# Patient Record
Sex: FEMALE | Race: BLACK OR AFRICAN AMERICAN | NOT HISPANIC OR LATINO | Employment: UNEMPLOYED | URBAN - METROPOLITAN AREA
[De-identification: names, ages, dates, MRNs, and addresses within clinical notes are randomized per-mention and may not be internally consistent; named-entity substitution may affect disease eponyms.]

---

## 2018-02-19 ENCOUNTER — OFFICE VISIT CONVERTED (OUTPATIENT)
Dept: CARDIOLOGY | Facility: CLINIC | Age: 58
End: 2018-02-19
Attending: INTERNAL MEDICINE

## 2018-02-19 ENCOUNTER — CONVERSION ENCOUNTER (OUTPATIENT)
Dept: CARDIOLOGY | Facility: CLINIC | Age: 58
End: 2018-02-19

## 2018-05-21 ENCOUNTER — CONVERSION ENCOUNTER (OUTPATIENT)
Dept: GENERAL RADIOLOGY | Facility: HOSPITAL | Age: 58
End: 2018-05-21

## 2019-01-17 ENCOUNTER — OFFICE VISIT CONVERTED (OUTPATIENT)
Dept: ORTHOPEDIC SURGERY | Facility: CLINIC | Age: 59
End: 2019-01-17
Attending: ORTHOPAEDIC SURGERY

## 2019-01-24 ENCOUNTER — HOSPITAL ENCOUNTER (OUTPATIENT)
Dept: GENERAL RADIOLOGY | Facility: HOSPITAL | Age: 59
Discharge: HOME | End: 2019-01-24
Attending: ORTHOPAEDIC SURGERY

## 2019-02-07 ENCOUNTER — OFFICE VISIT CONVERTED (OUTPATIENT)
Dept: ORTHOPEDIC SURGERY | Facility: CLINIC | Age: 59
End: 2019-02-07
Attending: ORTHOPAEDIC SURGERY

## 2019-02-07 ENCOUNTER — HOSPITAL ENCOUNTER (OUTPATIENT)
Dept: LAB | Facility: HOSPITAL | Age: 59
Discharge: HOME | End: 2019-02-07

## 2019-02-07 LAB
ALBUMIN SERPL-MCNC: 4 G/DL (ref 3.5–5)
ALBUMIN/GLOB SERPL: 1.3 {RATIO} (ref 1.4–2.6)
ALP SERPL-CCNC: 104 U/L (ref 53–141)
ALT SERPL-CCNC: 21 U/L (ref 10–40)
ANION GAP SERPL CALC-SCNC: 19 MMOL/L (ref 8–19)
AST SERPL-CCNC: 17 U/L (ref 15–50)
BASOPHILS # BLD AUTO: 0.02 10*3/UL (ref 0–0.2)
BASOPHILS NFR BLD AUTO: 0.46 % (ref 0–3)
BILIRUB SERPL-MCNC: 0.22 MG/DL (ref 0.2–1.3)
BUN SERPL-MCNC: 13 MG/DL (ref 5–25)
BUN/CREAT SERPL: 14 {RATIO} (ref 6–20)
CALCIUM SERPL-MCNC: 9.8 MG/DL (ref 8.7–10.4)
CHLORIDE SERPL-SCNC: 102 MMOL/L (ref 99–111)
CONV CO2: 24 MMOL/L (ref 22–32)
CONV TOTAL PROTEIN: 7 G/DL (ref 6.3–8.2)
CREAT UR-MCNC: 0.9 MG/DL (ref 0.5–0.9)
EOSINOPHIL # BLD AUTO: 0.31 10*3/UL (ref 0–0.7)
EOSINOPHIL # BLD AUTO: 5.9 % (ref 0–7)
ERYTHROCYTE [DISTWIDTH] IN BLOOD BY AUTOMATED COUNT: 12 % (ref 11.5–14.5)
EST. AVERAGE GLUCOSE BLD GHB EST-MCNC: 134 MG/DL
GFR SERPLBLD BASED ON 1.73 SQ M-ARVRAT: >60 ML/MIN/{1.73_M2}
GLOBULIN UR ELPH-MCNC: 3 G/DL (ref 2–3.5)
GLUCOSE SERPL-MCNC: 116 MG/DL (ref 65–99)
HBA1C MFR BLD: 14.1 G/DL (ref 12–16)
HBA1C MFR BLD: 6.3 % (ref 3.5–5.7)
HCT VFR BLD AUTO: 41 % (ref 37–47)
LYMPHOCYTES # BLD AUTO: 1.57 10*3/UL (ref 1–5)
MCH RBC QN AUTO: 31.7 PG (ref 27–31)
MCHC RBC AUTO-ENTMCNC: 34.4 G/DL (ref 33–37)
MCV RBC AUTO: 91.9 FL (ref 81–99)
MONOCYTES # BLD AUTO: 0.59 10*3/UL (ref 0.2–1.2)
MONOCYTES NFR BLD AUTO: 11.4 % (ref 3–10)
NEUTROPHILS # BLD AUTO: 2.69 10*3/UL (ref 2–8)
NEUTROPHILS NFR BLD AUTO: 52 % (ref 30–85)
NRBC BLD AUTO-RTO: 0 % (ref 0–0.01)
OSMOLALITY SERPL CALC.SUM OF ELEC: 291 MOSM/KG (ref 273–304)
PLATELET # BLD AUTO: 264 10*3/UL (ref 130–400)
PMV BLD AUTO: 8.1 FL (ref 7.4–10.4)
POTASSIUM SERPL-SCNC: 4.8 MMOL/L (ref 3.5–5.3)
RBC # BLD AUTO: 4.46 10*6/UL (ref 4.2–5.4)
SODIUM SERPL-SCNC: 140 MMOL/L (ref 135–147)
TSH SERPL-ACNC: 1.66 M[IU]/L (ref 0.27–4.2)
VARIANT LYMPHS NFR BLD MANUAL: 30.3 % (ref 20–45)
WBC # BLD AUTO: 5.17 10*3/UL (ref 4.8–10.8)

## 2019-02-08 LAB
ASO AB SERPL-ACNC: 50 [IU]/ML (ref 0–200)
CONV ANTI NUCLEAR ANTIBODY WITH REFLEX: NEGATIVE
CONV RHEUMATOID FACTOR IGM: <10 [IU]/ML (ref 0–14)
CRP SERPL-MCNC: 3.6 MG/L (ref 0–5)
URATE SERPL-MCNC: 5.3 MG/DL (ref 2.5–7.5)

## 2019-03-20 ENCOUNTER — OFFICE VISIT CONVERTED (OUTPATIENT)
Dept: OTOLARYNGOLOGY | Facility: CLINIC | Age: 59
End: 2019-03-20
Attending: OTOLARYNGOLOGY

## 2019-04-04 ENCOUNTER — CONVERSION ENCOUNTER (OUTPATIENT)
Dept: PULMONOLOGY | Facility: CLINIC | Age: 59
End: 2019-04-04

## 2019-09-19 ENCOUNTER — HOSPITAL ENCOUNTER (OUTPATIENT)
Dept: LAB | Facility: HOSPITAL | Age: 59
Discharge: HOME | End: 2019-09-19
Attending: PHYSICIAN ASSISTANT

## 2019-09-19 ENCOUNTER — OFFICE VISIT CONVERTED (OUTPATIENT)
Dept: PULMONOLOGY | Facility: CLINIC | Age: 59
End: 2019-09-19
Attending: PHYSICIAN ASSISTANT

## 2019-09-21 LAB — BACTERIA SPEC AEROBE CULT: NORMAL

## 2019-10-24 ENCOUNTER — HOSPITAL ENCOUNTER (OUTPATIENT)
Dept: CARDIOLOGY | Facility: HOSPITAL | Age: 59
Discharge: HOME | End: 2019-10-24
Attending: PHYSICIAN ASSISTANT

## 2019-10-28 ENCOUNTER — OFFICE VISIT CONVERTED (OUTPATIENT)
Dept: PULMONOLOGY | Facility: CLINIC | Age: 59
End: 2019-10-28
Attending: PHYSICIAN ASSISTANT

## 2019-12-04 ENCOUNTER — HOSPITAL ENCOUNTER (OUTPATIENT)
Dept: CARDIOLOGY | Facility: HOSPITAL | Age: 59
Discharge: HOME | End: 2019-12-04
Attending: PHYSICIAN ASSISTANT

## 2020-01-31 ENCOUNTER — HOSPITAL ENCOUNTER (OUTPATIENT)
Dept: GENERAL RADIOLOGY | Facility: HOSPITAL | Age: 60
Discharge: HOME | End: 2020-01-31
Attending: PHYSICIAN ASSISTANT

## 2020-02-13 ENCOUNTER — OFFICE VISIT CONVERTED (OUTPATIENT)
Dept: PULMONOLOGY | Facility: CLINIC | Age: 60
End: 2020-02-13
Attending: PHYSICIAN ASSISTANT

## 2020-03-11 ENCOUNTER — OFFICE VISIT CONVERTED (OUTPATIENT)
Dept: PULMONOLOGY | Facility: CLINIC | Age: 60
End: 2020-03-11
Attending: PHYSICIAN ASSISTANT

## 2020-03-23 ENCOUNTER — OFFICE VISIT CONVERTED (OUTPATIENT)
Dept: OTHER | Facility: HOSPITAL | Age: 60
End: 2020-03-23
Attending: PHYSICIAN ASSISTANT

## 2020-03-27 ENCOUNTER — TELEMEDICINE CONVERTED (OUTPATIENT)
Dept: OTOLARYNGOLOGY | Facility: CLINIC | Age: 60
End: 2020-03-27
Attending: OTOLARYNGOLOGY

## 2020-04-01 ENCOUNTER — OFFICE VISIT CONVERTED (OUTPATIENT)
Dept: PULMONOLOGY | Facility: CLINIC | Age: 60
End: 2020-04-01
Attending: PHYSICIAN ASSISTANT

## 2020-04-29 ENCOUNTER — TELEPHONE CONVERTED (OUTPATIENT)
Dept: OTOLARYNGOLOGY | Facility: CLINIC | Age: 60
End: 2020-04-29
Attending: OTOLARYNGOLOGY

## 2020-05-14 ENCOUNTER — OFFICE VISIT CONVERTED (OUTPATIENT)
Dept: PULMONOLOGY | Facility: CLINIC | Age: 60
End: 2020-05-14
Attending: PHYSICIAN ASSISTANT

## 2020-05-18 ENCOUNTER — CONVERSION ENCOUNTER (OUTPATIENT)
Dept: OTHER | Facility: HOSPITAL | Age: 60
End: 2020-05-18

## 2020-05-18 ENCOUNTER — TELEPHONE CONVERTED (OUTPATIENT)
Dept: OTOLARYNGOLOGY | Facility: CLINIC | Age: 60
End: 2020-05-18
Attending: OTOLARYNGOLOGY

## 2020-05-18 ENCOUNTER — HOSPITAL ENCOUNTER (OUTPATIENT)
Dept: OTHER | Facility: HOSPITAL | Age: 60
Discharge: HOME | End: 2020-05-18
Attending: OTOLARYNGOLOGY

## 2020-05-20 ENCOUNTER — TELEMEDICINE CONVERTED (OUTPATIENT)
Dept: FAMILY MEDICINE CLINIC | Facility: CLINIC | Age: 60
End: 2020-05-20
Attending: NURSE PRACTITIONER

## 2020-05-20 LAB — SARS-COV-2 RNA SPEC QL NAA+PROBE: NOT DETECTED

## 2020-05-21 ENCOUNTER — HOSPITAL ENCOUNTER (OUTPATIENT)
Dept: LAB | Facility: HOSPITAL | Age: 60
Discharge: HOME | End: 2020-05-21
Attending: NURSE PRACTITIONER

## 2020-05-21 LAB
25(OH)D3 SERPL-MCNC: 23.1 NG/ML (ref 30–100)
ALBUMIN SERPL-MCNC: 4.2 G/DL (ref 3.5–5)
ALBUMIN/GLOB SERPL: 1.4 {RATIO} (ref 1.4–2.6)
ALP SERPL-CCNC: 108 U/L (ref 53–141)
ALT SERPL-CCNC: 21 U/L (ref 10–40)
ANION GAP SERPL CALC-SCNC: 17 MMOL/L (ref 8–19)
AST SERPL-CCNC: 18 U/L (ref 15–50)
BASOPHILS # BLD AUTO: 0.03 10*3/UL (ref 0–0.2)
BASOPHILS NFR BLD AUTO: 0.5 % (ref 0–3)
BILIRUB SERPL-MCNC: 0.21 MG/DL (ref 0.2–1.3)
BUN SERPL-MCNC: 15 MG/DL (ref 5–25)
BUN/CREAT SERPL: 18 {RATIO} (ref 6–20)
CALCIUM SERPL-MCNC: 9.6 MG/DL (ref 8.7–10.4)
CHLORIDE SERPL-SCNC: 104 MMOL/L (ref 99–111)
CHOLEST SERPL-MCNC: 259 MG/DL (ref 107–200)
CHOLEST/HDLC SERPL: 4.6 {RATIO} (ref 3–6)
CONV ABS IMM GRAN: 0.02 10*3/UL (ref 0–0.2)
CONV CO2: 24 MMOL/L (ref 22–32)
CONV IMMATURE GRAN: 0.4 % (ref 0–1.8)
CONV RHEUMATOID FACTOR IGM: <10 [IU]/ML (ref 0–14)
CONV TOTAL PROTEIN: 7.3 G/DL (ref 6.3–8.2)
CREAT UR-MCNC: 0.83 MG/DL (ref 0.5–0.9)
CRP SERPL-MCNC: 3.6 MG/L (ref 0–5)
DEPRECATED RDW RBC AUTO: 45.9 FL (ref 36.4–46.3)
EOSINOPHIL # BLD AUTO: 0.22 10*3/UL (ref 0–0.7)
EOSINOPHIL # BLD AUTO: 3.9 % (ref 0–7)
ERYTHROCYTE [DISTWIDTH] IN BLOOD BY AUTOMATED COUNT: 13.1 % (ref 11.7–14.4)
ERYTHROCYTE [SEDIMENTATION RATE] IN BLOOD: 12 MM/H (ref 0–30)
EST. AVERAGE GLUCOSE BLD GHB EST-MCNC: 140 MG/DL
GFR SERPLBLD BASED ON 1.73 SQ M-ARVRAT: >60 ML/MIN/{1.73_M2}
GLOBULIN UR ELPH-MCNC: 3.1 G/DL (ref 2–3.5)
GLUCOSE SERPL-MCNC: 113 MG/DL (ref 65–99)
HBA1C MFR BLD: 6.5 % (ref 3.5–5.7)
HCT VFR BLD AUTO: 46.6 % (ref 37–47)
HDLC SERPL-MCNC: 56 MG/DL (ref 40–60)
HGB BLD-MCNC: 14.5 G/DL (ref 12–16)
LDLC SERPL CALC-MCNC: 170 MG/DL (ref 70–100)
LYMPHOCYTES # BLD AUTO: 1.95 10*3/UL (ref 1–5)
LYMPHOCYTES NFR BLD AUTO: 34.6 % (ref 20–45)
MCH RBC QN AUTO: 29.7 PG (ref 27–31)
MCHC RBC AUTO-ENTMCNC: 31.1 G/DL (ref 33–37)
MCV RBC AUTO: 95.3 FL (ref 81–99)
MONOCYTES # BLD AUTO: 0.63 10*3/UL (ref 0.2–1.2)
MONOCYTES NFR BLD AUTO: 11.2 % (ref 3–10)
NEUTROPHILS # BLD AUTO: 2.78 10*3/UL (ref 2–8)
NEUTROPHILS NFR BLD AUTO: 49.4 % (ref 30–85)
NRBC CBCN: 0 % (ref 0–0.7)
OSMOLALITY SERPL CALC.SUM OF ELEC: 294 MOSM/KG (ref 273–304)
PLATELET # BLD AUTO: 286 10*3/UL (ref 130–400)
PMV BLD AUTO: 11.2 FL (ref 9.4–12.3)
POTASSIUM SERPL-SCNC: 4.2 MMOL/L (ref 3.5–5.3)
RBC # BLD AUTO: 4.89 10*6/UL (ref 4.2–5.4)
SODIUM SERPL-SCNC: 141 MMOL/L (ref 135–147)
TRIGL SERPL-MCNC: 163 MG/DL (ref 40–150)
TSH SERPL-ACNC: 1.87 M[IU]/L (ref 0.27–4.2)
URATE SERPL-MCNC: 4.5 MG/DL (ref 2.5–7.5)
VIT B12 SERPL-MCNC: 1038 PG/ML (ref 211–911)
VLDLC SERPL-MCNC: 33 MG/DL (ref 5–37)
WBC # BLD AUTO: 5.63 10*3/UL (ref 4.8–10.8)

## 2020-05-23 LAB
CCP IGA+IGG SERPL IA-ACNC: 9 UNITS (ref 0–19)
DSDNA AB SER-ACNC: NEGATIVE [IU]/ML
ENA AB SER IA-ACNC: NEGATIVE {RATIO}

## 2020-05-29 ENCOUNTER — TELEMEDICINE CONVERTED (OUTPATIENT)
Dept: FAMILY MEDICINE CLINIC | Facility: CLINIC | Age: 60
End: 2020-05-29
Attending: NURSE PRACTITIONER

## 2020-07-31 ENCOUNTER — TELEMEDICINE CONVERTED (OUTPATIENT)
Dept: FAMILY MEDICINE CLINIC | Facility: CLINIC | Age: 60
End: 2020-07-31
Attending: NURSE PRACTITIONER

## 2020-09-04 ENCOUNTER — OFFICE VISIT CONVERTED (OUTPATIENT)
Dept: SURGERY | Facility: CLINIC | Age: 60
End: 2020-09-04
Attending: NURSE PRACTITIONER

## 2020-10-15 ENCOUNTER — OFFICE VISIT CONVERTED (OUTPATIENT)
Dept: FAMILY MEDICINE CLINIC | Facility: CLINIC | Age: 60
End: 2020-10-15
Attending: NURSE PRACTITIONER

## 2020-10-26 ENCOUNTER — TELEMEDICINE CONVERTED (OUTPATIENT)
Dept: FAMILY MEDICINE CLINIC | Facility: CLINIC | Age: 60
End: 2020-10-26
Attending: NURSE PRACTITIONER

## 2020-11-16 ENCOUNTER — TELEMEDICINE CONVERTED (OUTPATIENT)
Dept: FAMILY MEDICINE CLINIC | Facility: CLINIC | Age: 60
End: 2020-11-16
Attending: NURSE PRACTITIONER

## 2021-01-02 ENCOUNTER — HOSPITAL ENCOUNTER (OUTPATIENT)
Dept: URGENT CARE | Facility: CLINIC | Age: 61
Discharge: HOME | End: 2021-01-02
Attending: NURSE PRACTITIONER

## 2021-01-28 ENCOUNTER — HOSPITAL ENCOUNTER (OUTPATIENT)
Dept: CARDIOLOGY | Facility: HOSPITAL | Age: 61
Discharge: HOME | End: 2021-01-28
Attending: NURSE PRACTITIONER

## 2021-01-28 ENCOUNTER — TELEMEDICINE CONVERTED (OUTPATIENT)
Dept: FAMILY MEDICINE CLINIC | Facility: CLINIC | Age: 61
End: 2021-01-28
Attending: NURSE PRACTITIONER

## 2021-02-05 ENCOUNTER — HOSPITAL ENCOUNTER (OUTPATIENT)
Dept: GENERAL RADIOLOGY | Facility: HOSPITAL | Age: 61
Discharge: HOME | End: 2021-02-05
Attending: INTERNAL MEDICINE

## 2021-03-24 ENCOUNTER — HOSPITAL ENCOUNTER (OUTPATIENT)
Dept: VACCINE CLINIC | Facility: HOSPITAL | Age: 61
Discharge: HOME | End: 2021-03-24
Attending: INTERNAL MEDICINE

## 2021-04-14 ENCOUNTER — OFFICE VISIT CONVERTED (OUTPATIENT)
Dept: FAMILY MEDICINE CLINIC | Facility: CLINIC | Age: 61
End: 2021-04-14
Attending: PHYSICIAN ASSISTANT

## 2021-04-14 ENCOUNTER — CONVERSION ENCOUNTER (OUTPATIENT)
Dept: FAMILY MEDICINE CLINIC | Facility: CLINIC | Age: 61
End: 2021-04-14

## 2021-04-14 ENCOUNTER — HOSPITAL ENCOUNTER (OUTPATIENT)
Dept: LAB | Facility: HOSPITAL | Age: 61
Discharge: HOME | End: 2021-04-14
Attending: PHYSICIAN ASSISTANT

## 2021-04-14 LAB — ERYTHROCYTE [SEDIMENTATION RATE] IN BLOOD: 28 MM/H (ref 0–30)

## 2021-04-15 ENCOUNTER — HOSPITAL ENCOUNTER (OUTPATIENT)
Dept: FAMILY MEDICINE CLINIC | Facility: CLINIC | Age: 61
Discharge: HOME | End: 2021-04-15
Attending: PHYSICIAN ASSISTANT

## 2021-04-15 LAB — SARS-COV-2 RNA SPEC QL NAA+PROBE: NOT DETECTED

## 2021-04-16 LAB
ASO AB SERPL-ACNC: 57 [IU]/ML (ref 0–200)
CONV RHEUMATOID FACTOR IGM: <10 [IU]/ML (ref 0–14)
CRP SERPL-MCNC: 7.8 MG/L (ref 0–5)
DSDNA AB SER-ACNC: NEGATIVE [IU]/ML
ENA AB SER IA-ACNC: ABNORMAL {RATIO}
URATE SERPL-MCNC: 5.1 MG/DL (ref 2.5–7.5)

## 2021-04-17 LAB
CENTROMERE AB TITR SER IF: 0.5 [IU]/ML (ref 0–7)
ENA SCL70 AB SER QL IA: <0.6 [IU]/ML (ref 0–7)
JO-1 (WB)*: <0.3 [IU]/ML (ref 0–7)
RNP: 3.3 [IU]/ML (ref 0–5)
RNP: <0.3 [IU]/ML (ref 0–7)
SMI: 1.5 [IU]/ML (ref 0–7)
SSA (RO) (ENA) AB, IGG: 0.5 [IU]/ML (ref 0–7)
SSB (LA) ENA AB, IGG: <0.3 [IU]/ML (ref 0–7)

## 2021-05-05 ENCOUNTER — OFFICE VISIT CONVERTED (OUTPATIENT)
Dept: FAMILY MEDICINE CLINIC | Facility: CLINIC | Age: 61
End: 2021-05-05
Attending: NURSE PRACTITIONER

## 2021-05-05 ENCOUNTER — HOSPITAL ENCOUNTER (OUTPATIENT)
Dept: GENERAL RADIOLOGY | Facility: HOSPITAL | Age: 61
Discharge: HOME | End: 2021-05-05
Attending: NURSE PRACTITIONER

## 2021-05-10 NOTE — H&P
History and Physical      Patient Name: Autumn Muñoz   Patient ID: [id number]   Sex: Female   YOB: 1960    Primary Care Provider: Jane MORGAN   Referring Provider: Jane MORGAN    Visit Date: September 4, 2020    Provider: EDDIE Dumont   Location: [facility]   Location Address: [address] [place] [address]  [id number]   Location Phone: [phone]          Chief Complaint  · Age 50 or over  · FH of colon cancer  · Constipation  · Abdominal Pain  · Difficulty Swallowing  · Requesting EGD and Colonoscopy      History Of Present Illness  The patient is a 60 year old /Black female presenting to the Surgical Specialist office on a referral from Jane MORGAN.   Autumn Muñoz needs to have a diagnostic colonoscopy and EGD.   Patient states that they have had a colonoscopy. 10 years ago   Patient currently complains of: constipation, difficulty swallowing, abdominal pain, and narrow stools   Patient Does have family history of colon cancer. grandmother      Patient presents today on referral from Mirtha Cordero for dysphasia, constipation, abdominal pain and change in bowel habit.  Patient reports that her stools have been more narrow for the last 3 months.  Patient reports that she has had trouble swallowing for the last 6 months and feeling as food gets stuck in her chest.  Denies any rectal bleeding.  Admits to family history of colon cancer with her maternal grandmother.    Reports last colonoscopy was over 10 years ago and was normal.    Patient does report that she had some problems with anesthesia in the past after having uterine fibroids removed.  I am assuming that this was probably general anesthesia.       Past Medical History  Disease Name Date Onset Notes   Allergic rhinitis --  --    Allergic rhinitis, chronic --  --    Arthritis --  --    Asthma --  --    Chronic Obstructive Pulmonary Disease --  --    GERD --  --    Headache  --  --    High cholesterol --  --    Hyperlipemia --  --    Limb Pain --  --    Limb Swelling --  --    Myofascial muscle pain --  --    Recurrent sinus infections --  --    Jaden's edema of vocal folds --  --    Shortness Of Air --  --    Thyroid nodule --  --    Tinnitus, bilateral --  --    Tobacco abuse --  --    Voice hoarseness --  --          Past Surgical History  Procedure Name Date Notes   *Denies any surgical procedures --  --    Ceasarian section --  --    Uterine fibroid embolization --  --          Medication List  Name Date Started Instructions   aspirin 325 mg oral tablet  take 1 tablet (325 mg) by oral route once daily   azelastine 137 mcg (0.1 %) nasal aerosol,spray 03/20/2019 spray 2 sprays in each nostril by intranasal route 2 times per day for 30 days   cetirizine 10 mg oral tablet 05/29/2020 take 1 tablet (10 mg) by oral route once daily for 30 days   cyclobenzaprine 10 mg oral tablet 03/20/2019 take 1 tablet by oral route once a day (at bedtime) for 30 days   Cymbalta 30 mg oral capsule,delayed release(DR/EC) 05/29/2020 take 1 capsule (30 mg) by oral route once daily   diclofenac sodium 50 mg oral tablet,delayed release (DR/EC) 03/27/2020 take 1 tablet (50 mg) by oral route 2 times per day for 30 days   Flonase Allergy Relief 50 mcg/actuation nasal spray,suspension 05/29/2020 spray 1 spray (50 mcg) in each nostril by intranasal route once daily   Lipitor 20 mg oral tablet 05/21/2020 take 1 tablet (20 mg) by oral route once daily at bedtime   montelukast 10 mg oral tablet 05/29/2020 take 1 tablet (10 mg) by oral route once daily in the evening   Nicorette 4 mg buccal gum 05/20/2020 chew 1 piece of gum (4 mg) by oral route every 2 hours as needed and as directed         Allergy List  Allergen Name Date Reaction Notes   naproxen --  --  --    NSAIDS --  --  --        Allergies Reconciled  Family Medical History  Disease Name Relative/Age Notes   Diabetes, unspecified type  Paternal grandparent  "  Family history of colon cancer Grandmother (maternal)/30s   Grandmother (maternal)/30s   Family history of breast cancer Sister/20s   Sister/20s; Aunt; Uncle/70s   Family history of certain chronic disabling diseases; arthritis Father/   Father         Social History  Finding Status Start/Stop Quantity Notes   Alcohol Use --  --/-- --  07/31/2020 - 05/29/2020 - 05/20/2020 - rarely drinks, less than 1 drink per day   Claustophobic Unknown --/-- --  yes   Recreational Drug Use Never --/-- --  no   Single. --  --/-- --  --    Tobacco Current every day --/-- 1/2 pk/day Currently taking Chantix trying to quit   Unemployed. --  --/-- --  --          Review of Systems  · Constitutional  o Denies  o : fever, chills  · Eyes  o Denies  o : yellowish discoloration of eyes  · HENT  o Denies  o : difficulty swallowing  · Cardiovascular  o Denies  o : chest pain, chest pain on exertion  · Respiratory  o Denies  o : shortness of breath  · Gastrointestinal  o Admits  o : constipation, dysphagia, abdominal pain, narrow stools  o Denies  o : nausea, vomiting, diarrhea  · Genitourinary  o Denies  o : abnormal color of urine  · Integument  o Denies  o : rash  · Neurologic  o Denies  o : tingling or numbness  · Musculoskeletal  o Denies  o : joint pain  · Endocrine  o Denies  o : weight gain, weight loss      Vitals  Date Time BP Position Site L\R Cuff Size HR RR TEMP (F) WT  HT  BMI kg/m2 BSA m2 O2 Sat        09/04/2020 11:32 AM         212lbs 0oz 5'  2\" 38.77 2.05           Physical Examination  · Constitutional  o Appearance  o : well developed, well-nourished, patient in no apparent distress  · Head and Face  o Head  o :   § Inspection  § : atraumatic, normocephalic  o Face  o :   § Inspection  § : no facial lesions  · Eyes  o Conjunctivae  o : conjunctivae normal  o Sclerae  o : sclerae white  · Neck  o Inspection/Palpation  o : normal appearance, no masses or tenderness, trachea midline  · Respiratory  o Respiratory " Effort  o : breathing unlabored  · Skin and Subcutaneous Tissue  o General Inspection  o : no lesions present, no areas of discoloration, skin turgor normal, texture normal  · Neurologic  o Mental Status Examination  o :   § Orientation  § : grossly oriented to person, place and time  § Attention  § : attention normal, concentration abilities normal  § Fund of Knowledge  § : fund of knowledge within normal limits, patient aware of current events  o Gait and Station  o : normal gait, able to stand without difficulty  · Psychiatric  o Judgement and Insight  o : judgment and insight intact  o Mood and Affect  o : mood normal, affect appropriate              Assessment  · Difficulty in swallowing     787.20/R13.10  · Family History of Colon Cancer     V16.0/Z80.0  · Abdominal pain, lower     789.09/R10.30  · Change in bowel habit     787.99/R19.4  · Pre-op testing     V72.84/Z01.818      Plan  · Orders  o Consent for Colonoscopy with Possible Biopsy - Possible risks/complications, benefits, and alternatives to surgical or invasive procedure have been explained to patient and/or legal guardian. -Patient has been evaluated and can tolerate anesthesia and/or sedation. Risks, benefits, and alternatives to anesthesia and sedation have been explained to patient and/or legal guardian. (40027) - 789.09/R10.30, V16.0/Z80.0, 787.99/R19.4 - 11/23/2020  o Consent for Esophagogastroduodenoscopy (EGD) with dilation - Possible risks/complications, benefits, and alternatives to surgical or invasive procedure have been explained to patient and/or legal guardian. - Patient has been evaluated and can tolerate anesthesia and/or sedation. Risks, benefits, and alternatives to anesthesia and sedation have been explained to patient and/or legal guardian. (41377) - 787.20/R13.10 - 11/23/2020  o [facility] [place]id-[place] Screening (28737) - V72.84/Z01.818 - 11/18/2020   [address] @ 8:00am  · Medications  o Medications have been  Reconciled  o Transition of Care or Provider Policy  · Instructions  o Surgical Facility: [facility]  o Handouts Provided Pre-Procedure Instructions including date, time, and location of procedure.   o PLAN: Proceeed with colonoscopy. Patient understands risks/benefits and is willing to proceed.   o PLAN: Proceeed with EGD. Patient understands risks/benefits and is willing to proceed.   o ***Surgical Orders***  o RISK AND BENEFITS:  o Given these options, the patient has verbally expressed an understanding of the risks of the surgery and finds these risks acceptable. Will proceed with surgery as soon as possible.  o O.R. PREP: Per protocol   o IV: Per Anesthesia  o Please sign permit for: Colonoscopy with possible biopsies by Dr. Singh.  o Please sign permit for: Esophagogastroduodenoscopy with possible biopsies and dilation by Dr. Singh.  o The above History and Physical Examination has been completed within 30 days of admission.  o ***Patient Status***  o Outpatient  o Follow up in the in the office post procedure.  o Advised patient she would need COVID-19 testing prior to procedure. Encourage patient self isolate in between testing and procedure. Patient verbalizes understanding is willing to proceed  o Electronically Identified Patient Education Materials Provided Electronically  · Disposition  o Call or Return if symptoms worsen or persist.            Electronically Signed by: EDDIE Dumont -Author on September 4, 2020 12:41:50 PM

## 2021-05-12 NOTE — PROGRESS NOTES
Quick Note      Patient Name: Autumn Muñoz   Patient ID: [id number]   Sex: Female   YOB: 1960    Primary Care Provider: Gregory Kohler MD   Referring Provider: Gillian NEGRO    Visit Date: April 29, 2020    Provider: Mark Juares MD   Location: [facility]   Location Address: [address] [place] [address], [address]  [id number]   Location Phone: [phone]          History Of Present Illness  TELEHEALTH TELEPHONE VISIT  Chief Complaint: f/u chronic rhinosinusitis   Autumn Muñoz is a 60 year old /Black female who is presenting for evaluation via telehealth telephone visit. Verbal consent obtained before beginning visit by ARIC Baker and JAMIE Ferreira. We last spoke on 3/27/20 at which time she was taking levofloxacin for a potential sinus infection. She was placed on azithromycin on 4/13/20. She tells me that her headaches and rhinorrhea have improved. She continues to use the azelastine, Flonase, cetirizine, and montelukast. She mentions that her right knee has been stiff and sore for around 1 week. She cannot recall any inciting injury or trauma. She denies that the knee itself is swollen and has not had any swelling or pain in the corresponding foot or calf. She does not have any history of blood clots. She has been taking some ibuprofen and putting heat on the joint without much improvement. She is also been elevating it at night and trying to stay off of it. She also mentions that her tinnitus is gotten much worse and is curious about her previously scheduled audiogram. She is asked if I would be willing to refill her allergy medications.   Provider spent 18 minutes with the patient during telehealth visit.   The following staff were present during this visit: Mark Juares MD   Past Medical History/Overview of Patient Symptoms          Assessment  · Chronic rhinosinusitis     473.9/J32.9  · Tobacco abuse     305.1/Z72.0  · Allergic  rhinitis     477.9/J30.9  · Myofascial muscle pain     729.1/M79.18  · Knee pain, right     719.46/M25.561  · Tinnitus of both ears     388.30/H93.13      Plan  · Orders  o Physician Telephone Evaluation, 11-20 minutes (87446) - 473.9/J32.9, 477.9/J30.9, 729.1/M79.18, 719.46/M25.561 - 04/29/2020  o Audiometry, comprehensive, threshold evaluation and speech recognition [facility] (28212) - 388.30/H93.13 - 04/29/2020  o Tympanogram (Impedance Testing) [facility] ([id number]) - 388.30/H93.13 - 04/29/2020  · Medications  o montelukast 10 mg oral tablet   SIG: take 1 tablet (10 mg) by oral route once daily in the evening   DISP: (30) tablets with 11 refills  Prescribed on 04/29/2020     o Flonase Allergy Relief 50 mcg/actuation nasal spray,suspension   SIG: spray 1 spray (50 mcg) in each nostril by intranasal route once daily   DISP: (1) 9.9 ml aer w/adap with 11 refills  Courtesy Refilled on 04/29/2020     · Instructions  o Plan Of Care: Impressions and findings were discussed Mrs. Muñoz at great length. Fortunately, her green to white rhinorrhea and headaches have resolved after the most recent course of azithromycin. Unfortunately, now her right knee is painful and stiff but without any swelling. We discussed treating this like more of an injury with ice, heat, elevation, and NSAIDs. She will call if she is not doing any better over the next few days or if there is any swelling that develops so I may order an ultrasound to rule out DVT although we discussed this is less likely and she is quite concerned about this possibility. We will also reschedule her for an audiogram and I will allergy medications.            Electronically Signed by: Mark Juares MD -Author on April 29, 2020 09:42:58 AM

## 2021-05-13 NOTE — PROGRESS NOTES
Progress Note      Patient Name: Autumn Muñoz   Patient ID: [id number]   Sex: Female   YOB: 1960    Primary Care Provider: Jane MORGAN   Referring Provider: SUJATHA MORGAN    Visit Date: October 26, 2020    Provider: EDDIE Livingston   Location: [facility]   Location Address: [address] [place] [address], [address]  [place], [place]  [id number]   Location Phone: [phone]          Chief Complaint  · congestion  · sore throat  · dizziness  · L ear pain      History Of Present Illness  Video Conferencing Visit  Autumn Muñoz is a 60 year old /Black female who is presenting for evaluation via video conferencing via [employer]. Verbal consent obtained before beginning visit.   The following staff were present during this visit: EDDIE LIVINGSTON   Autumn Muñoz is a 60 year old /Black female who presents for evaluation and treatment of:      Patient is here today w cc of congestion, sore throat, L ear pain and dizziness. She gets chronic sinusitis. States she took a zpack that was prescribed in ER but it did not help. States it does only helped for a week then the symptoms worsened. States she wakes up w/bursting h/a from the sinus pressure. States her left ear is painful and seems to be making her throat hurt. She is doing the netti pot and applying warm compresses. She has an appt w/ENT next month. She is taking Flonase and Zyrtec. She had a neg COVID test in the ER.       Past Medical History  Disease Name Date Onset Notes   Allergic rhinitis --  --    Allergic rhinitis, chronic --  --    Arthritis --  --    Asthma --  --    Chronic Obstructive Pulmonary Disease --  --    GERD --  --    Headache --  --    High cholesterol --  --    Hyperlipemia --  --    Limb Pain --  --    Limb Swelling --  --    Myofascial muscle pain --  --    Recurrent sinus infections --  --    Jaden's edema of vocal folds --  --    Shortness Of Air --  --    Thyroid  nodule --  --    Tinnitus, bilateral --  --    Tobacco abuse --  --    Voice hoarseness --  --          Past Surgical History  Procedure Name Date Notes   *Denies any surgical procedures --  --    Ceasarian section --  --    Uterine fibroid embolization --  --          Medication List  Name Date Started Instructions   aspirin 325 mg oral tablet  take 1 tablet (325 mg) by oral route once daily   atorvastatin 20 mg oral tablet 09/18/2020 TAKE 1 TABLET BY MOUTH ONCE DAILY AT BEDTIME   azelastine 137 mcg (0.1 %) nasal aerosol,spray 03/20/2019 spray 2 sprays in each nostril by intranasal route 2 times per day for 30 days   cetirizine 10 mg oral tablet 05/29/2020 take 1 tablet (10 mg) by oral route once daily for 30 days   cyclobenzaprine 10 mg oral tablet 03/20/2019 take 1 tablet by oral route once a day (at bedtime) for 30 days   Cymbalta 30 mg oral capsule,delayed release(DR/EC) 10/21/2020 take 1 capsule (30 mg) by oral route once daily for 30 days   diclofenac sodium 50 mg oral tablet,delayed release (DR/EC) 03/27/2020 take 1 tablet (50 mg) by oral route 2 times per day for 30 days   Flonase Allergy Relief 50 mcg/actuation nasal spray,suspension 05/29/2020 spray 1 spray (50 mcg) in each nostril by intranasal route once daily   ibuprofen 200 mg oral tablet 10/15/2020 take 1 tablet (200 mg) by oral route every 6 hours as needed with food   Lipitor 20 mg oral tablet 05/21/2020 take 1 tablet (20 mg) by oral route once daily at bedtime   montelukast 10 mg oral tablet 05/29/2020 take 1 tablet (10 mg) by oral route once daily in the evening   Nicorette 4 mg buccal gum 05/20/2020 chew 1 piece of gum (4 mg) by oral route every 2 hours as needed and as directed   Vitamin D (Ergocalciferol) 02541 UNIT Oral Capsule 10/21/2020 Take 1 capsule by mouth once a week   Vitamin D2 1,250 mcg (50,000 unit) oral capsule 09/18/2020 Take 1 capsule by mouth once a week         Allergy List  Allergen Name Date Reaction Notes   naproxen --  --   --    NSAIDS --  --  --        Allergies Reconciled  Family Medical History  Disease Name Relative/Age Notes   Diabetes, unspecified type  Paternal grandparent   Family history of colon cancer Grandmother (maternal)/30s   Grandmother (maternal)/30s   Family history of breast cancer Sister/20s   Sister/20s; Aunt; Uncle/70s   Family history of certain chronic disabling diseases; arthritis Father/   Father         Social History  Finding Status Start/Stop Quantity Notes   Alcohol Use --  --/-- --  10/26/2020 - 10/15/2020 - 10/15/2020 - 07/31/2020 - 05/29/2020 - 05/20/2020 - rarely drinks, less than 1 drink per day   Claustophobic Unknown --/-- --  yes   Recreational Drug Use Never --/-- --  no   Single. --  --/-- --  --    Tobacco Current every day --/-- 1/2 pk/day Currently taking Chantix trying to quit   Unemployed. --  --/-- --  --          Immunizations  NameDate Admin Mfg Trade Name Lot Number Route Inj VIS Given VIS Publication   [id number]/01/2019 SKB Fluzone Quadrivalent  NE NE 05/20/2020    Comments:          Review of Systems  · Constitutional  o Denies  o : fever, fatigue, weight loss, weight gain  · HENT  o Admits  o : headaches, vertigo, sore throat, ear fullness  · Cardiovascular  o Denies  o : lower extremity edema, claudication, chest pressure, palpitations  · Respiratory  o Admits  o : chest congestion  o Denies  o : shortness of breath, wheezing, cough, hemoptysis, dyspnea on exertion  · Gastrointestinal  o Denies  o : nausea, vomiting, diarrhea, constipation, abdominal pain      Physical Examination  · Constitutional  o Appearance  o : alert, in no acute distress, well developed, well-nourished  · Skin and Subcutaneous Tissue  o General Inspection  o : no rashes, normal skin color, warm and dry  o Digits and Nails  o : no clubbing, cyanosis, deformities or edema present, normal appearing nails  · Neurologic  o Mental Status Examination  o : alert and oriented to time, place, and person. Gait and  Station: normal gait, able to stand without difficulty  · Psychiatric  o Judgement and Insight  o : judgment and insight intact  o Mood and Affect  o : normal mood and affect          Assessment  · Recurrent sinus infections     473.9/J32.9  · Nasal congestion     478.19/R09.81  · Sore throat     462/J02.9  · Ear pain, left     388.70/H92.02  · Dizziness     780.4/R42  · Sinusitis     473.9/J32.9      Plan  · Orders  o ACO-39: Current medications updated and reviewed (1159F, ) - - 10/26/2020  o ACO-14: Influenza immunization administered or previously received [facility] () - - 10/26/2020  · Medications  o Levaquin 750 mg oral tablet   SIG: take 1 tablet (750 mg) by oral route once daily for 7 days   DISP: (7) Tablet with 0 refills  Prescribed on 10/26/2020     o Medrol (Kenny) 4 mg oral tablets,dose pack   SIG: take by oral route as directed per package instructions   DISP: (1) Dose Pack with 0 refills  Prescribed on 10/26/2020     o Medications have been Reconciled  o Transition of Care or Provider Policy  · Instructions  o Take all medications as prescribed/directed.  o Patient was educated/instructed on their diagnosis, treatment and medications prior to discharge from the clinic today.  o Patient instructed to seek medical attention urgently for new or worsening symptoms.  o Call the office with any concerns or questions.  o Electronically Identified Patient Education Materials Provided Electronically  · Disposition  o Call or Return if symptoms worsen or persist.            Electronically Signed by: EDDIE Livingston -Author on October 30, 2020 09:06:12 PM

## 2021-05-13 NOTE — PROGRESS NOTES
Progress Note      Patient Name: Autumn Muñoz   Patient ID: [id number]   Sex: Female   YOB: 1960    Primary Care Provider: Jane MORGAN   Referring Provider: Gillian NEGRO    Visit Date: May 20, 2020    Provider: EDDIE Livingston   Location: [facility]   Location Address: [address] [place] [address], [address]  [place], [place]  [id number]   Location Phone: [phone]          Chief Complaint  · NEW PATIENT ESTABLISH CARE      History Of Present Illness  Video Conferencing Visit  Autumn Muñoz is a 60 year old /Black female who is presenting for evaluation via video conferencing via MESSENGER VIDEO CALL. Verbal consent obtained before beginning visit.   The following staff were present during this visit: Jane MORGAN   Autumn Muñoz is a 60 year old /Black female who presents for evaluation and treatment of:      Patient is here today on video call to establish care.    Says she has starting stage of COPD, athma, allergies and TMJ. She need colonoscopy post covid-19. Pap smear over 10 years ago. Needs mammogram and Dexa will schedule.    COPD-new dx found on CT scan-she was told a wk ago. She was dx by Diana Schwartz at  office. She has her on a long acting inhaler (stilato)-she has not started taking it yet. She is a smoker but trying to stop-she is chewing Nicorette gum. She was recently switched to another gum but it is not working as well. States staying at home she has increased her smoking and had gotten down to 5 cig per day-she is on less than a ppd. She started Chantix again.     Asthma-she reports her asthma is under good control. She does not have a problem unless she gets a URI.    sinus inf-states she has been on 3 AB for sinus infection but it has not resolved. She has seen  ENT but could not get in so she saw Diana Schwartz and was prescribed Augmentin. States they cause migraines and blood streaked mucus. States  today is the first time in the last few months she has not been excreting mucus. She saw  and he placed her on Levaquin-which helped the inflammation. He placed her on another round on Levaquin-which helped the mucus. She is still having the sinus pain and h/a. States she has sinus infection every other month. States this month she felt it was going into her chest so he sent her for COVID testing. States she feels the best she has felt in awhile. They discussed ordering a CT of the Sinuses but he wanted to do the COVID testing. He is to call today.    TMJ-reports she has had it for years. Right now her whole left side is tender and swollen. Reports it feels like the trigeminal is inflamed. She has been taking motrin 800mg. Admits sharp pain the jaw.    tenderness in chest, shoulder and side. States she is tender to touch. States this has been going on since the 1980's. States she has been dx with OA and RA. She is taking Tylenol arthritis. C/o knee, finger, wrist and lower back pain. She wears a back brace daily.     fatigue-reports she does not have energy to do anything.    hx hypoglycemia and impaired fasting glucose. Reports family hx of DM.    45 min spent via [facility] for appt.                   Past Medical History  Disease Name Date Onset Notes   Allergic rhinitis --  --    Headache --  --    Hyperlipemia --  --    Myofascial muscle pain --  --    Recurrent sinus infections --  --    Jaden's edema of vocal folds --  --    Thyroid nodule --  --    Tinnitus, bilateral --  --    Tobacco abuse --  --    Voice hoarseness --  --          Past Surgical History  Procedure Name Date Notes   *Denies any surgical procedures --  --          Medication List  Name Date Started Instructions   aspirin 325 mg oral tablet  take 1 tablet (325 mg) by oral route once daily   azelastine 137 mcg (0.1 %) nasal aerosol,spray 03/20/2019 spray 2 sprays in each nostril by intranasal route 2 times per day for 30 days    cetirizine 10 mg oral tablet 03/27/2020 take 1 tablet (10 mg) by oral route once daily for 30 days   Chantix 0.5 mg oral tablet  take 1 tablet by oral route BID   cyclobenzaprine 10 mg oral tablet 03/20/2019 take 1 tablet by oral route once a day (at bedtime) for 30 days   diclofenac sodium 50 mg oral tablet,delayed release (DR/EC) 03/27/2020 take 1 tablet (50 mg) by oral route 2 times per day for 30 days   famotidine 20 mg oral tablet 03/27/2020 take 1 tablet (20 mg) by oral route once daily at bedtime for 30 days   Flonase Allergy Relief 50 mcg/actuation nasal spray,suspension 04/29/2020 spray 1 spray (50 mcg) in each nostril by intranasal route once daily   Lipitor 20 mg oral tablet 05/21/2020 take 1 tablet (20 mg) by oral route once daily at bedtime   metformin 500 mg oral tablet 05/21/2020 take 1 tablet by oral route daily   montelukast 10 mg oral tablet 04/29/2020 take 1 tablet (10 mg) by oral route once daily in the evening   prednisone 20 mg oral tablet 05/21/2020 take 2 tablets (40 mg) by oral route once daily for 5 days   Vitamin D2 1,250 mcg (50,000 unit) oral capsule 05/21/2020 take 1 capsule by oral route once a week for 12 weeks         Allergy List  Allergen Name Date Reaction Notes   naproxen --  --  --    NSAIDS --  --  --          Family Medical History  Disease Name Relative/Age Notes   Diabetes, unspecified type  Paternal grandparent   Family history of certain chronic disabling diseases; arthritis Father/   Father         Social History  Finding Status Start/Stop Quantity Notes   Alcohol Use --  --/-- --  05/20/2020 - rarely drinks, less than 1 drink per day   Claustophobic Unknown --/-- --  yes   Recreational Drug Use Never --/-- --  no   Single. --  --/-- --  --    Tobacco Current every day --/-- 1/2 pk/day Currently taking Chantix trying to quit   Unemployed. --  --/-- --  --          Immunizations  NameDate Admin Mfg Trade Name Lot Number Route Inj VIS Given VIS Publication    [id number]/01/2019 [facility] Fluzone Quadrivalent  NE NE 05/20/2020    Comments:          Review of Systems  · Constitutional  o Denies  o : fatigue, night sweats  · Eyes  o Denies  o : double vision, blurred vision  · HENT  o Denies  o : vertigo, recent head injury  · Breasts  o Denies  o : abnormal changes in breast size, additional breast symptoms except as noted in the HPI  · Cardiovascular  o Denies  o : chest pain, irregular heart beats  · Respiratory  o Denies  o : shortness of breath, productive cough  · Gastrointestinal  o Denies  o : nausea, vomiting  · Genitourinary  o Denies  o : dysuria, urinary retention  · Integument  o Denies  o : hair growth change, new skin lesions  · Neurologic  o Denies  o : altered mental status, seizures  · Musculoskeletal  o Denies  o : joint swelling, limitation of motion  · Endocrine  o Denies  o : cold intolerance, heat intolerance  · Heme-Lymph  o Denies  o : petechiae, lymph node enlargement or tenderness  · Allergic-Immunologic  o Denies  o : frequent illnesses      Physical Examination  · Constitutional  o Appearance  o : alert, in no acute distress, well developed, well-nourished  · Eyes  o Vision  o : Conjuntivae: Normal, Sclerae white, Pupils: PERRL, Cornea: Clear, no lesions bilateral  · Ears, Nose, Mouth and Throat  o Ears  o : Ext. Ears: Normal shape, Non tender, EACs: Normal , TMs: Normal, Hearing: intact to conversational voice bilaterally  o Nose  o : No nasal discharge, Mucosa: normal, Septum: midline, Sinuses: Nontender  o Throat  o : Oropharynx: no inflmation or lesions, Tonsils: within normal limits  · Neck  o Inspection/Palpation  o : Supple, no masses or tenderness, no deformities, Trachea: Midline, ROM: with in normal limits, no neck stiffness, no lymphadenopathy  o Thyroid  o : no thyomegaly, no palpabale masses   · Respiratory  o Auscultation of Lungs  o : normal breath sounds throughout, no wheeze, rhonchi, or crackles  · Cardiovascular  o Heart  o :  Regular rate and rhythm, Normal S1,S2 , No cardiac murmers, No S3 or S4 gallop or rubs  · Gastrointestinal  o Abdominal Examination  o : abdomen soft, nontender, non distended, no rigidity, gaurding, rebound tenderness, no ventral hernias present  o Liver and spleen  o : no hepatomegaly present, liver nontender to palpation, spleen not palpable  · Skin and Subcutaneous Tissue  o General Inspection  o : no rashes, normal skin color, warm and dry  o Digits and Nails  o : no clubbing, cyanosis, deformities or edema present, normal appearing nails  · Neurologic  o Mental Status Examination  o : alert and oriented to time, place, and person. Gait and Station: normal gait, able to stand without difficulty  · Psychiatric  o Judgement and Insight  o : judgment and insight intact  o Mood and Affect  o : normal mood and affect          Assessment  · Allergic rhinitis due to allergen     477.9/J30.9  · Asthma     493.90/J45.909  asthma is under good control. Reports it flares w/URI.  · COPD (chronic obstructive pulmonary disease)     496/J44.9  admits she was diagnosed w/COPD 1 wk ago-states it was found on CT. She is being followed by  (pulmonary). States she was placed on stilato but has not started it yet. She is a smoker-smokes <1ppd. States she was down to 5 cigarettes per day but her last script of Nicorette gum was switched to another brand and it is not as effective. She desires to quit smoking-sent in script for Nicorette gum-increased dosage from 2mg to 4mg. Instructed to discontinue Chantix.  · Fatigue     780.79/R53.83  c/o excessive fatigue-ordered CBC, TSH, Vit B12 and Vit D level.  · Impaired fasting glucose     790.21/R73.01  hx of hypoglycemia and impaired fasting glucose. Family hx of DM. Ordered an A1C today.  · Screening for lipid disorders     V77.91/Z13.220  · Screening for colon cancer     V76.51/Z12.11  scheduled colonoscopy consult today.  · Post menopausal  syndrome     V49.81/Z78.0  scheduled dexa today.  · Visit for screening mammogram     V76.12/Z12.31  scheduled today.  · TMJ (dislocation of temporomandibular joint)     830.0/S03.00XA  chronic TMJ-reports left jaw inflamed and tender due to sinuses. Instructed to take NSAIDS.  · Screening for thyroid disorder     V77.0/Z13.29  · Routine lab draw     V72.60/Z01.89  · Joint pain     719.40/M25.50  · Joint swelling     719.00/M25.40  · Family history of diabetes mellitus     V18.0/Z83.3  · Chronic sinusitis     473.9/J32.9  c/o chronic sinusitis. She is seeing ENT and Pulmonary for the chronic sinusitis. She recently completed 2 rounds of Levaquin. Reports moderate improvement in her symptoms but continues to c/o sinus pain and h/a. ENT ordered COVID testing-negative result given to patient today. ENT discussed imaging if COVID testing negative- is following up w/her today.  · Arthritis     716.90/M19.90  reports she has been dx w/OA and RA in the past but unsure if blood work was done to confirm. Ordered a CCPab, rheumatoid factor, uric acid, IVON, crp and sed rate. She reports she is tender to touch all over-discussed possibility of fibromyalgia as well-ordered Cymbalta 30mg po qd.       Plan  · Orders  o COLONOSCOPY REFERRAL (COLON) - V76.51/Z12.11 - 05/20/2020   post covid  o DEXA Bone Density, 1 or more sites, axial skeleton [facility] (42825) - V49.81/Z78.0 - 05/20/2020  o Screening Mammography; Bilateral 3D (28713, , 73245) - V76.12/Z12.31 - 05/20/2020  o Hgb A1c [facility] (28382) - V18.0/Z83.3 - 05/20/2020  o Physical, Primary Care Panel (CBC, CMP, Lipid, TSH) [facility] (49480, 96165, 72839, 03047) - V77.91/Z13.220, V77.0/Z13.29, V72.60/Z01.89 - 05/20/2020  o Vitamin D (25-Hydroxy) Level (23469) - 780.79/R53.83 - 05/20/2020  o ACO-39: Current medications updated and reviewed () - - 05/20/2020  o ACO-14: Influenza immunization administered or previously received () - - 05/20/2020  o CCP Ab (43283) -  719.40/M25.50, 719.00/M25.40 - 05/20/2020  o Uric Acid Serum [facility] (84419) - 719.40/M25.50, 719.00/M25.40 - 05/20/2020  o Rheumatoid Factor IgM Quantitative [facility] (30773) - 719.40/M25.50, 719.00/M25.40 - 05/20/2020  o Sed rate (76317) - 719.40/M25.50, 719.00/M25.40 - 05/20/2020  o CRP (Inflammation) [facility] (74815) - 719.40/M25.50, 719.00/M25.40 - 05/20/2020  o IVON (antinuclear antibody profile) by enzyme immunoassay (95577) - 719.40/M25.50, 719.00/M25.40 - 05/20/2020  o Vitamin B12 level (33410) - 780.79/R53.83 - 05/20/2020  o CT Maxillofacial Area without IV Contrast [facility] (15134) - 473.9/J32.9 - 05/21/2020  · Medications  o Nicorette 4 mg buccal gum   SIG: chew 1 piece of gum (4 mg) by oral route every 2 hours as needed and as directed   DISP: (1) Box with 2 refills  Prescribed on 05/20/2020     o Cymbalta 30 mg oral capsule,delayed release(DR/EC)   SIG: take 1 capsule (30 mg) by oral route once daily   DISP: (30) capsules with 0 refills  Prescribed on 05/20/2020     o Medications have been Reconciled  o Transition of Care or Provider Policy  · Instructions  o Stop taking calcium supplements for at least 48 hours prior to your exam. Failure to stop supplements could alter results, and the radiologists will require you to reschedule your test.  o Take all medications as prescribed/directed.  o Patient was educated/instructed on their diagnosis, treatment and medications prior to discharge from the clinic today.  o Patient instructed to seek medical attention urgently for new or worsening symptoms.  o Call the office with any concerns or questions.  o Discussed Covid-19 precautions including, but not limited to, social distancing, avoid touching your face, and hand washing.   o 1 wk follow up  · Disposition  o Call or Return if symptoms worsen or persist.            Electronically Signed by: EDDIE Livingston -Author on May 21, 2020 03:40:04 PM

## 2021-05-13 NOTE — PROGRESS NOTES
Progress Note      Patient Name: Autumn Muñoz   Patient ID: [id number]   Sex: Female   YOB: 1960    Primary Care Provider: Jane MORGAN   Referring Provider: SUJATHA MORGAN    Visit Date: November 16, 2020    Provider: EDDIE Livingston   Location: [facility]   Location Address: [address] [place] [address], [address]  [place], [place]  [id number]   Location Phone: [phone]          Chief Complaint  · 1 month f/u      History Of Present Illness  TELEHEALTH TELEPHONE VISIT  Autumn Muñoz is a 60 year old /Black female who is presenting for evaluation via telehealth telephone visit. Verbal consent obtained before beginning visit.   Provider spent 25 minutes with patient during telehealth visit.   The following staff were present during this visit: EDDIE LIVINGSTON   Past Medical History/Overview of Patient Symptoms  Autumn Muñoz is a 60 year old /Black female who presents for evaluation and treatment of:      Patient is here today on phone call for 1 month follow up. States she is still having freq sinusitis. She took the round of Levaquin. States she did not take the steroids. She is doing netti pot but states it makes it worse-she is doing Flonase, azelastine, Zyrtec, singulair. She feels the azelastine works better but is too drying. Reports after she took the Levaquin the green drainage resolved but she is having quite a bit of clear drainage-admits facial pressur, pressure in ears, tinnitus. Admits the sinus congestion has flared her trigeminal neuralgia as well. Instructed to take the steroids that were prescribed to decrease inflammation. Switched from Zyrtec to xyzal. She has an appt w/ENT next wk.    admits she had a torn rotator cuff x 3 yrs-she feels the infection went to her shoulder-states she could not move it and it felt it went all the way down her side. States she took her AB and feels it helped the shoulder pain. States she  could feel the sinusitis and shoulder pain receding. The shoulder is tender and she cannot lay on it. Admit weakness in the left arm. She is able to lift the arm but it is painful. She is unable to reach her arm around her back. She cannot lift anything with the left-states she cannot lift her dog with her left. Ordered MRI left shoulder.     request refill of meclizine for dizziness-states she was nauseated due to the sinus pressure-admits she uses prn. refilled today       Past Medical History  Disease Name Date Onset Notes   Allergic rhinitis --  --    Allergic rhinitis, chronic --  --    Arthritis --  --    Asthma --  --    Chronic Obstructive Pulmonary Disease --  --    GERD --  --    Headache --  --    High cholesterol --  --    Hyperlipemia --  --    Limb Pain --  --    Limb Swelling --  --    Myofascial muscle pain --  --    Recurrent sinus infections --  --    Jaden's edema of vocal folds --  --    Shortness Of Air --  --    Thyroid nodule --  --    Tinnitus, bilateral --  --    Tobacco abuse --  --    Voice hoarseness --  --          Past Surgical History  Procedure Name Date Notes   *Denies any surgical procedures --  --    Ceasarian section --  --    Uterine fibroid embolization --  --          Medication List  Name Date Started Instructions   aspirin 325 mg oral tablet  take 1 tablet (325 mg) by oral route once daily   atorvastatin 20 mg oral tablet 09/18/2020 TAKE 1 TABLET BY MOUTH ONCE DAILY AT BEDTIME   azelastine 137 mcg (0.1 %) nasal aerosol,spray 03/20/2019 spray 2 sprays in each nostril by intranasal route 2 times per day for 30 days   cetirizine 10 mg oral tablet 05/29/2020 take 1 tablet (10 mg) by oral route once daily for 30 days   cyclobenzaprine 10 mg oral tablet 03/20/2019 take 1 tablet by oral route once a day (at bedtime) for 30 days   Cymbalta 30 mg oral capsule,delayed release(DR/EC) 10/21/2020 take 1 capsule (30 mg) by oral route once daily for 30 days   diclofenac sodium 50 mg oral  tablet,delayed release (DR/EC) 03/27/2020 take 1 tablet (50 mg) by oral route 2 times per day for 30 days   Flonase Allergy Relief 50 mcg/actuation nasal spray,suspension 05/29/2020 spray 1 spray (50 mcg) in each nostril by intranasal route once daily   ibuprofen 200 mg oral tablet 10/15/2020 take 1 tablet (200 mg) by oral route every 6 hours as needed with food   Lipitor 20 mg oral tablet 05/21/2020 take 1 tablet (20 mg) by oral route once daily at bedtime   montelukast 10 mg oral tablet 05/29/2020 take 1 tablet (10 mg) by oral route once daily in the evening   Nicorette 4 mg buccal gum 05/20/2020 chew 1 piece of gum (4 mg) by oral route every 2 hours as needed and as directed   Vitamin D (Ergocalciferol) 51180 UNIT Oral Capsule 10/21/2020 Take 1 capsule by mouth once a week   Vitamin D2 1,250 mcg (50,000 unit) oral capsule 09/18/2020 Take 1 capsule by mouth once a week         Allergy List  Allergen Name Date Reaction Notes   naproxen --  --  --    NSAIDS --  --  --          Family Medical History  Disease Name Relative/Age Notes   Diabetes, unspecified type  Paternal grandparent   Family history of colon cancer Grandmother (maternal)/30s   Grandmother (maternal)/30s   Family history of breast cancer Sister/20s   Sister/20s; Aunt; Uncle/70s   Family history of certain chronic disabling diseases; arthritis Father/   Father         Social History  Finding Status Start/Stop Quantity Notes   Alcohol Use --  --/-- --  10/26/2020 - 10/15/2020 - 10/15/2020 - 07/31/2020 - 05/29/2020 - 05/20/2020 - rarely drinks, less than 1 drink per day   Claustophobic Unknown --/-- --  yes   Recreational Drug Use Never --/-- --  no   Single. --  --/-- --  --    Tobacco Current every day --/-- 1/2 pk/day Currently taking Chantix trying to quit   Unemployed. --  --/-- --  --          Immunizations  NameDate Admin Mfg Trade Name Lot Number Route Inj VIS Given VIS Publication   [id number]/01/2019 [facility] Fluzone Quadrivalent  NE NE 05/20/2020     Comments:          Review of Systems  · Constitutional  o Denies  o : fever, fatigue, weight loss, weight gain  · HENT  o Admits  o : headaches, vertigo, lightheadedness, sinus pain, nasal congestion, nasal discharge, postnasal drip, hearing loss, tinnitus, ear pain, ear fullness  · Cardiovascular  o Denies  o : lower extremity edema, claudication, chest pressure, palpitations  · Respiratory  o Denies  o : shortness of breath, wheezing, cough, hemoptysis, dyspnea on exertion  · Gastrointestinal  o Denies  o : nausea, vomiting, diarrhea, constipation, abdominal pain  · Musculoskeletal  o Admits  o : muscular weakness, shoulder pain          Assessment  · Shoulder pain, left     719.41/M25.512  · Sinusitis     473.9/J32.9      Plan  · Orders  o ACO-39: Current medications updated and reviewed (1159F, ) - - 11/16/2020  o ACO-14: Influenza immunization administered or previously received [facility] (52104) - - 11/16/2020  o Physician Telephone Evaluation, 21-30 minutes (86638) - 719.41/M25.512, 473.9/J32.9 - 11/16/2020  o MRI shoulder left wo contrast (22672) - 719.41/M25.512 - 11/16/2020  · Medications  o omeprazole 40 mg oral capsule,delayed release(DR/EC)   SIG: take 1 capsule (40 mg) by oral route once daily before a meal   DISP: (90) Capsule with 0 refills  Prescribed on 11/16/2020     o meclizine 25 mg oral tablet   SIG: take 1 tablet (25 mg) by oral route once daily as needed   DISP: (30) Tablet with 0 refills  Prescribed on 11/16/2020     o Xyzal 5 mg oral tablet   SIG: take 1 tablet (5 mg) by oral route once daily in the evening for 30 days   DISP: (30) Tablet with 0 refills  Prescribed on 11/16/2020     o cetirizine 10 mg oral tablet   SIG: take 1 tablet (10 mg) by oral route once daily for 30 days   DISP: (30) tablets with 11 refills  Discontinued on 11/16/2020     o Levaquin 750 mg oral tablet   SIG: take 1 tablet (750 mg) by oral route once daily for 7 days   DISP: (7) Tablet with 0 refills  Discontinued on  11/16/2020     o Medrol (Kenny) 4 mg oral tablets,dose pack   SIG: take by oral route as directed per package instructions   DISP: (1) Dose Pack with 0 refills  Discontinued on 11/16/2020     o Vitamin D2 1,250 mcg (50,000 unit) oral capsule   SIG: Take 1 capsule by mouth once a week   DISP: (12) Capsule with -1 refills  Discontinued on 11/16/2020     o Medications have been Reconciled  o Transition of Care or Provider Policy  · Instructions  o Plan Of Care:   o Take all medications as prescribed/directed.  o Call the office with any concerns or questions.  o Electronically Identified Patient Education Materials Provided Electronically  · Disposition  o Call or Return if symptoms worsen or persist.            Electronically Signed by: EDDIE Livingston -Author on November 16, 2020 03:34:51 PM

## 2021-05-13 NOTE — PROGRESS NOTES
Progress Note      Patient Name: Autumn Muñoz   Patient ID: [id number]   Sex: Female   YOB: 1960    Primary Care Provider: Jane MORGAN   Referring Provider: Gillian NEGRO    Visit Date: May 29, 2020    Provider: EDDIE Livingston   Location: [facility]   Location Address: [address] [place] [address], [address]  [place], [place]  [id number]   Location Phone: [phone]          Chief Complaint  · 1 week f/u      History Of Present Illness  Video Conferencing Visit  Autumn Muñoz is a 60 year old /Black female who is presenting for evaluation via video conferencing via [employer]. Verbal consent obtained before beginning visit.   The following staff were present during this visit: Jane MROGAN   Autumn Muñoz is a 60 year old /Black female who presents for evaluation and treatment of:      Patient is here today on video call for 1 week follow up. She continues to c/o sinus pressure/ha-she took Benadryl which has calmed it some. States the fluid in her ears causes her hearing to come and go.    fibromyalgia-reports the Cymbalta has helped her fibromyalgia symtoms.States her torso is not inflamed or tender to touch.    hyperlipidemia-elevated trig, chol and ldl.     DM-A1C was 6.5% which is dx of DM-she wishes to hold off starting metformin and try diet, exercise and wt loss.     Sinusitis-Ct ariadna 6/8/20 at 08:30am.     arthritis-she had a bone scan in the 90's which showed arthritis-labs done last visit which showed inflammation from arthritis.    20 min spent via [email or web address] Video.       Past Medical History  Disease Name Date Onset Notes   Allergic rhinitis --  --    Headache --  --    Hyperlipemia --  --    Myofascial muscle pain --  --    Recurrent sinus infections --  --    Jaden's edema of vocal folds --  --    Thyroid nodule --  --    Tinnitus, bilateral --  --    Tobacco abuse --  --    Voice hoarseness --  --          Past Surgical  History  Procedure Name Date Notes   *Denies any surgical procedures --  --          Medication List  Name Date Started Instructions   aspirin 325 mg oral tablet  take 1 tablet (325 mg) by oral route once daily   azelastine 137 mcg (0.1 %) nasal aerosol,spray 03/20/2019 spray 2 sprays in each nostril by intranasal route 2 times per day for 30 days   cetirizine 10 mg oral tablet 05/29/2020 take 1 tablet (10 mg) by oral route once daily for 30 days   Chantix 0.5 mg oral tablet  take 1 tablet by oral route BID   cyclobenzaprine 10 mg oral tablet 03/20/2019 take 1 tablet by oral route once a day (at bedtime) for 30 days   Cymbalta 30 mg oral capsule,delayed release(DR/EC) 05/29/2020 take 1 capsule (30 mg) by oral route once daily   diclofenac sodium 50 mg oral tablet,delayed release (DR/EC) 03/27/2020 take 1 tablet (50 mg) by oral route 2 times per day for 30 days   famotidine 20 mg oral tablet 03/27/2020 take 1 tablet (20 mg) by oral route once daily at bedtime for 30 days   Flonase Allergy Relief 50 mcg/actuation nasal spray,suspension 05/29/2020 spray 1 spray (50 mcg) in each nostril by intranasal route once daily   Lipitor 20 mg oral tablet 05/21/2020 take 1 tablet (20 mg) by oral route once daily at bedtime   metformin 500 mg oral tablet 05/21/2020 take 1 tablet by oral route daily   montelukast 10 mg oral tablet 05/29/2020 take 1 tablet (10 mg) by oral route once daily in the evening   Nicorette 4 mg buccal gum 05/20/2020 chew 1 piece of gum (4 mg) by oral route every 2 hours as needed and as directed   prednisone 20 mg oral tablet 05/21/2020 take 2 tablets (40 mg) by oral route once daily for 5 days   Vitamin D2 1,250 mcg (50,000 unit) oral capsule 05/21/2020 take 1 capsule by oral route once a week for 12 weeks         Allergy List  Allergen Name Date Reaction Notes   naproxen --  --  --    NSAIDS --  --  --          Family Medical History  Disease Name Relative/Age Notes   Diabetes, unspecified type  Paternal  grandparent   Family history of certain chronic disabling diseases; arthritis Father/   Father         Social History  Finding Status Start/Stop Quantity Notes   Alcohol Use --  --/-- --  05/29/2020 - 05/20/2020 - rarely drinks, less than 1 drink per day   Claustophobic Unknown --/-- --  yes   Recreational Drug Use Never --/-- --  no   Single. --  --/-- --  --    Tobacco Current every day --/-- 1/2 pk/day Currently taking Chantix trying to quit   Unemployed. --  --/-- --  --          Immunizations  NameDate Admin Mfg Trade Name Lot Number Route Inj VIS Given VIS Publication   Nurenwhaa27/01/2019 SKB Fluzone Quadrivalent  NE NE 05/20/2020    Comments:          Review of Systems  · Constitutional  o Denies  o : fever, fatigue, weight loss, weight gain  · HENT  o Admits  o : headaches, sinus pain, nasal congestion, nasal discharge, postnasal drip  · Cardiovascular  o Denies  o : lower extremity edema, claudication, chest pressure, palpitations  · Respiratory  o Denies  o : shortness of breath, wheezing, cough, hemoptysis, dyspnea on exertion  · Gastrointestinal  o Denies  o : nausea, vomiting, diarrhea, constipation, abdominal pain  · Musculoskeletal  o Admits  o : joint swelling  o Denies  o : joint pain      Physical Examination  · Constitutional  o Appearance  o : alert, in no acute distress, well developed, well-nourished  · Skin and Subcutaneous Tissue  o General Inspection  o : no rashes, normal skin color, warm and dry  · Neurologic  o Mental Status Examination  o : alert and oriented to time, place, and person. Gait and Station: normal gait, able to stand without difficulty  · Psychiatric  o Judgement and Insight  o : judgment and insight intact  o Mood and Affect  o : normal mood and affect          Assessment  · Allergic rhinitis due to allergen     477.9/J30.9  c/o sinus pressure/ha-she took Benadryl which has calmed it some. States the fluid in her ears causes her hearing to wax and wane. She has been  taking Zyrtec and Flonase but discontinued singulair-instructed to restart singulair.  · Diabetes mellitus, type 2     250.00/E11.9  A1C was 6.5%-discussed this level indicates diabetes-she defers medication at this time and is going to try lifestyle changes first.  · Hyperlipidemia     272.4/E78.5  reviewed lipid- elevated trig, chol and ldl. She is on a statin and tolerating it well.  · Recurrent sinus infections     473.9/J32.9  she has been on multiple AB for sinusitis which have been ineffective. She has seen ENT and allergy. She is scheduled to have a CT on 6/8/20. Discussed holding off prescribing any other AB as it can cause CDiff.  · Fibromyalgia     729.1/M79.7  Cymbalta helping her fibromyalgia symptoms. States her torso is no longer inflamed or tender to touch.  · Arthritis     716.90/M19.90  arthritis-she had a bone scan in the 90's which showed arthritis-labs done last visit which showed inflammation from arthritis.      Plan  · Orders  o Hgb A1c [facility] ([id number]) - 250.00/E11.9 - 08/29/2020  o Lipid Panel [facility] ([id number]) - 272.4/E78.5 - 08/29/2020  o ACO-39: Current medications updated and reviewed () - - 05/29/2020  o ACO-14: Influenza immunization administered or previously received () - - 05/29/2020  · Medications  o Cymbalta 30 mg oral capsule,delayed release(DR/EC)   SIG: take 1 capsule (30 mg) by oral route once daily   DISP: (30) capsules with 5 refills  Adjusted on 05/29/2020     o Flonase Allergy Relief 50 mcg/actuation nasal spray,suspension   SIG: spray 1 spray (50 mcg) in each nostril by intranasal route once daily   DISP: (1) 9.9 ml aer w/adap with 11 refills  Adjusted on 05/29/2020     o cetirizine 10 mg oral tablet   SIG: take 1 tablet (10 mg) by oral route once daily for 30 days   DISP: (30) tablets with 11 refills  Refilled on 05/29/2020     o montelukast 10 mg oral tablet   SIG: take 1 tablet (10 mg) by oral route once daily in the evening   DISP: (30) tablets with 11  refills  Refilled on 05/29/2020     o Medications have been Reconciled  o Transition of Care or Provider Policy  · Instructions  o Continue blood sugar monitoring daily and record. Bring your log to office visits. Call the office for readings below 70 and above 250 or any complications.  o Daily foot care. Avoid walking barefoot. Annual Dilated Eye Exam.  o Discussed with patient blood pressure monitoring, hemoglobin A1C levels need to be below 7.0, and LDL (Lipid) goals below 70.  o Advised that cheeses and other sources of dairy fats, animal fats, fast food, and the extras (candy, pastries, pies, doughnuts and cookies) all contain LDL raising nutrients. Advised to increase fruits, vegetables, whole grains, and to monitor portion sizes.   o Take all medications as prescribed/directed.  o Patient was educated/instructed on their diagnosis, treatment and medications prior to discharge from the clinic today.  o Patient instructed to seek medical attention urgently for new or worsening symptoms.  o Call the office with any concerns or questions.  o Discussed Covid-19 precautions including, but not limited to, social distancing, avoid touching your face, and hand washing.   o 3 month follow up            Electronically Signed by: Jane Engle APRN -Author on June 4, 2020 02:51:20 PM

## 2021-05-13 NOTE — PROGRESS NOTES
"   Quick Note      Patient Name: Autumn Muñoz   Patient ID: [id number]   Sex: Female   YOB: 1960    Primary Care Provider: Gregory Kohler MD   Referring Provider: Gillian NEGRO    Visit Date: May 18, 2020    Provider: Mark Juares MD   [facility]   Location Address: [address] [place] [address], [address]  [id number]   Location Phone: [phone]          History Of Present Illness  TELEHEALTH TELEPHONE VISIT  Chief Complaint: \"I have a fever of 101.5.\"   Autumn Muñoz is a 60 year old /Black female who is presenting for evaluation via telehealth telephone visit. Verbal consent obtained before beginning visit by ARIC Baker and JAMIE Ferreira. She has previously been evaluated for potential chronic rhinosinusitis amongst other complaints. We last spoke on 4/29/2020 after she was placed on a course of azithromycin on 4/13/2020. She tells me that she has been experiencing fever to 101.5 according to her thermometer as well as headache, cough, rhinorrhea, nasal congestion. She also reports body aches. She is concerned about the possibility of having coronavirus and is quite emotional.   Provider spent 8 minutes with the patient during telehealth visit.   The following staff were present during this visit: Mark Juares MD   Past Medical History/Overview of Patient Symptoms          Assessment  · URI (upper respiratory infection)     465.9/J06.9      Plan  · Orders  o Physican Telephone evaluation, 5-10 min (71587) - 465.9/J06.9 - 05/18/2020  · Instructions  o Plan Of Care: Impressions and plans were discussed Mrs. Muñoz at great length. We discussed that this may be secondary to viral upper respiratory tract infection and she is quite concerned of the possibility of COVID-19. Given her fever, headache, cough, and body aches she will be sent to the respiratory viral clinic for testing. We discussed that in the meantime if she become short of breath " she should proceed directly to the emergency room.            Electronically Signed by: Mark Juares MD -Author on May 18, 2020 11:09:47 AM

## 2021-05-13 NOTE — PROGRESS NOTES
Progress Note      Patient Name: Autumn Muñoz   Patient ID: [id number]   Sex: Female   YOB: 1960    Primary Care Provider: Jane MORGAN   Referring Provider: SUJATHA MORGAN    Visit Date: October 15, 2020    Provider: EDDIE Livingston   Location: [facility]   Location Address: [address] [place] [address], [address]  [place], [place]  [id number]   Location Phone: [phone]          Chief Complaint  · F/U MEDICATION      History Of Present Illness  Autumn Muñoz is a 60 year old /Black female who presents for evaluation and treatment of:      Patient is here today for a follow up on meds. Would like to get Flu shot.    due for flu vaccine and will be due for pneumococcal after the 28th    she is still working on decreasing her smoking she is down to 6 cig a day.    states she has lost 6 friends due to COVID.     fibromyalgia-she has been taking Cymbalta it has been controlling it well until recently when the weather turned cold and it caused increased pain.     thyroid nodule-states she has several nodules the largest is 11.2cm. She is due to have a repeat thyroid U/S. Mom hx of Graves disease. Aunt w/goiter at age 13. Two sisters w/thyroid nodules.       Past Medical History  Disease Name Date Onset Notes   Allergic rhinitis --  --    Allergic rhinitis, chronic --  --    Arthritis --  --    Asthma --  --    Chronic Obstructive Pulmonary Disease --  --    GERD --  --    Headache --  --    High cholesterol --  --    Hyperlipemia --  --    Limb Pain --  --    Limb Swelling --  --    Myofascial muscle pain --  --    Recurrent sinus infections --  --    Jaden's edema of vocal folds --  --    Shortness Of Air --  --    Thyroid nodule --  --    Tinnitus, bilateral --  --    Tobacco abuse --  --    Voice hoarseness --  --          Past Surgical History  Procedure Name Date Notes   *Denies any surgical procedures --  --    Ceasarian section --  --    Uterine fibroid  embolization --  --          Medication List  Name Date Started Instructions   aspirin 325 mg oral tablet  take 1 tablet (325 mg) by oral route once daily   atorvastatin 20 mg oral tablet 09/18/2020 TAKE 1 TABLET BY MOUTH ONCE DAILY AT BEDTIME   azelastine 137 mcg (0.1 %) nasal aerosol,spray 03/20/2019 spray 2 sprays in each nostril by intranasal route 2 times per day for 30 days   cetirizine 10 mg oral tablet 05/29/2020 take 1 tablet (10 mg) by oral route once daily for 30 days   cyclobenzaprine 10 mg oral tablet 03/20/2019 take 1 tablet by oral route once a day (at bedtime) for 30 days   Cymbalta 30 mg oral capsule,delayed release(DR/EC) 10/15/2020 take 1-2 capsules (30-60 mg) by oral route once daily   diclofenac sodium 50 mg oral tablet,delayed release (DR/EC) 03/27/2020 take 1 tablet (50 mg) by oral route 2 times per day for 30 days   Flonase Allergy Relief 50 mcg/actuation nasal spray,suspension 05/29/2020 spray 1 spray (50 mcg) in each nostril by intranasal route once daily   Lipitor 20 mg oral tablet 05/21/2020 take 1 tablet (20 mg) by oral route once daily at bedtime   montelukast 10 mg oral tablet 05/29/2020 take 1 tablet (10 mg) by oral route once daily in the evening   Nicorette 4 mg buccal gum 05/20/2020 chew 1 piece of gum (4 mg) by oral route every 2 hours as needed and as directed   Vitamin D2 1,250 mcg (50,000 unit) oral capsule 09/18/2020 Take 1 capsule by mouth once a week         Allergy List  Allergen Name Date Reaction Notes   naproxen --  --  --    NSAIDS --  --  --          Family Medical History  Disease Name Relative/Age Notes   Diabetes, unspecified type  Paternal grandparent   Family history of colon cancer Grandmother (maternal)/30s   Grandmother (maternal)/30s   Family history of breast cancer Sister/20s   Sister/20s; Aunt; Uncle/70s   Family history of certain chronic disabling diseases; arthritis Father/   Father         Social History  Finding Status Start/Stop Quantity Notes  "  Alcohol Use --  --/-- --  10/15/2020 - 10/15/2020 - 07/31/2020 - 05/29/2020 - 05/20/2020 - rarely drinks, less than 1 drink per day   Claustophobic Unknown --/-- --  yes   Recreational Drug Use Never --/-- --  no   Single. --  --/-- --  --    Tobacco Current every day --/-- 1/2 pk/day Currently taking Chantix trying to quit   Unemployed. --  --/-- --  --          Immunizations  NameDate Admin Mfg Trade Name Lot Number Route Inj VIS Given VIS Publication   Deqhbclns27/01/2019 SKB Fluzone Quadrivalent  NE NE 05/20/2020    Comments:          Review of Systems  · Constitutional  o Denies  o : fever, fatigue, weight loss, weight gain  · Cardiovascular  o Denies  o : lower extremity edema, claudication, chest pressure, palpitations  · Respiratory  o Denies  o : shortness of breath, wheezing, cough, hemoptysis, dyspnea on exertion  · Gastrointestinal  o Denies  o : nausea, vomiting, diarrhea, constipation, abdominal pain  · Psychiatric  o Admits  o : anxiety      Vitals  Date Time BP Position Site L\R Cuff Size HR RR TEMP (F) WT  HT  BMI kg/m2 BSA m2 O2 Sat FR L/min FiO2 HC       10/15/2020 11:27 /81 Sitting    77 - R   212lbs 6oz 5'  2\" 38.84 2.05 95 %            Physical Examination  · Constitutional  o Appearance  o : alert, in no acute distress, well developed, well-nourished  · Ears, Nose, Mouth and Throat  o Ears  o : Ext. Ears: Normal shape, Non tender, EACs: Normal , TMs: Normal, Hearing: intact to conversational voice bilaterally  o Nose  o : No nasal discharge, Mucosa: normal, Septum: midline, Sinuses: Nontender  o Throat  o : Oropharynx: no inflmation or lesions, Tonsils: within normal limits  · Neck  o Inspection/Palpation  o : Supple, no masses or tenderness, no deformities, Trachea: Midline, ROM: with in normal limits, no neck stiffness, no lymphadenopathy  o Thyroid  o : no thyomegaly, no palpabale masses   · Respiratory  o Auscultation of Lungs  o : normal breath sounds throughout, no wheeze, " rhonchi, or crackles  · Cardiovascular  o Heart  o : Regular rate and rhythm, Normal S1,S2 , No cardiac murmers, No S3 or S4 gallop or rubs  · Skin and Subcutaneous Tissue  o General Inspection  o : no rashes, normal skin color, warm and dry  o Digits and Nails  o : no clubbing, cyanosis, deformities or edema present, normal appearing nails  · Neurologic  o Mental Status Examination  o : alert and oriented to time, place, and person. Gait and Station: normal gait, able to stand without difficulty  · Psychiatric  o Judgement and Insight  o : judgment and insight intact  o Mood and Affect  o : normal mood and affect          Assessment  · Diabetes mellitus, type 2     250.00/E11.9  reviewed A1C from 5/21/20 which was 6.5%. Ordered repeat today.  · Hyperlipidemia     272.4/E78.5  reviewed lipid from 5/21/20 -trig 163, chol 259, hdk\l 56, ldl 120-ordered repeat today  · Vitamin D deficiency     268.9/E55.9  last vit D level on 5/21/20 was 23.1-she is taking 50,000 units once wk-ordered repeat.  · Screening for alcoholism     V79.1/Z13.89  · Need for influenza vaccination     V04.81/Z23  due for a flu shot but would like to wait until after the 28th she will be due her second pneumonia vaccine at that time and wishes to get them together.   · Thyroid nodule     241.0/E04.1  hx of thyroid nodules-ordered thyroid U/S.   · Recurrent sinus infections     473.9/J32.9  · Smoking trying to quit     305.1/Z72.0  · Fibromyalgia     729.1/M79.7  c/o increased pain w/fibromyalgia due to cold weather-increased Cymbalta to 60mg prn-instructed to continue taking Cymbalta 30 mg unless she severe pain then increase to 60mg-discussed monitoring renal function closely on Cymbalta.      Plan  · Orders  o CMP [facility] (33621) - - 11/15/2020  o Hgb A1c [facility] (00742) - - 11/15/2020  o Lipid Panel [facility] (14667) - 272.4/E78.5 - 11/15/2020  o Vitamin D (25-Hydroxy) Level (85018) - 268.9/E55.9 - 11/15/2020  o ACO-39: Current medications updated and  reviewed (1159F, ) - - 10/15/2020  o ACO-14: Influenza immunization administered or previously received [facility] () - - 10/15/2020  o Thyroid Ultrasound. (01201) - 241.0/E04.1 - 10/15/2020  · Medications  o ibuprofen 200 mg oral tablet   SIG: take 1 tablet (200 mg) by oral route every 6 hours as needed with food   DISP: (30) Tablet with 2 refills  Prescribed on 10/15/2020     o Cymbalta 30 mg oral capsule,delayed release(DR/EC)   SIG: take 1-2 capsules (30-60 mg) by oral route once daily   DISP: (60) Capsule with 0 refills  Adjusted on 10/15/2020     o Medications have been Reconciled  o Transition of Care or Provider Policy  · Instructions  o Advised that cheeses and other sources of dairy fats, animal fats, fast food, and the extras (candy, pastries, pies, doughnuts and cookies) all contain LDL raising nutrients. Advised to increase fruits, vegetables, whole grains, and to monitor portion sizes.   o Audit-C Questionnaire completed and scanned into the EMR under the designated folder within the patient's documents.  o Take all medications as prescribed/directed.  o Patient was educated/instructed on their diagnosis, treatment and medications prior to discharge from the clinic today.  o Patient instructed to seek medical attention urgently for new or worsening symptoms.  o Call the office with any concerns or questions.  o 1 month telehealth visit to discuss increased Cymbalta.  o Electronically Identified Patient Education Materials Provided Electronically  · Disposition  o Call or Return if symptoms worsen or persist.            Electronically Signed by: EDDIE Livingston -Author on October 18, 2020 10:31:20 PM

## 2021-05-13 NOTE — PROGRESS NOTES
Progress Note      Patient Name: Autumn Muñoz   Patient ID: [id number]   Sex: Female   YOB: 1960    Primary Care Provider: Jane MORGAN   Referring Provider: Jane MORGAN    Visit Date: July 31, 2020    Provider: EDDIE Livingston   Location: [facility]   Location Address: [address] [place] [address], [address]  [place], [place]  [id number]   Location Phone: [phone]          Chief Complaint  · SINUS INFECTION      History Of Present Illness  Video Conferencing Visit  Autumn Muñoz is a 60 year old /Black female who is presenting for evaluation via video conferencing via [employer]. Verbal consent obtained before beginning visit.   The following staff were present during this visit: Jane MORGAN   Autumn Muñoz is a 60 year old /Black female who presents for evaluation and treatment of:      Patient is here today on video call w c/c of sinus infection. Reports headache, ear aches, post nasal drainage, sinus pressure, a lot of mucus production and nausea for almost a week now. No fever or chills, changes of taste or smell, cough. No V/D. C/o ear pain and sore throat. Admits the pressure in her head is causing vertigo. She was tested for COVID 1m ago which was negative. Stays she has been staying at home with [facility] dog she has not had any sick contacts. Reports she goes out once per month for groceries and wears her mask. States the only difference is she has been running her air conditioner more. Feels the secretion's are being trapped in the trachea area.  Admits she gets bronchitis often and is worried it is going to her chest. She has been doing netti pot, Zyrtec and Flonase which helps some.       Past Medical History  Disease Name Date Onset Notes   Allergic rhinitis --  --    Headache --  --    Hyperlipemia --  --    Myofascial muscle pain --  --    Recurrent sinus infections --  --    Jaden's edema of vocal folds --  --    Thyroid nodule --  --     Tinnitus, bilateral --  --    Tobacco abuse --  --    Voice hoarseness --  --          Past Surgical History  Procedure Name Date Notes   *Denies any surgical procedures --  --          Medication List  Name Date Started Instructions   aspirin 325 mg oral tablet  take 1 tablet (325 mg) by oral route once daily   azelastine 137 mcg (0.1 %) nasal aerosol,spray 03/20/2019 spray 2 sprays in each nostril by intranasal route 2 times per day for 30 days   cetirizine 10 mg oral tablet 05/29/2020 take 1 tablet (10 mg) by oral route once daily for 30 days   Chantix 0.5 mg oral tablet  take 1 tablet by oral route BID   cyclobenzaprine 10 mg oral tablet 03/20/2019 take 1 tablet by oral route once a day (at bedtime) for 30 days   Cymbalta 30 mg oral capsule,delayed release(DR/EC) 05/29/2020 take 1 capsule (30 mg) by oral route once daily   diclofenac sodium 50 mg oral tablet,delayed release (DR/EC) 03/27/2020 take 1 tablet (50 mg) by oral route 2 times per day for 30 days   Flonase Allergy Relief 50 mcg/actuation nasal spray,suspension 05/29/2020 spray 1 spray (50 mcg) in each nostril by intranasal route once daily   Lipitor 20 mg oral tablet 05/21/2020 take 1 tablet (20 mg) by oral route once daily at bedtime   montelukast 10 mg oral tablet 05/29/2020 take 1 tablet (10 mg) by oral route once daily in the evening   Nicorette 4 mg buccal gum 05/20/2020 chew 1 piece of gum (4 mg) by oral route every 2 hours as needed and as directed   Vitamin D2 1,250 mcg (50,000 unit) oral capsule 05/21/2020 take 1 capsule by oral route once a week for 12 weeks         Allergy List  Allergen Name Date Reaction Notes   naproxen --  --  --    NSAIDS --  --  --          Family Medical History  Disease Name Relative/Age Notes   Diabetes, unspecified type  Paternal grandparent   Family history of certain chronic disabling diseases; arthritis Father/   Father         Social History  Finding Status Start/Stop Quantity Notes   Alcohol Use --  --/-- --   07/31/2020 - 05/29/2020 - 05/20/2020 - rarely drinks, less than 1 drink per day   Claustophobic Unknown --/-- --  yes   Recreational Drug Use Never --/-- --  no   Single. --  --/-- --  --    Tobacco Current every day --/-- 1/2 pk/day Currently taking Chantix trying to quit   Unemployed. --  --/-- --  --          Immunizations  NameDate Admin Mfg Trade Name Lot Number Route Inj VIS Given VIS Publication   [id number]/01/2019 SKB Fluzone Quadrivalent  NE NE 05/20/2020    Comments:          Review of Systems  · Constitutional  o Denies  o : fever, fatigue, weight loss, weight gain  · HENT  o Admits  o : headaches, vertigo, sinus pain, nasal discharge, postnasal drip, ear pain  · Cardiovascular  o Denies  o : lower extremity edema, claudication, chest pressure, palpitations  · Respiratory  o Denies  o : shortness of breath, wheezing, cough, hemoptysis, dyspnea on exertion  · Gastrointestinal  o Denies  o : nausea, vomiting, diarrhea, constipation, abdominal pain      Physical Examination  · Constitutional  o Appearance  o : alert, in no acute distress, well developed, well-nourished  · Skin and Subcutaneous Tissue  o General Inspection  o : no rashes, normal skin color, warm and dry  o Digits and Nails  o : no clubbing, cyanosis, deformities or edema present, normal appearing nails  · Neurologic  o Mental Status Examination  o : alert and oriented to time, place, and person. Gait and Station: normal gait, able to stand without difficulty  · Psychiatric  o Judgement and Insight  o : judgment and insight intact  o Mood and Affect  o : normal mood and affect          Assessment  · Cough     786.2/R05  · Headache     784.0/R51  · Sinus pressure     478.19/J34.89  · Postnasal drip     784.91/R09.82  · Sinusitis     473.9/J32.9      Plan  · Orders  o ACO-39: Current medications updated and reviewed () - - 07/31/2020  o ACO-14: Influenza immunization administered or previously received () - - 07/31/2020  o Chest  X-Ray 2 views (In-Office) ([id number]) - 786.2/R05 - 07/31/2020  o [facility] NCOV2 (send-out) (74914) - 786.2/R05, 784.0/R51 - 07/31/2020  · Medications  o Levaquin 750 mg oral tablet   SIG: take 1 tablet (750 mg) by oral route once daily for 7 days   DISP: (7) tablets with 0 refills  Prescribed on 07/31/2020     o Medications have been Reconciled  o Transition of Care or Provider Policy  · Instructions  o Take all medications as prescribed/directed.  o Patient was educated/instructed on their diagnosis, treatment and medications prior to discharge from the clinic today.  o Patient instructed to seek medical attention urgently for new or worsening symptoms.  o Call the office with any concerns or questions.  o Discussed Covid-19 precautions including, but not limited to, social distancing, avoid touching your face, and hand washing.   · Disposition  o Call or Return if symptoms worsen or persist.            Electronically Signed by: EDDIE Livingston -Author on August 9, 2020 07:26:02 AM

## 2021-05-14 VITALS
WEIGHT: 212.37 LBS | SYSTOLIC BLOOD PRESSURE: 111 MMHG | HEART RATE: 77 BPM | DIASTOLIC BLOOD PRESSURE: 81 MMHG | HEIGHT: 62 IN | BODY MASS INDEX: 39.08 KG/M2 | OXYGEN SATURATION: 95 %

## 2021-05-14 VITALS
BODY MASS INDEX: 38.37 KG/M2 | OXYGEN SATURATION: 96 % | WEIGHT: 208.5 LBS | HEART RATE: 90 BPM | DIASTOLIC BLOOD PRESSURE: 86 MMHG | HEIGHT: 62 IN | SYSTOLIC BLOOD PRESSURE: 149 MMHG | TEMPERATURE: 97.9 F

## 2021-05-14 VITALS — BODY MASS INDEX: 39.01 KG/M2 | WEIGHT: 212 LBS | HEIGHT: 62 IN

## 2021-05-14 NOTE — PROGRESS NOTES
Progress Note      Patient Name: Autumn Muñoz   Patient ID: [id number]   Sex: Female   YOB: 1960    Primary Care Provider: Jane MORGAN   Referring Provider: SUJATHA MORGAN    Visit Date: April 14, 2021    Provider: MARKY Oliveira   Location: [facility]   Location Address: [address] [place] [address], [address]  [place], [place]  [id number]   Location Phone: [phone]          Chief Complaint  · ER Follow up       History Of Present Illness  Autumn Muñoz is a 61 year old /Black female who presents for evaluation and treatment of:      Patient is here for follow up after ER visit yesterday on 4/13/21. She is a patient of EDDIE Livingston. Patient states was having right side axillary pain and chest pain which is now radiating to left side today. She states she was having chest pressure and stabbing pain with SOA. She states she felt like to couldn't inhale very well.   She states she has a headache today. She states they really didn't find anything wrong. She is very concerned because she is still having issues. Sx started 5 days ago. Per her ER note which I reviewed her EKG was NSR,  CXR: no acute disease. CT chest 5mm right upper lobe nodule; ground glass opacity bilateral lower lobes, no evidence of PE, pneumonia not excluded. Troponin negative. CBC normal. CK normal. CMP non contributory. Magnesium and lipase normal. She was given a Lidocaine patch and sent home. Yesterday took Methocarbamol which helped. Patient states pain started to subside about 2 hours after being in ER.    Was seen a few mth prior in ER; records and labs reviewed. CXR 1/28/21 was negative.    Pt has fibromyalgia: Jane Engle increased Cymbalta to 60mg qd which helped for short period of time with chest pain.       Past Medical History  Disease Name Date Onset Notes   Allergic rhinitis --  --    Allergic rhinitis, chronic --  --    Arthritis --  --    Asthma --  --     Chronic Obstructive Pulmonary Disease --  --    GERD --  --    Headache --  --    High cholesterol --  --    Hyperlipemia --  --    Limb Pain --  --    Limb Swelling --  --    Myofascial muscle pain --  --    Recurrent sinus infections --  --    Jaden's edema of vocal folds --  --    Shortness Of Air --  --    Thyroid nodule --  --    Tinnitus, bilateral --  --    Tobacco abuse --  --    Voice hoarseness --  --          Past Surgical History  Procedure Name Date Notes   Ceasarian section --  --    Uterine fibroid embolization --  --          Medication List  Name Date Started Instructions   aspirin 325 mg oral tablet  take 1 tablet (325 mg) by oral route once daily   atorvastatin 20 mg oral tablet 09/18/2020 TAKE 1 TABLET BY MOUTH ONCE DAILY AT BEDTIME   azelastine 137 mcg (0.1 %) nasal aerosol,spray 03/20/2019 spray 2 sprays in each nostril by intranasal route 2 times per day for 30 days   cyclobenzaprine 10 mg oral tablet 03/20/2019 take 1 tablet by oral route once a day (at bedtime) for 30 days   Cymbalta 60 mg oral capsule,delayed release(DR/EC) 01/28/2021 take 1 capsule (60 mg) by oral route once daily for 30 days   diclofenac sodium 50 mg oral tablet,delayed release (DR/EC) 03/27/2020 take 1 tablet (50 mg) by oral route 2 times per day for 30 days   Flonase Allergy Relief 50 mcg/actuation nasal spray,suspension 05/29/2020 spray 1 spray (50 mcg) in each nostril by intranasal route once daily   ibuprofen 200 mg oral tablet 10/15/2020 take 1 tablet (200 mg) by oral route every 6 hours as needed with food   Lipitor 20 mg oral tablet 05/21/2020 take 1 tablet (20 mg) by oral route once daily at bedtime   meclizine 25 mg oral tablet 11/16/2020 take 1 tablet (25 mg) by oral route once daily as needed   montelukast 10 mg oral tablet 05/29/2020 take 1 tablet (10 mg) by oral route once daily in the evening   Nicorette 4 mg buccal gum 05/20/2020 chew 1 piece of gum (4 mg) by oral route every 2 hours as needed and as  directed   omeprazole 40 mg oral capsule,delayed release(DR/EC) 11/16/2020 take 1 capsule (40 mg) by oral route once daily before a meal   OneTouch Delica Lancets 33 gauge miscellaneous misc 01/28/2021 check blood sugar once day DX E11.9   OneTouch Verio IQ Meter miscellaneous kit 01/28/2021 check blood sugar once daily DX E11.9   OneTouch Verio test strips miscellaneous strip 01/28/2021 check blood sugar once daily DX E11.9   Tylenol Arthritis Pain 650 mg oral tablet extended release 01/28/2021 take 1 tablets (650mg) by oral route every 8 hours swallowing whole with water. Do not break, crush, dissolve and/or chew. for 30 days   Xyzal 5 mg oral tablet 11/16/2020 take 1 tablet (5 mg) by oral route once daily in the evening for 30 days         Allergy List  Allergen Name Date Reaction Notes   naproxen --  --  --    NSAIDS --  --  --        Allergies Reconciled  Family Medical History  Disease Name Relative/Age Notes   Diabetes, unspecified type  Paternal grandparent   Family history of colon cancer Grandmother (maternal)/30s   Grandmother (maternal)/30s   Family history of breast cancer Sister/20s   Sister/20s; Aunt; Uncle/70s   Family history of certain chronic disabling diseases; arthritis Father/   Father         Social History  Finding Status Start/Stop Quantity Notes   Alcohol Use --  --/-- --  01/07/2021 - 10/26/2020 - 10/15/2020 - 10/15/2020 - 07/31/2020 - 05/29/2020 - 05/20/2020 - rarely drinks, less than 1 drink per day   Claustophobic Unknown --/-- --  yes   Recreational Drug Use Never --/-- --  no   Single. --  --/-- --  --    Tobacco Current every day --/-- 1/2 pk/day Currently taking Chantix trying to quit   Unemployed. --  --/-- --  --          Immunizations  NameDate Admin Mfg Trade Name Lot Number Route Inj VIS Given VIS Publication   [id number]/01/2019 SKB Fluzone Quadrivalent  NE NE 05/20/2020    Comments:          Review of Systems  · Constitutional  o Admits  o : fatigue  o Denies  o : night  "sweats  · Eyes  o Denies  o : double vision, blurred vision  · HENT  o Denies  o : vertigo, recent head injury  · Breasts  o Denies  o : abnormal changes in breast size, additional breast symptoms except as noted in the HPI  · Cardiovascular  o Admits  o : chest pain  o Denies  o : irregular heart beats  · Respiratory  o Admits  o : shortness of breath  o Denies  o : productive cough  · Gastrointestinal  o Denies  o : nausea, vomiting  · Genitourinary  o Denies  o : dysuria, urinary retention  · Integument  o Denies  o : hair growth change, new skin lesions  · Neurologic  o Denies  o : altered mental status, seizures  · Musculoskeletal  o Admits  o : muscle pain  o Denies  o : joint swelling, limitation of motion  · Endocrine  o Denies  o : cold intolerance, heat intolerance  · Heme-Lymph  o Denies  o : petechiae, lymph node enlargement or tenderness  · Allergic-Immunologic  o Denies  o : frequent illnesses      Vitals  Date Time BP Position Site L\R Cuff Size HR RR TEMP (F) WT  HT  BMI kg/m2 BSA m2 O2 Sat FR L/min FiO2        04/14/2021 12:18 /86 Sitting    90 - R  97.9 208lbs 8oz 5'  2\" 38.13 2.03 96 %  21%          Physical Examination  · Constitutional  o Appearance  o : well developed, well-nourished, no acute distress  · Head and Face  o Head  o : normocephalic, atraumatic  · Neck  o Inspection/Palpation  o : normal appearance, no masses or tenderness, trachea midline  o Thyroid  o : gland size normal, nontender, no nodules or masses present on palpation  · Respiratory  o Respiratory Effort  o : breathing unlabored  o Inspection of Chest  o : chest rise symmetric bilaterally  o Auscultation of Lungs  o : clear to auscultation bilaterally throughout inspiration and expiration  · Cardiovascular  o Heart  o :   § Auscultation of Heart  § : regular rate and rhythm, no murmurs, gallops or rubs  o Peripheral Vascular System  o :   § Extremities  § : no edema  · Lymphatic  o Neck  o : no cervical " lymphadenopathy, no supraclavicular lymphadenopathy  · Psychiatric  o Mood and Affect  o : mood normal, affect appropriate     Bilateral axilla tender to palpation without Lymphadenopathy noted. Mid sternum tender to palpation.               Assessment  · Pneumonia     486/J18.9  · Myalgia     729.1/M79.10  · Abnormal CT scan, chest     793.2/R93.89  · Fibromyalgia     729.1/M79.7       ER records discussed with patient. Will check for COVID. Start Doxy 100mg bid x 10 days and f/u with RAMESH Engle in about 2 weeks. Will also check autoimmune labs. Patient advised to seek immediate medical attention for worsening sx.     Problems Reconciled  Plan  · Orders  o ACO-39: Current medications updated and reviewed (1159F, ) - - 04/14/2021  o [facility] NCOV2 (send-out) (03690) - 486/J18.9 - 04/14/2021  o RA Profile 1 (Uric Acid, ASO, RF IgM, IVON, CRP) [facility] (90497, 31071, 89833, 68145, 43546) - 729.1/M79.10 - 04/14/2021  o ESR (35837) - 729.1/M79.10 - 04/14/2021  · Medications  o doxycycline hyclate 100 mg oral tablet   SIG: take 1 tablet (100 mg) by oral route 2 times per day for 10 days   DISP: (20) Tablet with 0 refills  Prescribed on 04/14/2021     o Medications have been Reconciled  o Transition of Care or Provider Policy  · Instructions  o Take all medications as prescribed/directed.  o Patient was educated/instructed on their diagnosis, treatment and medications prior to discharge from the clinic today.  o Patient instructed to seek medical attention urgently for new or worsening symptoms.  o Call the office with any concerns or questions.  o Chronic conditions reviewed and taken into consideration for today's treatment plan.  o Discussed Covid-19 precautions including, but not limited to, social distancing, avoid touching your face, and hand washing.   o Electronically Identified Patient Education Materials Provided Electronically  · Disposition  o Call or Return if symptoms worsen or persist.  o Follow Up  2 weeks.            Electronically Signed by: MARKY Oliveira -Author on April 15, 2021 10:48:17 AM

## 2021-05-14 NOTE — PROGRESS NOTES
Quick Note      Patient Name: Autumn Muñoz   Patient ID: [id number]   Sex: Female   YOB: 1960    Primary Care Provider: Belinda MORGAN   Referring Provider: SUJATHA MORGAN    Visit Date: January 28, 2021    Provider: EDDIE Livingston   Location: [facility]   Location Address: [address] [place] [address], [address]  [place], [place]  [id number]   Location Phone: [phone]          History Of Present Illness  TELEHEALTH TELEPHONE VISIT  Autumn Muñoz is a 60 year old /Black female who is presenting for evaluation via telehealth telephone visit. Verbal consent obtained before beginning visit.   Provider spent 20 minutes with patient during telehealth visit.   The following staff were present during this visit: BELINDA MORGAN/ DAYTON WEBER MA   Past Medical History/Overview of Patient Symptoms     Pt states that she has been having leg pain for couple months now.    Pt states that it first started out in her right knee then to her right foot and groin area and just started to have pain in her left side of groin area. She felt like she hyperextended it in November and it has been giving her fits since then. Denies warmth or swelling in the area. Stepping up or stepping down causes pain. She has been elevating her leg at night. Denies recent travel or surgery. Admits she smokes 1/2 ppd. Admits heaviness and pain behind the knee. States she can feel a knot behind her right knee. Denies SOA. Denies hx of DVT. Ordered b/l VELE and right knee x-ray.    Pt states that she has been taking Aspirin 81mg QPM for pain.    Pt needs prescription for glucose meter, refill on IBU 200mg, Tylenol Arthritis. Script given for new meter, Refilled Tylenol Arthritis but did not refill ibuprofen since she is taking Cymbalta.    Pt would like to know if she could get increase in dose in Cymbalta d/t her fibromyalgia getting worse. Increased to 60mg qd.       Physical  Examination  · Neurologic  o Mental Status Examination  o : alert and oriented to time, place, and person. Gait and Station: normal gait, able to stand without difficulty  · Psychiatric  o Judgment and Insight  o : judgment and insight intact  o Mood and Affect  o : normal mood and affect          Assessment  · Diabetes mellitus, type 2     250.00/E11.9  · Fatigue     780.79/R53.83  · GERD (gastroesophageal reflux disease)     530.81/K21.9  · Hyperlipidemia     272.4/E78.5  · Vitamin D deficiency     268.9/E55.9  · Fibromyalgia     729.1/M79.7  · Knee injury     959.7/S89.90XA  · Smoker     305.1/F17.200  · Right leg pain     729.5/M79.604  · Left groin pain     789.04/R10.32      Plan  · Orders  o Hgb A1c [facility] (19689) - 250.00/E11.9 - 01/28/2021  o Physical, Primary Care Panel (CBC, CMP, Lipid, TSH) [facility] (98317, 61233, 07470, 39036) - 250.00/E11.9, 272.4/E78.5, 268.9/E55.9 - 01/28/2021  o Vitamin D (25-Hydroxy) Level (66100) - 268.9/E55.9 - 01/28/2021  o ACO - Pt declines to or was not able to provide an Advance Care Plan or name a Surrogate Decision Maker (1124F) - - 01/28/2021  o Physician Telephone Evaluation, 11-20 minutes (66360) - - 01/31/2021  o ACO-39: Current medications updated and reviewed (1159F, ) - - 01/28/2021  o VELE (Dop Scan) Bilateral. (19933) - 959.7/S89.90XA, 305.1/F17.200, 729.5/M79.604, 789.04/R10.32 - 01/28/2021  o Knee (Right) 3 views X-Ray [facility] ([id number]) - 959.7/S89.90XA, 729.5/M79.604 - 01/28/2021  o Vitamin B12 level (37647) - 780.79/R53.83 - 01/28/2021  · Medications  o OneTouch Verio IQ Meter miscellaneous kit   SIG: check blood sugar once daily DX E11.9   DISP: (1) Device with 0 refills  Prescribed on 01/28/2021     o OneTouch Delica Lancets 33 gauge miscellaneous misc   SIG: check blood sugar once day DX E11.9   DISP: (1) Box with 0 refills  Prescribed on 01/28/2021     o OneTouch Verio test strips miscellaneous strip   SIG: check blood sugar once daily DX E11.9    DISP: (1) Box with 0 refills  Prescribed on 01/28/2021     o Tylenol Arthritis Pain 650 mg oral tablet extended release   SIG: take 1 tablets (650mg) by oral route every 8 hours swallowing whole with water. Do not break, crush, dissolve and/or chew. for 30 days   DISP: (90) Tablet with 0 refills  Prescribed on 01/28/2021     o Cymbalta 60 mg oral capsule,delayed release(DR/EC)   SIG: take 1 capsule (60 mg) by oral route once daily for 30 days   DISP: (30) Capsule with 0 refills  Adjusted on 01/28/2021     o Medications have been Reconciled  o Transition of Care or Provider Policy  · Instructions  o Advised that cheeses and other sources of dairy fats, animal fats, fast food, and the extras (candy, pastries, pies, doughnuts and cookies) all contain LDL raising nutrients. Advised to increase fruits, vegetables, whole grains, and to monitor portion sizes.   o Plan Of Care:   o Take all medications as prescribed/directed.  o Call the office with any concerns or questions.  o Electronically Identified Patient Education Materials Provided Electronically  · Disposition  o Call or Return if symptoms worsen or persist.            Electronically Signed by: EDDIE Livingston -Author on January 31, 2021 10:04:07 PM

## 2021-05-15 VITALS
BODY MASS INDEX: 40.48 KG/M2 | HEART RATE: 99 BPM | SYSTOLIC BLOOD PRESSURE: 122 MMHG | RESPIRATION RATE: 16 BRPM | TEMPERATURE: 98 F | WEIGHT: 220 LBS | DIASTOLIC BLOOD PRESSURE: 84 MMHG | HEIGHT: 62 IN | OXYGEN SATURATION: 99 %

## 2021-05-15 VITALS — OXYGEN SATURATION: 95 % | HEART RATE: 81 BPM | TEMPERATURE: 97.9 F

## 2021-05-15 VITALS — BODY MASS INDEX: 40.48 KG/M2 | WEIGHT: 220 LBS | HEIGHT: 62 IN

## 2021-05-15 VITALS — BODY MASS INDEX: 40.3 KG/M2 | HEIGHT: 62 IN | WEIGHT: 219 LBS | OXYGEN SATURATION: 98 % | HEART RATE: 91 BPM

## 2021-05-15 VITALS — TEMPERATURE: 98.6 F

## 2021-05-16 VITALS
BODY MASS INDEX: 37.54 KG/M2 | DIASTOLIC BLOOD PRESSURE: 78 MMHG | HEIGHT: 62 IN | WEIGHT: 204 LBS | HEART RATE: 78 BPM | SYSTOLIC BLOOD PRESSURE: 112 MMHG

## 2021-05-24 ENCOUNTER — HOSPITAL ENCOUNTER (OUTPATIENT)
Dept: VACCINE CLINIC | Facility: HOSPITAL | Age: 61
Discharge: HOME | End: 2021-05-24
Attending: INTERNAL MEDICINE

## 2021-05-24 ENCOUNTER — OFFICE VISIT CONVERTED (OUTPATIENT)
Dept: OTOLARYNGOLOGY | Facility: CLINIC | Age: 61
End: 2021-05-24
Attending: OTOLARYNGOLOGY

## 2021-05-27 ENCOUNTER — TRANSCRIBE ORDERS (OUTPATIENT)
Dept: OTOLARYNGOLOGY | Facility: CLINIC | Age: 61
End: 2021-05-27

## 2021-05-27 DIAGNOSIS — R51.9 CHRONIC DAILY HEADACHE: Primary | ICD-10-CM

## 2021-05-27 DIAGNOSIS — E04.9 THYROID ENLARGEMENT: Primary | ICD-10-CM

## 2021-05-28 VITALS
OXYGEN SATURATION: 97 % | BODY MASS INDEX: 38.09 KG/M2 | DIASTOLIC BLOOD PRESSURE: 95 MMHG | TEMPERATURE: 98.5 F | OXYGEN SATURATION: 96 % | BODY MASS INDEX: 38.09 KG/M2 | HEART RATE: 90 BPM | RESPIRATION RATE: 14 BRPM | SYSTOLIC BLOOD PRESSURE: 123 MMHG | OXYGEN SATURATION: 98 % | WEIGHT: 207 LBS | DIASTOLIC BLOOD PRESSURE: 73 MMHG | RESPIRATION RATE: 18 BRPM | HEIGHT: 62 IN | DIASTOLIC BLOOD PRESSURE: 67 MMHG | DIASTOLIC BLOOD PRESSURE: 56 MMHG | WEIGHT: 207 LBS | WEIGHT: 207 LBS | BODY MASS INDEX: 38.09 KG/M2 | RESPIRATION RATE: 14 BRPM | HEART RATE: 77 BPM | HEIGHT: 62 IN | HEIGHT: 62 IN | WEIGHT: 207 LBS | HEART RATE: 97 BPM | SYSTOLIC BLOOD PRESSURE: 129 MMHG | TEMPERATURE: 98.5 F | BODY MASS INDEX: 38.09 KG/M2 | TEMPERATURE: 98.2 F | RESPIRATION RATE: 16 BRPM | HEART RATE: 62 BPM | HEIGHT: 62 IN | OXYGEN SATURATION: 96 % | TEMPERATURE: 98.2 F | SYSTOLIC BLOOD PRESSURE: 114 MMHG | SYSTOLIC BLOOD PRESSURE: 124 MMHG

## 2021-05-28 VITALS
HEART RATE: 84 BPM | DIASTOLIC BLOOD PRESSURE: 77 MMHG | RESPIRATION RATE: 14 BRPM | OXYGEN SATURATION: 98 % | HEIGHT: 62 IN | WEIGHT: 217.5 LBS | SYSTOLIC BLOOD PRESSURE: 135 MMHG | TEMPERATURE: 98.2 F | BODY MASS INDEX: 40.03 KG/M2

## 2021-05-28 NOTE — PROGRESS NOTES
Patient: MAHNAZ PEREZ     Acct: [id number]     Report: #[id number]  UNIT #: [id number]     : 1960    Encounter Date:10/28/2019  PRIMARY CARE: MD JENNIFER  ***Signed***  --------------------------------------------------------------------------------------------------------------------  Chief Complaint      Encounter Date      Oct 28, 2019            Primary Care Provider      SONIDO WHALEN            Referring Provider      SONIDO WHALEN            Patient Complaint      Patient is complaining of      Patient here today for follow up and to discuss test results, Bronchitis            VITALS      Height 62 in / 157.48 cm      Weight 207 lbs  / 93.239580 kg      BSA 1.94 m2      BMI 37.9 kg/m2      Temperature 98.2 F / 36.78 C - Oral      Pulse 62      Respirations 14      Blood Pressure 124/73 Sitting, Left Arm      Pulse Oximetry 96%, room air            HPI      The patient is a very pleasant 59 year old  female patient of     Dr. Sánchez's last seen by me for an acute visit 6 weeks ago. At that time I     treated her for an acute on chronic bronchitis exacerbation with doxycycline and    prednisone and she feels much improved. She denies increased dyspnea, coughing     or wheezing, hemoptysis, fever or chills. She still has some baseline dyspnea on    exertion with moderate exertion relieved with rest. I started her on Stiolto for    suspected chronic obstructive pulmonary disease and that is helping per her     report. She is complaining of vertigo type symptoms today, dizziness when she     turns her head to the side or leans forward. This has been ongoing for several     weeks and she not yet sought evaluation and treatment for this. She is having     some postnasal drip despite using flonase and astelin and plans to follow up     with ENT for this. She is using nicotine gum I prescribed and feels it is     helping. She reports being down to 5 cigarettes per day and is trying to  quit     all together. She had pulmonary function test and 6 minute walk test since her     last office visit.  6 minute walk test did no show any desaturations. Pulmonary     function test were not consistent with obstruction but she did have moderately     decreased diffusion capacity at 56%.             I reviewed her Review of Systems, medical, surgical and family history and agree    with those as entered.      Copies To:   Harsh Sánchez      Constitutional:  Denies: Fatigue, Fever, Weight gain, Weight loss, Chills,     Insomnia, Other      Respiratory/Breathing:  Complains of: Shortness of air, Wheezing, Cough; Denies:    Hemoptysis, Pleuritic pain, Other      Endocrine:  Denies: Polydipsia, Polyuria, Heat/cold intolerance, Abnorml     menstrual pattern, Diabetes, Other      Eyes:  Denies: Blurred vision, Vision Changes, Other      Ears, nose, mouth, throat:  Complains of: Nasal discharge, Nasal congestion;     Denies: Mouth lesions, Thrush, Throat pain, Hoarseness, Allergies/Hay Fever,     Post Nasal Drip, Headaches, Recent Head Injury, Nose Bleeding, Neck Stiffness,     Thyroid Mass, Hearing Loss, Ear Fullness, Dry Mouth, Nasal or Sinus Pain, Dry     Lips, Other      Cardiovascular:  Denies: Palpitations, Syncope, Claudication, Chest Pain, Wake     up Gasping for air, Leg Swelling, Irregular Heart Rate, Cyanosis, Dyspnea on     Exertion, Other      Gastrointestinal:  Denies: Nausea, Constipation, Diarrhea, Abdominal pain,     Vomiting, Difficulty Swallowing, Reflux/Heartburn, Dysphagia, Jaundice,     Bloating, Melena, Bloody stools, Other      Genitourinary:  Denies: Urinary frequency, Incontinence, Hematuria, Urgency,     Nocturia, Dysuria, Testicular problems, Other      Musculoskeletal:  Denies: Joint Pain, Joint Stiffness, Joint Swelling, Myalgias,    Other      Hematologic/lymphatic:  DENIES: Lymphadenopathy, Bruising, Bleeding tendencies,     Other      Neurological:  Denies:  Headache, Numbness, Weakness, Seizures, Other      Psychiatric:  Denies: Anxiety, Appropriate Effect, Depression, Other      Sleep:  No: Excessive daytime sleep, Morning Headache?, Snoring, Insomnia?, Stop    breathing at sleep?, Other      Integumentary:  Denies: Rash, Dry skin, Skin Warm to Touch, Other      Immunologic/Allergic:  Denies: Latex allergy, Seasonal allergies, Asthma,     Urticaria, Eczema, Other      Immunization status:  No: Up to date            FAMILY/SOCIAL/MEDICAL HX      Stroke - Family Hx:  Grandparent      Diabetes - Family Hx:  Father, Grandparent      Cancer/Type - Family Hx:  Sister, Aunt      Is Father Still Living?:  No      Is Mother Still Living?:  Yes      Smoking status:  Current every day smoker (0.5 PPD x30 years )       Section:  Yes      Hysterectomy:  No      Anticoagulation Therapy:  No      Antibiotic Prophylaxis:  Yes      Medical History:  Yes: Asthma, Shortness Of Breath (BRONCHITIS); No: Blood     Disease, Chemotherapy/Cancer, Hemorrhoids/Rectal Prob, Reflux Disease      Psychiatric History      none            PREVENTION      Hx Influenza Vaccination:  Yes      Date Influenza Vaccine Given:  2018      Influenza Vaccine Declined:  No      2 or More Falls Past Year?:  No      Fall Past Year with Injury?:  No      Hx Pneumococcal Vaccination:  No      Encouraged to follow-up with:  PCP regarding preventative exams.      Chart initiated by      Tomeka Engle CMA            ALLERGIES/MEDICATIONS      Medications    Last Reconciled on 10/28/19 09:59 by MARKY MENJIVAR      Tiotropium Br/Olodaterol HCl (Stiolto Respimat Inhal Spray) 4 Gm Mist.inhal      2 PUFFS INH RTQDAY, #1 INH 5 Refills         Prov: Gillian Pena PA-C         19       Ibuprofen (Ibuprofen) 800 Mg Tablet      800 MG PO Q8H PRN for MILD PAIN (1-3)/TEMP OVER 101 for 30 Days, #90 TAB 0     Refills         Prov: Gillian Pena PA-C         19       Nicotine Polacrilex  (Nicorette) 2 Mg Gum      2 MG PO Q4-6H, #180 BOX 2 Refills         Prov: Gillian Pena PA-C         9/19/19       buPROPion HCl (buPROPion HCl) 150 Mg Tab.er.12h      150 MG PO BID for 30 Days, #60 TAB.ER.12H 3 Refills         Prov: GonzaloHarsh         4/4/19       Acetaminophen Es (Tylenol Extra Strength) 500 Mg Tablet      500 MG PO Q6H PRN for PAIN OR FEVER, #100 TAB 0 Refills         Reported         4/4/19       Cyclobenzaprine Hcl (Cyclobenzaprine*) 10 Mg Tablet      10 MG PO QDAY PRN for MUSCLE SPASMS, TAB 0 Refills         Reported         4/4/19       Cetirizine Hcl (CETIRIZINE HCL) 10 Mg Tablet      10 MG PO QDAY, #30 TAB 0 Refills         Reported         4/4/19       MDI-Albuterol (Proventil HFA) 60 Puffs/Inh Puffs      2 PUFF INH Q4HPRN, #1         Prov: HILL TOLEDO         5/17/12       Ascorbic Acid (Kanika C*) 500 Mg Tablet      500 MG PO QDAY         Reported         5/17/12      Current Medications      Current Medications Reviewed 10/28/19            EXAM      GEN-patient appears stated age resting comfortable in no acute distress      Eyes-PERRL,  conjunctiva are normal in appearance extraocular muscles are     intact, no scleral icterus      Lymphatic-no swollen or enlarged cervical nodes, or axillary node, or femoral     nodes, or supraclavicular nodes      Mouth normal dentition, no erythema no ulcerations oropharynx appears normal no     exudate no evidence of postnasal drip, MP       Neck-there are no palpable supraclavicular or cervical adenopathy, thyroid is     normal in appearance no apparent nodules, there is no inspiratory or expiratory     stridor      Respiratory-Mildly decreased breath sounds throughout, no wheezes, rhonchi or     crackles, normal work of breathing noted.        Cardiovascular-the heart rate is normal and regular S1 and S2 present with no     murmur or extra heart sounds, there is no JVD or pedal edema present      GI-the abdomen is normal in appearance, bowel  sounds present and normal in all     quadrants no hepatosplenomegaly or masses felt      Extremities-no clubbing is present, pulses present in all extremities, capillary    refill time is normal      Musculoskeletal-Normal strength in upper and lower extremities, inspection shows    no evidence of muscle atrophy      Skin-skin is normal in appearance it is warm and dry, no rashes present, no     evidence of cyanosis, palpation reveals no masses      Neurological-the patient is alert and oriented to time place and person, moves     all 4 extremities, normal gait, normal affect and mood, CN2-12 intact      Psych-normal judgment and insight is good, normal mood and affect, alert and laurie    ented to person, place, and time, and date      Vtials      Vitals:             Height 62 in / 157.48 cm           Weight 207 lbs  / 93.197035 kg           BSA 1.94 m2           BMI 37.9 kg/m2           Temperature 98.2 F / 36.78 C - Oral           Pulse 62           Respirations 14           Blood Pressure 124/73 Sitting, Left Arm           Pulse Oximetry 96%, room air            REVIEW      Results Reviewed      P[facility] Results Reviewed?:  Yes Prev Lab Results, Yes Prev Radiology Results, Yes     Previous [facility] Records            Assessment      SOB (shortness of breath) - R06.02            Notes      New Medications      * Meclizine Hcl (Meclizine*) 25 MG TABLET: 25 MG PO BID PRN VERTIGO 10 Days #20      * Buck-Fluticasone (Fluticasone 50 mcg) 16 GM SPRAY.SUSP: 1 PUFFS NARE EACH QDAY       #1         Dx: SOB (shortness of breath) - R06.02      * AZELASTINE HCL (Azelastine Nasal) 137 MCG/0.137 ML SPRAY.PUMP: 1 PUFFS NARE EA      CH QDAY #1         Dx: SOB (shortness of breath) - R06.02      Discontinued Medications      * Doxycycline Hyclate (Doxycycline Hyclate*) 100 MG CAPSULE: 100 MG PO BID 7       Days #14      * predniSONE* 10 MG TABLET: 10 MG PO ASDIR #48         Instructions: 60mg x 3days, 40mg x 3days, 30mg x 3days, 20mg x  3days, 10mg x        3days      New Diagnostics      * Echo Complete, Week         Dx: SOB (shortness of breath) - R06.02      New Office Procedures      * Pneumovax-23, As Soon As Possible         Pneumococcal Vaccine Polyvalen (Pneumovax-23) 25 MCG/0.5 ML VIAL: 25        MICROGRAM INTRAMUSCULARLY Qty 25 MCG      * Flu Vacc Fluarix Quadrivalent, Routine         Flu Vacc [id number](6Mos Up)/Pf (Fluarix Quadrivalent [id number] Syringe) 60        MCG/0.5 ML SYRINGE: 60 MICROGRAM INTRAMUSCULARLY Qty 1 SYRINGE      ASSESSMENT:      1. Likely early chronic obstructive pulmonary disease.       2. Chronic bronchitis.       3. Left lower lobe pulmonary nodule stable since 2008.       4. Ongoing tobacco abuse of cigarettes with 30 pack year history.      5. Atypical chest pain longstanding and unchanged.        6. Obesity with BMI 37.9.      7. Suspected vertigo.      8. Moderately decreased diffusion capacity on pulmonary function test.             PLAN:      1. I have discussed with the patient regarding her test results as above. She     has recently had CBC done that did not show any significant anemia. I will get     an echocardiogram for further evaluation of her decreased diffusion capacity and    exertional dyspnea. This finding could also be due to her long standing and     significant smoking history.       2. The patient has had improvement on Stiolto 2 puffs once daily,  we will     continue this along with albuterol as needed.       3. She is enrolled in low dose lung cancer screening CT scan and is due for her     next CT scan of the chest in January 2020.      4. I counseled the patient again for 5 minutes on smoking cessation, offered     nicotine replacement therapy and  pharmacotherapy and she plans to continue     using nicotine gum. She also has a prescription for wellbutrin prescribed her     primary care provider and I encouraged her to start that.       5. The patient is having symptoms consistent with  vertigo so I have prescribed     Meclizine to use as needed for this. If this does not improve she is to follow     up with her primary care provider for further evaluation and treatment.       6. Continue current allergy medication with zyrtec, flonase and astelin. She     plans to follow up with ENT regarding her postnasal drip and ear pain. I am in     agreement with this.       7. We will give the patient a flu vaccine and Pneumovax today.       8.  Follow up with Dr. Sánchez in 3-4 months to discuss test results and see how    she is doing, sooner if needed.            Patient Education      Tobacco Cessation Counseling:  for 3 - 10 minutes      Patient Education Provided:  COPD, How to use an Inhaler, Smoking Cessation      Time Spent:  > 50% /Coord Care            Electronically signed by BUBBA GUSTAFSON PA-C  11/01/2019 13:00       Disclaimer: Converted document may not contain table formatting or lab diagrams. Please see [facility] for the authenticated document.

## 2021-05-28 NOTE — PROGRESS NOTES
Patient: AUTUMN PEREZ     Acct: [id number]     Report: #[id number]  UNIT #: [id number]     : 1960    Encounter Date:2020  PRIMARY CARE: NONE,MD  ***Signed***  --------------------------------------------------------------------------------------------------------------------  TELEHEALTH NOTE      History of Present Illness            Chief Complaint: F/U            Autumn Perez is presenting for evaluation via Telehealth visit. Verbal     consent obtained before beginning visit.            Provider spent (21) minutes with the patient during telehealth visit.            The following staff were present during the visit: Diana Pena PA-C, Lissa Ely MA            The patient is a 60 year old female patient of Dr. Sánchez's known to me from     several prior visits.  When I last did a TeleHealth visit with her about six     weeks ago, she was doing better from prior after being treated for sinusitis     with a course of Levaquin by her ENT provider, Dr. Juares.  She is currently     denying fever, chills, hemoptysis or purulent sputum production.  She feels her     breathing is about at her baseline and declines increased coughing.  She does     report that she is having some wheezing. Her main concerns are about going back     to work. She is apparently suppose to go back to work at [employer] early next     week and is requesting documentation stating that she is high risk so that she     can be furloughed for another month. She has not had obstruction on previous     PFTs, but has had moderately decreased diffusion capacity likely secondary to     her previous smoking. She has about a 30 pack year smoking history and continues     to smoke cigarettes. She feels that she cannot breath as well when she is     wearing a mask and will have to wear a mask at work.              I have reviewed ROS, medical, surgical and family history and agree with those     as entered in the chart.  I  personally reviewed previous lab work, imaging and     provider notes.                           Past Med History      Bronchitis      Solitary Pulmonary Nodule      Current smoker ( 1 ppd x 30 years, currently .5 ppd)      Flu-current      Pneumonia-current      Overview of Symptoms      Complaints- Pleurisy, wheezing      Denies-SOA, Cough, fever, chills, headaches.            Allergies/Medications      Allergies:        Coded Allergies:             NAPROXEN (Verified  Allergy, Intermediate, 2/13/20)      Medications    Last Reconciled on 5/14/20 09:59 by MARKY MENJIVAR      Varenicline Tartrate (Chantix) 1 Mg Tablet      1 MG PO BID for 30 Days, #60 TAB         Reported         5/14/20       Montelukast Sodium (Singulair*) 10 Mg Tablet      10 MG PO HS, #30 TAB 5 Refills         Prov: Gillian Pena PA-C         4/1/20       Tiotropium Br/Olodaterol HCl (Stiolto Respimat Inhal Spray) 4 Gm Mist.inhal      2 PUFFS INH QDAY, #1 INH 5 Refills         Prov: Gillian Pena PA-C         4/1/20       Omeprazole (Omeprazole*) 40 Mg Capsule      40 MG PO QDAY, #30 CAP 5 Refills         Prov: Gillian Pena PA-C         2/13/20       Azelastine Hcl (Azelastine Nasal) 137 Mcg/0.137 Ml Spray.pump      1 PUFFS NARE EACH QDAY, #1 BOTTLE         Prov: Gillian Pena PA-C         2/13/20       Buck-Fluticasone (Fluticasone 50 mcg) 16 Gm Spray.susp      1 PUFFS NARE EACH QDAY, #1 BOTTLE 4 Refills         Prov: Gillian Pena PA-C         2/13/20       Nicotine Polacrilex (Nicorette) 2 Mg Gum      2 MG PO Q4-6H, #180 BOX 2 Refills         Prov: Gillian Pena PA-C         2/13/20       Meclizine Hcl (Meclizine*) 25 Mg Tablet      25 MG PO BID PRN for VERTIGO for 10 Days, #20 TAB 0 Refills         Prov: Gillian Pena PA-C         10/28/19       Ibuprofen (Ibuprofen) 800 Mg Tablet      800 MG PO Q8H PRN for MILD PAIN (1-3)/TEMP OVER 101 for 30 Days, #90 TAB 0     Refills         Prov: Gillian Pena PA-C          9/19/19       buPROPion SR (buPROPion SR) 150 Mg Tab.er.12h      150 MG PO BID for 30 Days, #60 TAB.ER.12H 3 Refills         Prov: Harsh Sánchez         4/4/19       Acetaminophen Es (Tylenol Extra Strength) 500 Mg Tablet      500 MG PO Q6H PRN for PAIN OR FEVER, #100 TAB 0 Refills         Reported         4/4/19       Cyclobenzaprine Hcl (Cyclobenzaprine*) 10 Mg Tablet      10 MG PO QDAY PRN for MUSCLE SPASMS, TAB 0 Refills         Reported         4/4/19       Cetirizine Hcl (CETIRIZINE HCL) 10 Mg Tablet      10 MG PO QDAY, #30 TAB 0 Refills         Reported         4/4/19       MDI-Albuterol (Proventil HFA) 60 Puffs/Inh Puffs      2 PUFF INH Q4HPRN, #1         Prov: HILL TOLEDO         5/17/12       Ascorbic Acid (Kanika C*) 500 Mg Tablet      500 MG PO QDAY         Reported         5/17/12            Plan/Instructions      Ambulatory Assessment/Plan:        Notes      New Medications      * VARENICLINE TARTRATE (Chantix) 1 MG TABLET: 1 MG PO BID 30 Days #60         Instructions: FOR SMOKING CESSATION      * Fluticasone (Flovent HFA) 110 MCG INHALER: 2 PUFFS INH RTBID 30 Days #1      * Fluticasone Furoate (Arnuity Ellipta) 100 MCG BLST.W.DEV: 1 PUFF INH QDAY #1      Plan/Instructions            * Plan Of Care: ()            * Chronic conditions reviewed and taken into consideration for today's treatment       plan.      * Patient instructed to seek medical attention urgently for new or worsening       symptoms.      * Patient was educated/instructed on their diagnosis, treatment and medications       prior to discharge from the clinic today.            ASSESSMENT:       1. Likely early COPD without acute exacerbation.      2. Recent bronchitis.      3.  Chronic sinusitis and postnasal drip on Flonase and Astelin followed by Dr. Juares from ENT.      4. Ongoing tobacco abuse with cigarettes with 30 pack year history.      5. Moderately decreased diffusion capacity on PFTs likely secondary to heavy     smoking  history.            PLAN:      1.  At this time I have discussed with patient regarding current symptoms,     returning to work and mask wearing and have answered all of her numerous     questions today.  I will have her continue on Stiolto at two puffs once daily     and will add Flovent with two puffs twice a day to see if this helps further     with her respiratory symptoms as she is concerned that her breathing is worse     when she wears a mask.      2. I will have her continue albuterol as needed.  She is to continue omeprazole     for her gastroesophageal reflux disease.        3. I will have her continue Zyrtec, Singulair, Flonase and Astelin for seasonal     allergies and postnasal drip.        4. She will continue following with Dr. Juares from ENT for chronic sinusitis and     postnasal drip.      5. I have answered her questions today regarding COVID19. I have discussed with     patient that I am concerned that her symptoms will worsen if she does not quit     smoking. I have counseled her for five minutes today on smoking cessation. She     is already on Wellbutrin and nicotine gum.        6.  I discussed with the patient that I am concerned that lung function will     continue to decline, respiratory symptoms will worsen and she is at increased     risk of cardiovascular disease and various cancers if she continues to smoke.         7. I will have her keep her upcoming follow up appointment in six weeks with Dr. Sánchez, sooner if needed.      Codes:  Phone Eval 21-30 mi [id number]            Electronically signed by BUBBA GUSTAFSON PA-C  06/02/2020 14:30       Disclaimer: Converted document may not contain table formatting or lab diagrams. Please see [facility] for the authenticated document.

## 2021-05-28 NOTE — PROGRESS NOTES
Patient: MAHNAZ PEREZ     Acct: [id number]     Report: #[id number]  UNIT #: [id number]     : 1960    Encounter Date:2020  PRIMARY CARE: NONE,MD  ***Signed***  --------------------------------------------------------------------------------------------------------------------  Chief Complaint      Encounter Date      2020            Primary Care Provider      SONIDO WHALEN            Referring Provider      SONIDO WHALEN            Patient Complaint      Patient is complaining of      Patient is here for 4 month f/u, SOA            VITALS      Height 5 ft 2 in / 157.48 cm      Weight 207 lbs  / 93.206718 kg      BSA 1.94 m2      BMI 37.9 kg/m2      Temperature 98.5 F / 36.94 C - Oral      Pulse 97      Respirations 16      Blood Pressure 129/67 Sitting, Right Arm      Pulse Oximetry 96%, room air            HPI      The patient is a very pleasant 59 year old  female patient of     Dr. Sánchez's also known to me from a previous office visit. She is here today     for 4 months follow up and is overall doing fairly well. She currently denies     any dyspnea, cough or wheezing, hemoptysis, fever or chills. She is down to     smoking 2 cigarettes per day with the help of Nicorette gum and is trying to     quit smoking. When I saw her last she was on Stiolto but has her breathing has     improved she is no longer using that and is only using albuterol as needed.  She    typically needs albuterol once a week or less. She is complaining of some acid     reflux symptoms especially when she lies down to sleep at night. She feels     herself refluxing. She was previously on omeprazole but ran out of it and is     requesting a GI referral today. She is also requesting to be referred back to     ENT for her postnasal drip. Apparently there was an issue where she did not have    phone service and missed her appointment but would like to be seen there again.     She reports that her  postnasal drip is better when she used flonase and astelin     consistently.  She had low dose CT scan of the chest since her last office visit    that did not show any suspicious pulmonary nodules.             I reviewed the Review of Systems, medical, surgical and family history and agree    with those as entered.      Copies To:   Harsh Sánchez      Constitutional:  Denies: Fatigue, Fever, Weight gain, Weight loss, Chills,     Insomnia, Other      Respiratory/Breathing:  Complains of: Cough; Denies: Shortness of air, Wheezing,    Hemoptysis, Pleuritic pain, Other      Endocrine:  Denies: Polydipsia, Polyuria, Heat/cold intolerance, Abnorml     menstrual pattern, Diabetes, Other      Eyes:  Denies: Blurred vision, Vision Changes, Other      Ears, nose, mouth, throat:  Complains of: Throat pain, Hoarseness; Denies: Mouth    lesions, Thrush, Allergies/Hay Fever, Post Nasal Drip, Headaches, Recent Head     Injury, Nose Bleeding, Neck Stiffness, Thyroid Mass, Hearing Loss, Ear Fullness,    Dry Mouth, Nasal or Sinus Pain, Dry Lips, Nasal discharge, Nasal congestion,     Other      Cardiovascular:  Denies: Palpitations, Syncope, Claudication, Chest Pain, Wake     up Gasping for air, Leg Swelling, Irregular Heart Rate, Cyanosis, Dyspnea on     Exertion, Other      Gastrointestinal:  Denies: Nausea, Constipation, Diarrhea, Abdominal pain,     Vomiting, Difficulty Swallowing, Reflux/Heartburn, Dysphagia, Jaundice,     Bloating, Melena, Bloody stools, Other      Genitourinary:  Denies: Urinary frequency, Incontinence, Hematuria, Urgency, N    octuria, Dysuria, Testicular problems, Other      Musculoskeletal:  Denies: Joint Pain, Joint Stiffness, Joint Swelling, Myalgias,    Other      Hematologic/lymphatic:  DENIES: Lymphadenopathy, Bruising, Bleeding tendencies,     Other      Neurological:  Denies: Headache, Numbness, Weakness, Seizures, Other      Psychiatric:  Denies: Anxiety, Appropriate Effect,  Depression, Other      Sleep:  No: Excessive daytime sleep, Morning Headache?, Snoring, Insomnia?, Stop    breathing at sleep?, Other      Integumentary:  Denies: Rash, Dry skin, Skin Warm to Touch, Other      Immunologic/Allergic:  Denies: Latex allergy, Seasonal allergies, Asthma,     Urticaria, Eczema, Other      Immunization status:  No: Up to date            FAMILY/SOCIAL/MEDICAL HX      Stroke - Family Hx:  Grandparent      Diabetes - Family Hx:  Father, Grandparent      Cancer/Type - Family Hx:  Sister, Aunt      Is Father Still Living?:  No      Is Mother Still Living?:  Yes      Social History:  Tobacco Use; No Alcohol Use, No Recreational Drug use      Smoking status:  Current every day smoker (0.5 PPD x30 years )       Section:  Yes      Hysterectomy:  No      Anticoagulation Therapy:  No      Antibiotic Prophylaxis:  Yes      Medical History:  Yes: Asthma, Shortness Of Breath (BRONCHITIS); No: Blood     Disease, Chemotherapy/Cancer, Hemorrhoids/Rectal Prob, Reflux Disease, Sinus     Trouble      Psychiatric History      None            PREVENTION      Hx Influenza Vaccination:  Yes      Date Influenza Vaccine Given:  2019      Influenza Vaccine Declined:  No      2 or More Falls Past Year?:  No      Fall Past Year with Injury?:  No      Hx Pneumococcal Vaccination:  Yes      Encouraged to follow-up with:  PCP regarding preventative exams.      Chart initiated by      Lissa Ely MA            ALLERGIES/MEDICATIONS      Allergies:        Coded Allergies:             Naproxen (Verified  Allergy, Intermediate, 20)      Medications    Last Reconciled on 20 08:37 by MARKY MENJIVAR      Azelastine Hcl (Azelastine Nasal) 137 Mcg/0.137 Ml Spray.pump      1 PUFFS NARE EACH QDAY, #1 BOTTLE         Reported         10/28/19       Buck-Fluticasone (Fluticasone 50 mcg) 16 Gm Spray.susp      1 PUFFS NARE EACH QDAY, #1 BOTTLE 4 Refills         Reported         10/28/19       Meclizine Hcl  (Meclizine*) 25 Mg Tablet      25 MG PO BID PRN for VERTIGO for 10 Days, #20 TAB 0 Refills         Prov: Gillian Pena PA-C         10/28/19       Tiotropium Br/Olodaterol HCl (Stiolto Respimat Inhal Spray) 4 Gm Mist.inhal      2 PUFFS INH RTQDAY, #1 INH 5 Refills         Prov: Gillian Pena PA-C         9/19/19       Ibuprofen (Ibuprofen) 800 Mg Tablet      800 MG PO Q8H PRN for MILD PAIN (1-3)/TEMP OVER 101 for 30 Days, #90 TAB 0 Ref    ills         Prov: Gillian Pena PA-C         9/19/19       Nicotine Polacrilex (Nicorette) 2 Mg Gum      2 MG PO Q4-6H, #180 BOX 2 Refills         Prov: Gillian Pena PA-C         9/19/19       buPROPion SR (buPROPion SR) 150 Mg Tab.er.12h      150 MG PO BID for 30 Days, #60 TAB.ER.12H 3 Refills         Prov: Harsh Sánchez         4/4/19       Acetaminophen Es (Tylenol Extra Strength) 500 Mg Tablet      500 MG PO Q6H PRN for PAIN OR FEVER, #100 TAB 0 Refills         Reported         4/4/19       Cyclobenzaprine Hcl (Cyclobenzaprine*) 10 Mg Tablet      10 MG PO QDAY PRN for MUSCLE SPASMS, TAB 0 Refills         Reported         4/4/19       Cetirizine Hcl (CETIRIZINE HCL) 10 Mg Tablet      10 MG PO QDAY, #30 TAB 0 Refills         Reported         4/4/19       MDI-Albuterol (Proventil HFA) 60 Puffs/Inh Puffs      2 PUFF INH Q4HPRN, #1         Prov: HILL TOLEDO         5/17/12       Ascorbic Acid (Kanika C*) 500 Mg Tablet      500 MG PO QDAY         Reported         5/17/12      Current Medications      Current Medications Reviewed 2/13/20            EXAM      GEN-patient appears stated age resting comfortable in no acute distress      Eyes-PERRL,  conjunctiva are normal in appearance extraocular muscles are     intact, no scleral icterus      Lymphatic-no swollen or enlarged cervical nodes, or axillary node, or femoral     nodes, or supraclavicular nodes      Mouth normal dentition, no erythema no ulcerations oropharynx appears normal no     exudate no evidence of  postnasal drip, MP       Neck-there are no palpable supraclavicular or cervical adenopathy, thyroid is     normal in appearance no apparent nodules, there is no inspiratory or expiratory     stridor      Respiratory-Mildly decreased breath sounds throughout, no wheezes, rhonchi or     crackles, normal work of breathing noted.        Cardiovascular-the heart rate is normal and regular S1 and S2 present with no     murmur or extra heart sounds, there is no JVD or pedal edema present      GI-the abdomen is normal in appearance, bowel sounds present and normal in all     quadrants no hepatosplenomegaly or masses felt      Extremities-no clubbing is present, pulses present in all extremities, capillary    refill time is normal      Musculoskeletal-Normal strength in upper and lower extremities, inspection shows    no evidence of muscle atrophy      Skin-skin is normal in appearance it is warm and dry, no rashes present, no     evidence of cyanosis, palpation reveals no masses      Neurological-the patient is alert and oriented to time place and person, moves     all 4 extremities, normal gait, normal affect and mood, CN2-12 intact      Psych-normal judgment and insight is good, normal mood and affect, alert and     oriented to person, place, and time, and date      Vtials      Vitals:             Height 5 ft 2 in / 157.48 cm           Weight 207 lbs  / 93.276307 kg           BSA 1.94 m2           BMI 37.9 kg/m2           Temperature 98.5 F / 36.94 C - Oral           Pulse 97           Respirations 16           Blood Pressure 129/67 Sitting, Right Arm           Pulse Oximetry 96%, room air            REVIEW      Results Reviewed      [facility] Results Reviewed?:  Yes Prev Lab Results, Yes Prev Radiology Results, Yes     Previous [facility] Records            Assessment      Notes      New Medications      * Omeprazole (Omeprazole*) 40 MG CAPSULE: 40 MG PO QDAY #30      Renewed Medications      * Nicotine Polacrilex (Nicorette) 2  MG GUM: 2 MG PO Q4-6H #180      * Buck-Fluticasone (Fluticasone 50 mcg) 16 GM SPRAY.SUSP: 1 PUFFS NARE EACH QDAY       #1         Dx: SOB (shortness of breath) - R06.02      * AZELASTINE HCL (Azelastine Nasal) 137 MCG/0.137 ML SPRAY.PUMP: 1 PUFFS NARE       EACH QDAY #1         Dx: SOB (shortness of breath) - R06.02      Discontinued Medications      * Tiotropium Br/Olodaterol HCl (Stiolto Respimat Inhal Palm) 4 GM MIST.INHAL: 2      PUFFS INH RTQDAY #1         Dx: Bronchitis - J40      ASSESSMENT:      1. Likely early chronic obstructive pulmonary disease well controlled on PRN     albuterol.       2. Chronic bronchitis.       3. Ongoing tobacco abuse of cigarettes with 30 pack year history down to 2     cigarettes per day.       4. Postnasal drip.       5. Gastroesophageal reflux disease currently uncontrolled.       6.  Moderately decreased diffusion capacity on pulmonary function test with     normal echocardiogram likely secondary to smoking.              PLAN:      1. I have discussed with the patient that her low dose CT scan of the chest did     not show any suspicious nodules and she should continue with annual lung cancer     screening.        2.  The patient had an echocardiogram that was grossly normal and I discussed     her decreased diffusion capacity is likely due to her longstanding heavy smoking    history.       3. I counseled the patient on smoking cessation again for 5 minutes and     congratulated her on getting down to 2 cigarettes per day and encouraged her to     continue using Nicorette gum to quit.       4. She is currently well controlled with PRN albuterol, she is no longer using     Stiolto and uses albuterol only once a week.       5. She has uncontrolled gastroesophageal reflux disease so I will start her back    on omeprazole and refer her to GI at her request. She likely needs an upper     endoscopy to rule out esophagitis.       6. She is requesting to be referred back to ENT   Mor so we will place this     referral today. I instructed her to continue using flonase and astelin as     prescribed, and continue zyrtec.       7. She is up to date on flu and Pneumovax.       8. Follow up in 6-9 months, sooner if needed.            Patient Education      Tobacco Cessation Counseling:  for 3 - 10 minutes      Patient Education Provided:  COPD, How to use an Inhaler, Smoking Cessation      Time Spent:  > 50% /Coord Care            Electronically signed by BUBBA GUSTAFSON PA-C  02/24/2020 08:17       Disclaimer: Converted document may not contain table formatting or lab diagrams. Please see [employer] for the authenticated document.

## 2021-05-28 NOTE — PROGRESS NOTES
Patient: AUTUMN PEREZ     Acct: [id number]     Report: #[id number]  UNIT #: [id number]     : 1960    Encounter Date:2020  PRIMARY CARE: NONE,MD  ***Signed***  --------------------------------------------------------------------------------------------------------------------  TELEHEALTH NOTE      History of Present Illness            Chief Complaint: 3-4 week f/u            Autumn Perez is presenting for evaluation via Telehealth visit. Verbal     consent obtained before beginning visit.            Provider spent (12) minutes with the patient during telehealth visit.            The following staff were present during the visit: Elysia Russell MA, Gillian Pena PA-C            The patient is a 60 year old female known to me from previous office visits,     also a patient of Dr. Townsend. I had last seen her about three weeks ago for     an acute visit when she was having fevers, cough and dyspnea.  COVID-19 testing     was not available, but as she tested negative for flu A   symptomatology, I did recommend that she self quarantine for two weeks which she    reports compliance with. I also prescribed her Augmentin for possible     bronchitis. She felt that her symptoms did not improve quickly and she did     follow up at the respiratory clinic where she was prescribed Levaquin. She has     completed that and completed a prednisone burst that was prescribed by her ENT     provider, Dr. Juares.  She tells me she is feeling quite a bit better since her     last visit and is no longer having fever or chills. She denies hemoptysis or any    purulent sputum production. When she does cough up phlegm, it is clear. She     denies wheezing. She denies any increased dyspnea and has minimal cough now. She    is still having postnasal drip however despite taking Astelin, Flonase and     Zyrtec.              I have reviewed her ROS, medical history, medication and information as entered     by  the medical assistant.      Past Med History      Respiratory infection, copd, cough      Smoking- 30 pack a year      Flu- Current      Pneumonia- Current      Overview of Symptoms      Complains of Wheezing            Plan/Instructions      Ambulatory Assessment/Plan:        Notes      New Medications      * Tiotropium Br/Olodaterol HCl (Stiolto Respimat Inhal [place]) 4 GM MIST.INHAL: 2      PUFFS INH QDAY #1      * MONTELUKAST SODIUM (Singulair*) 10 MG TABLET: 10 MG PO HS #30      Discontinued Medications      * Amoxicillin/Clavulanic Acid 875/125 (Augmentin 875/125) 1 EACH TABLET: 875 MG       PO BID 7 Days #14      Plan/Instructions            * Plan Of Care: ()            * Chronic conditions reviewed and taken into consideration for today's treatment       plan.      * Patient instructed to seek medical attention urgently for new or worsening       symptoms.      * Patient was educated/instructed on their diagnosis, treatment and medications       prior to discharge from the clinic today.            ASSESSMENT:       1. Likely early COPD with recent acute exacerbation, clinically improving.      2.  Recent acute on chronic bronchitis, clinically improving.      3.  Ongoing postnasal drip despite Flonase and Astelin followed by ENT, Dr. Juares.      4. Cough, improving.      5. Dyspnea, resolving.      6. Recent fevers, resolved.      7. Ongoing tobacco abuse with cigarettes with 30 pack year history.      7. Moderately decreased diffusion capacity on PFTs, likely secondary to smoking.            PLAN:      1.  At this time i have discussed with patient current symptoms and plans and     recommendations going forward. As she has recently completed two courses of     antibiotics and steroids and is clinically improving I will hold off on any     additional antibiotics or steroids now. She should continue on Stiolto with two     puffs once daily and albuterol as needed.        2.  She is to continue on  omeprazole for gastroesophageal reflux disease.        3.  She should continue Flonase, Astelin and Zyrtec and I will also start her on     Singulair given her ongoing postnasal drip. She has also been instructed by Dr. Juares her ENT physician to follow up with him via phone if her postnasal drip     does not improve and she has verbalized understanding.        4.  I have encouraged her to continue social distancing, practice good hand     hygiene and have answered her numerous questions today regarding COVID-19.        5. I counseled the patient on smoking cessation for 5 minutes, offered nicotine     replacement therapy and pharmacotherapy, but she is already on Wellbutrin and     has nicotine gum which she is using. She attributes having a hard time quitting     smoking to her anxiety currently.        6. She is to call for any new or worsening symptoms, otherwise follow up in two     months with Dr. Sánchez, sooner if needed.      Codes:  Phone Eval 11-20 mi [phone]            Electronically signed by BUBBA GUSTAFSON PA-C  04/15/2020 13:41       Disclaimer: Converted document may not contain table formatting or lab diagrams. Please see [employer] for the authenticated document.

## 2021-05-28 NOTE — PROGRESS NOTES
Patient: MAHNAZ PEREZ     Acct: [id number]     Report: #[id number]  UNIT #: [id number]     : 1960    Encounter Date:2020  PRIMARY CARE: NONE,MD  ***Signed***  --------------------------------------------------------------------------------------------------------------------  Chief Complaint      Encounter Date      Mar 11, 2020            Primary Care Provider      SONIDO WHALEN            Referring Provider      SONIDO WHALEN            Patient Complaint      Patient is complaining of      respiratory infection            VITALS      Height 5 ft 2 in / 157.48 cm      Weight 207 lbs 0 oz / 93.968247 kg      BSA 1.94 m2      BMI 37.9 kg/m2      Temperature 98.5 F / 36.94 C - Oral      Pulse 90      Respirations 18      Blood Pressure 114/56 Sitting, Right Arm      Pulse Oximetry 98%, room air            HPI      The patient is a very pleasant 60 year old  female seen by me     for a previous office visit 1 month ago at which time she was doing fairly well.    She had come off her Stiolto and was doing well on PRN albuterol. She is now     here complaining of increased dyspnea, dry cough, fevers and chills with fevers     high as 101.1 at home. She took  Tylenol prior to her visit today and is     afebrile. She is a longstanding smoker with 30 pack year history, still smoking     2 cigarettes per day per her report. She was concerned that she may have the flu    because she has had multiple coworkers recently with the flu however she tested     negative for flu A and B in the office today. She has not had any travel     exposure, denies any known exposures with COVID-19 or novel coronavirus however     she has multiple sick coworkers. She is also complaining of sinus congestion and    pressure, blowing her nose frequently and postnasal drip that has been ongoing     for about 4 days.             I reviewed the Review of Systems, medical, surgical and family history and agree     with those as entered.      Copies To:   Harsh Sánchez      Constitutional:  Complains of: Fatigue; Denies: Fever, Weight gain, Weight loss,    Chills, Insomnia, Other      Respiratory/Breathing:  Complains of: Shortness of air, Wheezing, Cough; Denies:    Hemoptysis, Pleuritic pain, Other      Endocrine:  Denies: Polydipsia, Polyuria, Heat/cold intolerance, Abnorml     menstrual pattern, Diabetes, Other      Eyes:  Denies: Blurred vision, Vision Changes, Other      Ears, nose, mouth, throat:  Denies: Mouth lesions, Thrush, Throat pain,     Hoarseness, Allergies/Hay Fever, Post Nasal Drip, Headaches, Recent Head Injury,    Nose Bleeding, Neck Stiffness, Thyroid Mass, Hearing Loss, Ear Fullness, Dry     Mouth, Nasal or Sinus Pain, Dry Lips, Nasal discharge, Nasal congestion, Other      Cardiovascular:  Denies: Palpitations, Syncope, Claudication, Chest Pain, Wake     up Gasping for air, Leg Swelling, Irregular Heart Rate, Cyanosis, Dyspnea on     Exertion, Other      Gastrointestinal:  Denies: Nausea, Constipation, Diarrhea, Abdominal pain,     Vomiting, Difficulty Swallowing, Reflux/Heartburn, Dysphagia, Jaundice,     Bloating, Melena, Bloody stools, Other      Genitourinary:  Denies: Urinary frequency, Incontinence, Hematuria, Urgency,     Nocturia, Dysuria, Testicular problems, Other      Musculoskeletal:  Denies: Joint Pain, Joint Stiffness, Joint Swelling, Myalgias,    Other      Hematologic/lymphatic:  DENIES: Lymphadenopathy, Bruising, Bleeding tendencies,     Other      Neurological:  Denies: Headache, Numbness, Weakness, Seizures, Other      Psychiatric:  Denies: Anxiety, Appropriate Effect, Depression, Other      Sleep:  No: Excessive daytime sleep, Morning Headache?, Snoring, Insomnia?, Stop    breathing at sleep?, Other      Integumentary:  Denies: Rash, Dry skin, Skin Warm to Touch, Other      Immunologic/Allergic:  Denies: Latex allergy, Seasonal allergies, Asthma,     Urticaria,  Eczema, Other      Immunization status:  No: Up to date            FAMILY/SOCIAL/MEDICAL HX      Surgical History:  No: AAA Repair, Abdominal Surgery, Adenoids, Angioplasty,     Appendectomy, Back Surgery, Bladder Surgery, Bowel Surgery, Breast Surgery, CAB    G, Carotid Stenosis, Cholecystectomy, Ear Surgery, Eye Surgery, Head Surgery,     Hernia Surgery, Kidney Surgery, Nose Surgery, Oral Surgery, Orthopedic Surgery,     Prostatectomy, Rectal Surgery, Spinal Surgery, Testicular Surgery, Throat     Surgery, Tonsils, Valve Replacement, Vascular Surgery, Other Surgeries      Stroke - Family Hx:  Grandparent      Diabetes - Family Hx:  Father, Grandparent      Cancer/Type - Family Hx:  Sister, Aunt      Is Father Still Living?:  No      Is Mother Still Living?:  Yes      Social History:  Tobacco Use; No Alcohol Use, No Recreational Drug use      Smoking status:  Current every day smoker (0.5 PPD x30 years )       Section:  Yes      Hysterectomy:  No      Anticoagulation Therapy:  No      Antibiotic Prophylaxis:  Yes      Medical History:  Yes: Asthma, Shortness Of Breath (BRONCHITIS); No: Alcoholism,    Allergies, Anemia, Arthritis, Atrial Fibrillation, Blood Disease, Broken Bones,     Cataracts, Chemical Dependency, Chemotherapy/Cancer, Chronic Bronchitis/COPD,     Emphysema, Chronic Liver Disease, Colon Trouble, Colitis, Diverticulitis, Conge    stive Heart Failu, Deafness or Ringing Ears, Convulsions, Depression, Anxiety,     Bipolar Disorder, PTSD, Diabetes, Epilepsy, Seizures, Forgetfullness, Glaucoma,     Gall Stones, Gout, Head Injury, Heart Attack, Heart Murmur, GERD,     Hemorrhoids/Rectal Prob, Hepatitis, Hiatal Hernia, High Blood Pressure, High     Cholesterol, HIV (Do not ask - volu, Jaundice, Kidney or Bladder Disease, Kidney    Stones, Migrane Headaches, Mitral Valve Prolapse, Night sweats, Phlebitis,     Psychiatric Care, Reflux Disease, Rheumatic Fever, Sexually Transmitted Dis,     Sinus  Trouble, Skin Disease/Psoriais/Ecz, Stroke, Thyroid Problem, Tuberculosis     or Pos TB Te, Miscellaneous Medical/oth      Psychiatric History      none            PREVENTION      Hx Influenza Vaccination:  Yes      Date Influenza Vaccine Given:  Nov 1, 2019      Influenza Vaccine Declined:  No      2 or More Falls Past Year?:  No      Fall Past Year with Injury?:  No      Hx Pneumococcal Vaccination:  Yes      Encouraged to follow-up with:  PCP regarding preventative exams.      Chart initiated by      kasia chatterjee ma            ALLERGIES/MEDICATIONS      Allergies:        Coded Allergies:             NAPROXEN (Verified  Allergy, Intermediate, 2/13/20)      Medications    Last Reconciled on 3/11/20 16:54 by MARKY MENJIVAR      Omeprazole (Omeprazole*) 40 Mg Capsule      40 MG PO QDAY, #30 CAP 5 Refills         Prov: Gillian Pena PA-C         2/13/20       Azelastine Hcl (Azelastine Nasal) 137 Mcg/0.137 Ml Spray.pump      1 PUFFS NARE EACH QDAY, #1 BOTTLE         Prov: Gillian Pena PA-C         2/13/20       Buck-Fluticasone (Fluticasone 50 mcg) 16 Gm Spray.susp      1 PUFFS NARE EACH QDAY, #1 BOTTLE 4 Refills         Prov: Gillian Pena PA-C         2/13/20       Nicotine Polacrilex (Nicorette) 2 Mg Gum      2 MG PO Q4-6H, #180 BOX 2 Refills         Prov: Gillian Pena PA-C         2/13/20       Meclizine Hcl (Meclizine*) 25 Mg Tablet      25 MG PO BID PRN for VERTIGO for 10 Days, #20 TAB 0 Refills         Prov: Gillian Pena PA-C         10/28/19       Ibuprofen (Ibuprofen) 800 Mg Tablet      800 MG PO Q8H PRN for MILD PAIN (1-3)/TEMP OVER 101 for 30 Days, #90 TAB 0     Refills         Prov: Gillian Pena PA-C         9/19/19       buPROPion SR (buPROPion SR) 150 Mg Tab.er.12h      150 MG PO BID for 30 Days, #60 TAB.ER.12H 3 Refills         Prov: Harsh Sánchez         4/4/19       Acetaminophen Es (Tylenol Extra Strength) 500 Mg Tablet      500 MG PO Q6H PRN for PAIN OR FEVER, #100 TAB  0 Refills         Reported         4/4/19       Cyclobenzaprine Hcl (Cyclobenzaprine*) 10 Mg Tablet      10 MG PO QDAY PRN for MUSCLE SPASMS, TAB 0 Refills         Reported         4/4/19       Cetirizine Hcl (CETIRIZINE HCL) 10 Mg Tablet      10 MG PO QDAY, #30 TAB 0 Refills         Reported         4/4/19       MDI-Albuterol (Proventil HFA) 60 Puffs/Inh Puffs      2 PUFF INH Q4HPRN, #1         Prov: TRE,HILL         5/17/12       Ascorbic Acid (Kanika C*) 500 Mg Tablet      500 MG PO QDAY         Reported         5/17/12      Current Medications      Current Medications Reviewed 3/11/20            EXAM      GEN-patient appears stated age resting comfortable in no acute distress      Eyes-PERRL,  conjunctiva are normal in appearance extraocular muscles are     intact, no scleral icterus      Lymphatic-no swollen or enlarged cervical nodes, or axillary node, or femoral     nodes, or supraclavicular nodes      Mouth normal dentition, no erythema no ulcerations oropharynx appears normal no     exudate no evidence of postnasal drip, MP       Neck-there are no palpable supraclavicular or cervical adenopathy, thyroid is     normal in appearance no apparent nodules, there is no inspiratory or expiratory     stridor      Respiratory-Mildly decreased breath sounds throughout, no wheezes, rhonchi or     crackles, normal work of breathing noted.        Cardiovascular-the heart rate is normal and regular S1 and S2 present with no     murmur or extra heart sounds, there is no JVD or pedal edema present      GI-the abdomen is normal in appearance, bowel sounds present and normal in all     quadrants no hepatosplenomegaly or masses felt      Extremities-no clubbing is present, pulses present in all extremities, capillary    refill time is normal      Musculoskeletal-Normal strength in upper and lower extremities, inspection shows    no evidence of muscle atrophy      Skin-skin is normal in appearance it is warm and dry, no rashes  present, no     evidence of cyanosis, palpation reveals no masses      Neurological-the patient is alert and oriented to time place and person, moves     all 4 extremities, normal gait, normal affect and mood, CN2-12 intact      Psych-normal judgment and insight is good, normal mood and affect, alert and     oriented to person, place, and time, and date      Vtials      Vitals:             Height 5 ft 2 in / 157.48 cm           Weight 207 lbs 0 oz / 93.374622 kg           BSA 1.94 m2           BMI 37.9 kg/m2           Temperature 98.5 F / 36.94 C - Oral           Pulse 90           Respirations 18           Blood Pressure 114/56 Sitting, Right Arm           Pulse Oximetry 98%, room air            REVIEW      Results Reviewed      [facility] Results Reviewed?:  Yes Prev Lab Results, Yes Prev Radiology Results, Yes     Previous [facility] Records            Assessment      Notes      New Medications      * Amoxicillin/Clavulanic Acid 875/125 (Augmentin 875/125) 1 EACH TABLET: 875 MG       PO BID 7 Days #14      ASSESSMENT:      1. Likely early chronic obstructive pulmonary disease was well controlled on PRN    albuterol.       2. Acute on chronic bronchitis.       3. Cough.       4. Dyspnea.       5. Reported fever and chills at home currently afebrile.        6. Ongoing tobacco abuse of cigarettes with 30 pack year history down to 2     cigarettes per day.       7. Postnasal drip.       8. Gastroesophageal reflux disease currently uncontrolled.       9.  Moderately decreased diffusion capacity on pulmonary function test with no    rmal echocardiogram likely secondary to smoking.              PLAN:      1. I have discussed with the patient regarding her current symptoms and her     swabs for flu A and B were negative today however she is having symptoms     consistent with COVID-19. Given that she is afebrile in the office today I will     not be able to test her for this according to current state guidelines.       2. I will  treat the patient with Augmentin for 1 week for possible bronchitis or    upper respiratory or sinus infection. She should let us know if symptoms worsen     or do not improve.       3. I asked her to start back on Stiolto 2 puffs once daily and use albuterol as     needed.       4. Continue omeprazole.       5. Continue flonase, astelin and zyrtec as prescribed.       6. I already referred her back to GI and ENT at her last visit.       7. I discussed with the patient that I would recommend 2 week quarantine and she    verbalized understanding. I have given her a work note reflecting this.      8.  I specifically discussed with the patient that if her symptoms worsen or do     not improve she is to go to [facility] or the ER for any worsening dyspnea,     cough, wheezing or respiratory distress. The patient verbalized understanding.     If she goes to the ER she is to call there and report her symptoms first to see     if any protocols or precautions need to be followed.       9. Follow up in 3-4 weeks to reassess symptoms and call sooner if needed.            Patient Education      Time Spent:  > 50% /Coord Care            Electronically signed by BUBBA GUSTAFSON PA-C  03/12/2020 10:03       Disclaimer: Converted document may not contain table formatting or lab diagrams. Please see [employer] for the authenticated document.

## 2021-05-28 NOTE — PROGRESS NOTES
Patient: MAHNAZ PEREZ     Acct: [id number]     Report: #[id number]  UNIT #: [id number]     : 1960    Encounter Date:2019  PRIMARY CARE: NONE,MD  ***Signed***  --------------------------------------------------------------------------------------------------------------------  Chief Complaint      Encounter Date      Sep 19, 2019            Primary Care Provider      SONIDO WHALEN            Referring Provider      SONIDO WHALEN            Patient Complaint      Patient is complaining of      Patient here today for follow up, has been in ER multiple times, wants to     discuss recurrent lung infections            VITALS      Height 5 ft 2.00 in / 157.48 cm      Weight 207 lbs  / 93.727730 kg      BSA 1.94 m2      BMI 37.9 kg/m2      Temperature 98.2 F / 36.78 C - Oral      Pulse 77      Respirations 14      Blood Pressure 123/95 Sitting, Left Arm      Pulse Oximetry 97%, room air            HPI      The patient is a very pleasant 59 year old  female last seen by     Dr. Sánchez as a new patient in 2019, she has been lost to follow up since    then. At that time she was mainly complaining of chest pain.  He ordered a     connective tissue disease panel and all her blood work came back normal. He     referred her to a rheumatologist for work up but the rheumatologist did find a     diagnosis for her by the patient's report. The patient states she has had     recurrent bronchitis for several months, much worse this summer where she feels     like every 6 weeks she has increased dyspnea, coughing and wheezing. She was     seen in the ER 1 week ago and treated for an ear infection and bronchitis but is    not feeling any better. The patient states she still has increased dyspnea,     coughing and a lot of wheezing. She is still coughing up purulent yellow and     green sputum. Chest x-ray done a week ago did not show any acute infiltrate. She    is not on any maintenance  inhalers and has not been diagnosed with asthma or     chronic obstructive pulmonary disease before. She has a 30 pack year smoking     history and continues to smoke but only smokes less than half a pack per day     currently as she is sick.             I reviewed her Review of Systems, medical, surgical and family history and agree    with those as entered.      Copies To:   Harsh Sánchez      Constitutional:  Denies: Fatigue, Fever, Weight gain, Weight loss, Chills,     Insomnia, Other      Respiratory/Breathing:  Complains of: Shortness of air, Wheezing, Cough; Denies:    Hemoptysis, Pleuritic pain, Other      Endocrine:  Denies: Polydipsia, Polyuria, Heat/cold intolerance, Abnorml     menstrual pattern, Diabetes, Other      Eyes:  Denies: Blurred vision, Vision Changes, Other      Ears, nose, mouth, throat:  Complains of: Congestion; Denies: Dysphagia, Hearing    Changes, Nose Bleeding, Nasal Discharge, Throat pain, Tinnitus, Other      Cardiovascular:  Denies: Chest Pain, Exertional dyspnea, Peripheral Edema,     Palpitations, Syncope, Wake up Gasping for air, Orthopnea, Tachycardia, Other      Gastrointestinal:  Denies: Abdominal pain/cramping, Bloody stools, Constipation,    Diarrhea, Melena, Nausea, Vomiting, Other      Genitourinary:  Denies: Dysuria, Urinary frequency, Incontinence, Hematuria,     Urgency, Other      Musculoskeletal:  Denies: Joint Pain, Joint Stiffness, Joint Swelling, Myalgias,    Other      Hematologic/lymphatic:  DENIES: Lymphadenopathy, Bruising, Bleeding tendencies,     Other      Neurologic:  Denies: Headache, Numbness, Weakness, Seizures, Other      Psychiatric:  Denies: Anxiety, Appropriate Effect, Depression, Other      Sleep:  No: Excessive daytime sleep, Morning Headache?, Snoring, Insomnia?, Stop    breathing at sleep?, Other      Integumentary:  Denies: Rash, Dry skin, Skin Warm to Touch, Other            FAMILY/SOCIAL/MEDICAL HX      Stroke - Family Hx:   Grandparent      Diabetes - Family Hx:  Father, Grandparent      Cancer/Type - Family Hx:  Sister, Aunt      Is Father Still Living?:  No      Is Mother Still Living?:  Yes      Smoking status:  Current every day smoker (0.5 PPD x30 years )       Section:  Yes      Hysterectomy:  No      Anticoagulation Therapy:  No      Antibiotic Prophylaxis:  Yes      Medical History:  Yes: Asthma, Shortness Of Breath (BRONCHITIS); No: Blood     Disease, Chemotherapy/Cancer, Hemorrhoids/Rectal Prob, Reflux Disease      Psychiatric History      none            PREVENTION      Hx Influenza Vaccination:  Yes      Date Influenza Vaccine Given:  2018      Influenza Vaccine Declined:  No      2 or More Falls Past Year?:  No      Fall Past Year with Injury?:  No      Hx Pneumococcal Vaccination:  No      Encouraged to follow-up with:  PCP regarding preventative exams.      Chart initiated by      Tomeka Engle CMA            ALLERGIES/MEDICATIONS      Allergies:        Coded Allergies:             NAPROXEN (Verified  Allergy, Mild, 19)      Medications    Last Reconciled on 19 09:05 by MARKY MENJIVAR      buPROPion HCl (Zyban) 150 Mg Tab.er.12h      150 MG PO BID for 30 Days, #60 TAB.ER.12H 3 Refills         Prov: Harsh Sánchez         19       Acetaminophen Es (Tylenol Extra Strength) 500 Mg Tablet      500 MG PO Q6H PRN for PAIN OR FEVER, #100 TAB 0 Refills         Reported         19       Cyclobenzaprine Hcl (Cyclobenzaprine*) 10 Mg Tablet      10 MG PO QDAY PRN for MUSCLE SPASMS, TAB 0 Refills         Reported         19       Cetirizine Hcl (CETIRIZINE HCL) 10 Mg Tablet      10 MG PO QDAY, #30 TAB 0 Refills         Reported         19       MDI-Albuterol (Proventil HFA) 60 Puffs/Inh Puffs      2 PUFF INH Q4HPRN, #1         Prov: HILL TOLEDO         12       Ascorbic Acid (Kanika C*) 500 Mg Tablet      500 MG PO QDAY         Reported         12      Current Medications       Current Medications Reviewed 9/19/19            EXAM      GEN-patient appears stated age resting comfortable in no acute distress      Eyes-PERRL,  conjunctiva are normal in appearance extraocular muscles are intact    , no scleral icterus      Lymphatic-no swollen or enlarged cervical nodes, or axillary node, or femoral     nodes, or supraclavicular nodes      Mouth normal dentition, no erythema no ulcerations oropharynx appears normal no     exudate no evidence of postnasal drip, MP       Neck-there are no palpable supraclavicular or cervical adenopathy, thyroid is     normal in appearance no apparent nodules, there is no inspiratory or expiratory     stridor      Respiratory-Scattered expiratory wheezing, no rhonchi or crackles appreciated,     normal work of breathing noted.        Cardiovascular-the heart rate is normal and regular S1 and S2 present with no     murmur or extra heart sounds, there is no JVD or pedal edema present      GI-the abdomen is normal in appearance, bowel sounds present and normal in all     quadrants no hepatosplenomegaly or masses felt      Extremities-no clubbing is present, pulses present in all extremities, capillary    refill time is normal      Musculoskeletal-Normal strength in upper and lower extremities, inspection shows    no evidence of muscle atrophy      Skin-skin is normal in appearance it is warm and dry, no rashes present, no     evidence of cyanosis, palpation reveals no masses      Neurological-the patient is alert and oriented to time place and person, moves     all 4 extremities, normal gait, normal affect and mood, CN2-12 intact      Psych-normal judgment and insight is good, normal mood and affect, alert and     oriented to person, place, and time, and date      Vitals      Vitals:             Height 5 ft 2.00 in / 157.48 cm           Weight 207 lbs  / 93.490041 kg           BSA 1.94 m2           BMI 37.9 kg/m2           Temperature 98.2 F / 36.78 C - Oral            Pulse 77           Respirations 14           Blood Pressure 123/95 Sitting, Left Arm           Pulse Oximetry 97%, room air            REVIEW      Results Reviewed      PCCS Results Reviewed?:  Yes Prev Lab Results, Yes Prev Radiology Results, Yes     Previous Mecial Records            Assessment      Bronchitis - J40            Tobacco dependence due to cigarettes - F17.210            Notes      New Medications      * Nicotine Polacrilex (Nicorette) 2 MG GUM: 2 MG PO Q4-6H #180      * Ibuprofen 800 MG TABLET: 800 MG PO Q8H PRN MILD PAIN (1-3)/TEMP OVER 101 30       Days #90      * Tiotropium Br/Olodaterol HCl (Stiolto Respimat Inhal [place]) 4 GM MIST.INHAL: 2      PUFFS INH RTQDAY #1         Dx: Bronchitis - J40      * Doxycycline Hyclate (Doxycycline Hyclate*) 100 MG CAPSULE: 100 MG PO BID 7       Days #14      * predniSONE* 10 MG TABLET: 10 MG PO ASDIR #48         Instructions: 60mg x 3days, 40mg x 3days, 30mg x 3days, 20mg x 3days, 10mg x        3days      New Diagnostics      * Sputum Culture W/Gram Stain, As Soon As Possible         Dx: Bronchitis - J40      * Low Dose Chest CT, SCHEDULED PROCEDURE         Dx: Bronchitis - J40      * PFT-Comp, PrePost,DLCO,BodyBox, 6 WEEKS         Dx: Bronchitis - J40      * 6 Min Walk With Pulse Ox, 6 WEEKS         Dx: Bronchitis - J40      New Office Procedures      * Solu-Medrol 125 MG, As Soon As Possible         METHYLPRED SOD SUCC (Solu-Medrol) 125 MG VIAL: 125 MILLIGRAM INTRAMUSC Qty 1        VIAL         Dx: Bronchitis - J40      ASSESSMENT:      1. Suspected chronic obstructive pulmonary disease.       2. Acute on chronic bronchitis.       3. Cough, wheezing and dyspnea.       4. Left lower lobe pulmonary nodule stable since 2008.       5. Ongoing tobacco abuse of cigarettes with 30 pack year history.      6. Atypical chest pain longstanding for many years.       7. Medical noncompliance.       8. Obesity with BMI 37.9.            PLAN:      1. I have discussed with  the patient that she appears to be having acute     bronchitis and I will treat her with a course of doxycycline and a tapered     course of prednisone. We will give her an intramuscular shot of Solu Medrol 125     mg in the office today.       2. I will check a sputum culture so antibiotic therapy can be tailored as     appropriate. She seems to have failed treatment with Levaquin recently.       3. I told the patient that I am suspicious that she has chronic obstructive     pulmonary disease given her symptoms and longstanding smoking history. I will     obtain baseline pulmonary function test and 6 minute walk test.       4. She will qualify for low dose lung cancer screening in January 2020 making     this 1 year since her last CT scan of the chest. The lung nodule she had has     been stable for many years. She wishes to proceed with annual low dose lung     cancer screening. Shared decision making was used today.       5. The patient has an albuterol rescue inhaler to use as needed. I will start     her on a daily maintenance inhaler with Stiolto 2 puffs once daily.       6. I counseled the patient on smoking cessation for 4 minutes today. I offered     nicotine replacement therapy and  pharmacotherapy and she is willing to try     nicotine gum. I stressed with her that I am concerned that she will continue to     have worsening dyspnea and recurrent lung infections if she continues to smoke.     The patient verbalized understanding.       7. I have prescribed ibuprofen to use as needed for her atypical musculoskeletal    chest pain.        8. Vaccination status will be addressed at her next visit when she is not sick.           9. The patient already has a follow up appointment in 4-6 weeks with Dr. Sánchez    to go over her test results and see how she is doing. She is to call sooner if     needed.            Patient Education      Tobacco Cessation Counseling:  for 3 - 10 minutes      Patient Education  Provided:  Acute Bronchitis, How to use an Inhaler, Smoking     Cessation      Time Spent:  > 50% /Coord Care            Patient Education:        Bronchitis (Adult)                 Disclaimer: Converted document may not contain table formatting or lab diagrams. Please see [facility] for the authenticated document.

## 2021-06-03 ENCOUNTER — TELEMEDICINE CONVERTED (OUTPATIENT)
Dept: FAMILY MEDICINE CLINIC | Facility: CLINIC | Age: 61
End: 2021-06-03
Attending: NURSE PRACTITIONER

## 2021-06-05 NOTE — PROGRESS NOTES
Progress Note      Patient Name: Autumn Muñoz   Patient ID: [id number]   Sex: Female   YOB: 1960    Primary Care Provider: Jane MORGAN   Referring Provider: SUJATHA MORGAN    Visit Date: May 5, 2021    Provider: EDDIE Livingston   [place] [facility] [place]   Location Address: [address] [place] [address], [address]  [place], [place]  [id number]   Location Phone: [phone]          Chief Complaint  · follow up ER pneumonia       History Of Present Illness  Autumn Muñoz is a 61 year old /Black female who presents for evaluation and treatment of:      Patient is here today for a follow up from pneumonia. Pt states she has had a sinus infection for 4 days now. She complains of headaches and trouble sleeping. Pt states she is taking aspirin 6x a day for headaches. She took doxy for pneumonia-states she finished her round of doxy. She was going to have her COVID vaccine today but cancelled due to being sick today. She has a hx of chronic sinusitis. Admits she is having a lot of sinus drainage and terrible sinus headaches. She has an appt w/ENT on 5/22/21. She has taken Levaquin in the past for her sinusitis and reports it is the only thing that seems to clear it up. Prescribed Levaquin. Discussed side effects of the medication.    she has been to the ER multiple times over the last yr for chest pain symptoms but cardiac etiology has always been ruled out-she was dx w/fibromyalgia-states since starting Cymbalta her symptoms have greatly improved. She went to the ER last month w/complaints of SOA and muscle spasms-she thought she had pleurisy-states she was dx w/pneumonia. She felt she had pleurisy as well-states  methocarbamol helped with the muscle spasms. Admits she still has pain below her ribs-feels she still has pleurisy.  Admits she did have SOB with the episodes-states it felt like a bad case of pleurisy-she was not given an antibiotic-she did make an appt after  her ER visit and was seen in our office by Lyly Shook-she prescribed doxy. She ordered a COVID screen which was negative. Ordered repeat chest x-ray today.       Past Medical History  Disease Name Date Onset Notes   Allergic rhinitis --  --    Allergic rhinitis, chronic --  --    Arthritis --  --    Asthma --  --    Chronic Obstructive Pulmonary Disease --  --    GERD --  --    Headache --  --    High cholesterol --  --    Hyperlipemia --  --    Limb Pain --  --    Limb Swelling --  --    Myofascial muscle pain --  --    Recurrent sinus infections --  --    Jaden's edema of vocal folds --  --    Shortness Of Air --  --    Thyroid nodule --  --    Tinnitus, bilateral --  --    Tobacco abuse --  --    Voice hoarseness --  --          Past Surgical History  Procedure Name Date Notes   Ceasarian section --  --    Uterine fibroid embolization --  --          Medication List  Name Date Started Instructions   aspirin 325 mg oral tablet  take 1 tablet (325 mg) by oral route once daily   atorvastatin 20 mg oral tablet 09/18/2020 TAKE 1 TABLET BY MOUTH ONCE DAILY AT BEDTIME   azelastine 137 mcg (0.1 %) nasal aerosol,spray 05/05/2021 apply 2 sprays by nasal route 2 times a day for 90 days   Cymbalta 60 mg oral capsule,delayed release(DR/EC) 05/05/2021 take 1 capsule (60 mg) by oral route once daily for 90 days   diclofenac sodium 50 mg oral tablet,delayed release (DR/EC) 03/27/2020 take 1 tablet (50 mg) by oral route 2 times per day for 30 days   Flonase Allergy Relief 50 mcg/actuation nasal spray,suspension 05/05/2021 spray 1 spray (50 mcg) in each nostril by intranasal route once daily for 90 days   ibuprofen 200 mg oral tablet 04/17/2021 take 1 tablet (200 mg) by oral route every 6 hours as needed with food   Lipitor 20 mg oral tablet 05/05/2021 take 1 tablet (20 mg) by oral route once daily at bedtime for 90 days   meclizine 25 mg oral tablet 11/16/2020 take 1 tablet (25 mg) by oral route once daily as needed    Medrol (Kenny) 4 mg oral tablets,dose pack 04/16/2021 take by oral route as directed per package instructions   methocarbamol 500 mg oral tablet 04/17/2021 take 1 tablets (500 mg) by oral route 4 times per day prn   montelukast 10 mg oral tablet 05/29/2020 take 1 tablet (10 mg) by oral route once daily in the evening   Nicorette 4 mg buccal gum 05/20/2020 chew 1 piece of gum (4 mg) by oral route every 2 hours as needed and as directed   omeprazole 40 mg oral capsule,delayed release(DR/EC) 11/16/2020 take 1 capsule (40 mg) by oral route once daily before a meal   OneTouch Delica Lancets 33 gauge miscellaneous misc 01/28/2021 check blood sugar once day DX E11.9   OneTouch Verio IQ Meter miscellaneous kit 01/28/2021 check blood sugar once daily DX E11.9   OneTouch Verio test strips miscellaneous strip 01/28/2021 check blood sugar once daily DX E11.9   Tylenol Arthritis Pain 650 mg oral tablet extended release 05/05/2021 take 1 tablets (650mg) by oral route every 8 hours swallowing whole with water. Do not break, crush, dissolve and/or chew. for 30 days   Xyzal 5 mg oral tablet 05/05/2021 take 1 tablet (5 mg) by oral route once daily in the evening for 30 days         Allergy List  Allergen Name Date Reaction Notes   naproxen --  --  --    NSAIDS --  --  --        Allergies Reconciled  Family Medical History  Disease Name Relative/Age Notes   Diabetes, unspecified type  Paternal grandparent   Family history of colon cancer Grandmother (maternal)/30s   Grandmother (maternal)/30s   Family history of breast cancer Sister/20s   Sister/20s; Aunt; Uncle/70s   Family history of certain chronic disabling diseases; arthritis Father/   Father         Social History  Finding Status Start/Stop Quantity Notes   Alcohol Use --  --/-- --  01/07/2021 - 10/26/2020 - 10/15/2020 - 10/15/2020 - 07/31/2020 - 05/29/2020 - 05/20/2020 - rarely drinks, less than 1 drink per day   Claustophobic Unknown --/-- --  yes   Recreational Drug Use Never  "--/-- --  no   Single. --  --/-- --  --    Tobacco Current every day --/-- 1/2 pk/day Currently taking Chantix trying to quit   Unemployed. --  --/-- --  --          Immunizations  NameDate Admin Mfg Trade Name Lot Number Route Inj VIS Given VIS Publication   [id number]/01/2019 SKB Fluzone Quadrivalent  NE NE 05/20/2020    Comments:          Review of Systems  · Constitutional  o Denies  o : fever, fatigue, weight loss, weight gain  · HENT  o Admits  o : headaches, sinus pain, nasal discharge, postnasal drip  · Cardiovascular  o Denies  o : lower extremity edema, claudication, chest pressure, palpitations  · Respiratory  o Denies  o : shortness of breath, wheezing, cough, hemoptysis, dyspnea on exertion  · Gastrointestinal  o Denies  o : nausea, vomiting, diarrhea, constipation, abdominal pain      Vitals  Date Time BP Position Site L\R Cuff Size HR RR TEMP (F) WT  HT  BMI kg/m2 BSA m2 O2 Sat FR L/min FiO2        05/05/2021 08:53 /69 Sitting    86 - R   216lbs 0oz 5'  2\" 39.51 2.07 98 %  21%          Physical Examination  · Constitutional  o Appearance  o : alert, in no acute distress, well developed, well-nourished  · Ears, Nose, Mouth and Throat  o Ears  o : Ext. Ears: Normal shape, Non tender, EACs: Normal , TMs: Normal, Hearing: intact to conversational voice bilaterally  o Nose  o : No nasal discharge, Mucosa: normal, Septum: midline, Sinuses:tender  o Throat  o : Oropharynx: no inflmation or lesions, Tonsils: within normal limits  · Neck  o Inspection/Palpation  o : Supple, no masses or tenderness, no deformities, Trachea: Midline, ROM: with in normal limits, no neck stiffness, no lymphadenopathy  o Thyroid  o : no thyomegaly, no palpabale masses   · Respiratory  o Auscultation of Lungs  o : diminishedl breath sounds throughout, no wheeze, rhonchi, or crackles  · Cardiovascular  o Heart  o : Regular rate and rhythm, Normal S1,S2 , No cardiac murmers, No S3 or S4 gallop or rubs  · Skin and Subcutaneous " Tissue  o General Inspection  o : no rashes, normal skin color, warm and dry  o Digits and Nails  o : no clubbing, cyanosis, deformities or edema present, normal appearing nails  · Neurologic  o Mental Status Examination  o : alert and oriented to time, place, and person. Gait and Station: normal gait, able to stand without difficulty  · Psychiatric  o Judgement and Insight  o : judgment and insight intact  o Mood and Affect  o : normal mood and affect          Assessment  · Pneumonia     486/J18.9  · Sinusitis     473.9/J32.9      Plan  · Orders  o ACO-39: Current medications updated and reviewed (, 1159F) - - 05/05/2021  o Chest xray 2 views [facility] Preferred View (87358) - 486/J18.9 - 05/05/2021  · Medications  o levofloxacin 750 mg oral tablet   SIG: take 1 tablet (750 mg) by oral route once daily for 5 days   DISP: (5) Tablet with 0 refills  Prescribed on 05/05/2021     o azelastine 137 mcg (0.1 %) nasal aerosol,spray   SIG: apply 2 sprays by nasal route 2 times a day for 90 days   DISP: (3) Spray with 1 refills  Adjusted on 05/05/2021     o Cymbalta 60 mg oral capsule,delayed release(DR/EC)   SIG: take 1 capsule (60 mg) by oral route once daily for 90 days   DISP: (90) Capsule with 1 refills  Adjusted on 05/05/2021     o Flonase Allergy Relief 50 mcg/actuation nasal spray,suspension   SIG: spray 1 spray (50 mcg) in each nostril by intranasal route once daily for 90 days   DISP: (3) Spray with 1 refills  Adjusted on 05/05/2021     o Lipitor 20 mg oral tablet   SIG: take 1 tablet (20 mg) by oral route once daily at bedtime for 90 days   DISP: (90) Tablet with 1 refills  Adjusted on 05/05/2021     o Tylenol Arthritis Pain 650 mg oral tablet extended release   SIG: take 1 tablets (650mg) by oral route every 8 hours swallowing whole with water. Do not break, crush, dissolve and/or chew. for 30 days   DISP: (90) Tablet with 0 refills  Refilled on 05/05/2021     o Xyzal 5 mg oral tablet   SIG: take 1 tablet (5 mg)  by oral route once daily in the evening for 30 days   DISP: (30) Tablet with 0 refills  Refilled on 05/05/2021     o nitroglycerin 0.4 mg sublingual tablet, sublingual   SIG: place 1 tablet (0.4 mg) by buccal route at the first sign of an attack; no more than 3 tabs are recommended within a 15 minute period.   DISP: (1) Tablet with 0 refills  Discontinued on 05/05/2021     o cyclobenzaprine 10 mg oral tablet   SIG: take 1 tablet by oral route once a day (at bedtime) for 30 days   DISP: (30) tablets with 1 refills  Discontinued on 05/05/2021     o doxycycline hyclate 100 mg oral tablet   SIG: take 1 tablet (100 mg) by oral route 2 times per day for 10 days   DISP: (20) Tablet with 0 refills  Discontinued on 05/05/2021     · Instructions  o Patient was educated/instructed on their diagnosis, treatment and medications prior to discharge from the clinic today.  o Patient instructed to seek medical attention urgently for new or worsening symptoms.  o Call the office with any concerns or questions.  · Disposition  o Call or Return if symptoms worsen or persist.            Electronically Signed by: EDDIE Livingston -Author on May 5, 2021 01:13:31 PM

## 2021-06-05 NOTE — PROGRESS NOTES
Progress Note      Patient Name: Autumn Muñoz   Patient ID: [id number]   Sex: Female   YOB: 1960    Primary Care Provider: Jane MORGAN   Referring Provider: SUJATHA MORGAN    Visit Date: Alida 3, 2021    Provider: EDDIE Livingston   Location: [facility]   Location Address: [address] [place] [address], [address]  [place], [place]  [id number]   Location Phone: [phone]          Chief Complaint  · CONGESTION  · COUGH      History Of Present Illness  TELEHEALTH TELEPHONE VISIT  Autumn Muñoz is a 61 year old /Black female who is presenting for evaluation via telehealth telephone visit. Verbal consent obtained before beginning visit.   Provider spent 25 minutes with patient during telehealth visit.   The following staff were present during this visit: EDDIE LIVINGSTON   Past Medical History/Overview of Patient Symptoms  Autumn Muñoz is a 61 year old /Black female who presents for evaluation and treatment of:      Patient is here today w cc of congestion and cough.     Pt. says she finished her Levaquin 5/10/21 and started a Medrol samantha at home that she started and she still not better. States she has another day left of the prednisone. She has been seeing  (ENT) for the chronic sinusitis. States she is scheduled for MRI Brain next wk for sinuses and U/S thyroid to recheck the thyroid nodules. She will then have a follow up w/ on 6/26/21. he has been taking Nasocort, Zyrtec, xyzal, Benadryl but no relief. States the Flonase is no longer effective so she switched to Nasocort. States Azestalin dries her out to much. States the steroid is decreasing the inflammation. She feels the drainage is going into her chest. States she has mold in her apartment so she is wondering if her symptoms are induced by the mold. Prescribed Mucinex and encouraged to drink plenty of fluids. Instructed on use of netti pot. Discussed Allergy Consult  -scheduled today.       Past Medical History  Disease Name Date Onset Notes   Allergic rhinitis, chronic --  --    Arthritis --  --    Asthma --  --    Chronic Obstructive Pulmonary Disease --  --    GERD --  --    Headache --  --    High cholesterol --  --    Hyperlipemia --  --    Myofascial muscle pain --  --    Recurrent sinus infections --  --    Jaden's edema of vocal folds --  --    Shortness Of Air --  --    Thyroid nodule --  --    Tinnitus, bilateral --  --    Tobacco abuse --  --    Voice hoarseness --  --          Past Surgical History  Procedure Name Date Notes   Ceasarian section --  --    Uterine fibroid embolization --  --          Medication List  Name Date Started Instructions   aspirin 325 mg oral tablet  take 1 tablet (325 mg) by oral route once daily   atorvastatin 20 mg oral tablet 09/18/2020 TAKE 1 TABLET BY MOUTH ONCE DAILY AT BEDTIME   azelastine 137 mcg (0.1 %) nasal aerosol,spray 05/05/2021 apply 2 sprays by nasal route 2 times a day for 90 days   Cymbalta 60 mg oral capsule,delayed release(DR/EC) 05/05/2021 take 1 capsule (60 mg) by oral route once daily for 90 days   diclofenac sodium 50 mg oral tablet,delayed release (DR/EC) 05/24/2021 take 1 tablet (50 mg) by oral route 2 times per day for 14 days   Flonase Allergy Relief 50 mcg/actuation nasal spray,suspension 05/05/2021 spray 1 spray (50 mcg) in each nostril by intranasal route once daily for 90 days   ibuprofen 200 mg oral tablet 04/17/2021 take 1 tablet (200 mg) by oral route every 6 hours as needed with food   Lipitor 20 mg oral tablet 05/05/2021 take 1 tablet (20 mg) by oral route once daily at bedtime for 90 days   meclizine 25 mg oral tablet 11/16/2020 take 1 tablet (25 mg) by oral route once daily as needed   methocarbamol 500 mg oral tablet 04/17/2021 take 1 tablets (500 mg) by oral route 4 times per day prn   montelukast 10 mg oral tablet 05/29/2020 take 1 tablet (10 mg) by oral route once daily in the evening   Nicorette 4  mg buccal gum 05/20/2020 chew 1 piece of gum (4 mg) by oral route every 2 hours as needed and as directed   nicotine (polacrilex) 4 mg buccal gum 05/24/2021 chew 1 piece of gum (4 mg) by oral route every 1-2 hours as needed and as directed for 30 days   omeprazole 40 mg oral capsule,delayed release(DR/EC) 11/16/2020 take 1 capsule (40 mg) by oral route once daily before a meal   OneTouch Delica Lancets 33 gauge miscellaneous misc 01/28/2021 check blood sugar once day DX E11.9   OneTouch Verio IQ Meter miscellaneous kit 01/28/2021 check blood sugar once daily DX E11.9   OneTouch Verio test strips miscellaneous strip 01/28/2021 check blood sugar once daily DX E11.9         Allergy List  Allergen Name Date Reaction Notes   naproxen --  --  --    NSAIDS --  --  --          Family Medical History  Disease Name Relative/Age Notes   Diabetes, unspecified type  Paternal grandparent   Family history of colon cancer Grandmother (maternal)/30s   Grandmother (maternal)/30s   Family history of breast cancer Sister/20s   Sister/20s; Aunt; Uncle/70s   Family history of certain chronic disabling diseases; arthritis Father/   Father         Social History  Finding Status Start/Stop Quantity Notes   Alcohol Use --  --/-- --  06/03/2021 - 01/07/2021 - 10/26/2020 - 10/15/2020 - 10/15/2020 - 07/31/2020 - 05/29/2020 - 05/20/2020 - rarely drinks, less than 1 drink per day   Claustophobic Unknown --/-- --  yes   Recreational Drug Use Never --/-- --  no   Single. --  --/-- --  --    Tobacco Current every day --/-- 1/2 pk/day Currently taking Chantix trying to quit   Unemployed. --  --/-- --  --          Immunizations  NameDate Admin Mfg Trade Name Lot Number Route Inj VIS Given VIS Publication   [id number]/01/2019 SKB Fluzone Quadrivalent  NE NE 05/20/2020    Comments:          Review of Systems  · Constitutional  o Denies  o : fever, fatigue, weight loss, weight gain  · HENT  o Admits  o : headaches, sinus pain, nasal congestion, nasal  discharge, postnasal drip  · Cardiovascular  o Denies  o : lower extremity edema, claudication, chest pressure, palpitations  · Respiratory  o Denies  o : shortness of breath, wheezing, cough, hemoptysis, dyspnea on exertion  · Gastrointestinal  o Denies  o : nausea, vomiting, diarrhea, constipation, abdominal pain          Assessment  · Allergic rhinitis due to allergen     477.9/J30.9  · Cough     786.2/R05  · Chest congestion     786.9/R09.89  · Seasonal allergies     477.9/J30.2  · Chronic sinusitis     473.9/J32.9      Plan  · Orders  o Physician Telephone Evaluation, 21-30 minutes (40232) - - 06/03/2021  o ACO-39: Current medications updated and reviewed (1159F, ) - - 06/03/2021  o ALLERGY CONSULTATION (ALLEG) - 477.9/J30.9, 477.9/J30.2 - 06/03/2021  · Medications  o Mucinex 600 mg oral tablet extended release 12hr   SIG: take 1 tablet (600 mg) by oral route every 12 hours as needed for 30 days   DISP: (60) Tablet with 5 refills  Prescribed on 06/03/2021     o atorvastatin 20 mg oral tablet   SIG: TAKE 1 TABLET BY MOUTH ONCE DAILY AT BEDTIME   DISP: (90) Tablet with 1 refills  Adjusted on 06/03/2021     o omeprazole 40 mg oral capsule,delayed release(DR/EC)   SIG: take 1 capsule (40 mg) by oral route once daily before a meal   DISP: (90) Capsule with 1 refills  Adjusted on 06/03/2021     o Medications have been Reconciled  o Transition of Care or Provider Policy  · Instructions  o Plan Of Care:   o Take all medications as prescribed/directed.  o Call the office with any concerns or questions.  o 3 month follow up  o Electronically Identified Patient Education Materials Provided Electronically  · Disposition  o Call or Return if symptoms worsen or persist.            Electronically Signed by: EDDIE Livingston -Author on Alida 3, 2021 04:01:22 PM

## 2021-06-05 NOTE — PROGRESS NOTES
"   Progress Note      Patient Name: Autumn Muñoz   Patient ID: [id number]   Sex: Female   YOB: 1960    Primary Care Provider: Jane MORGAN   Referring Provider: SUJATHA MORGAN    Visit Date: May 24, 2021    Provider: Mark Juares MD   Location: [facility]   Location Address: [address] [place] [address], [address] [place] [address]  [id number]   Location Phone: [phone]          Chief Complaint     \"I am still having trouble with my sinuses.\"       History Of Present Illness     Autumn Muñoz is a 61 year old /Black female with past medical history significant for tobacco abuse, obesity, and TMJ who returns today for follow-up of headache secondary to myofascial pain/TMJ, hoarseness secondary to Jaden's edema, and tinnitus.  She was originally seen on 3/20/2019 please see that note for further details.  At that time, she reported frequent facial pressure and headache but denied any rhinorrhea or nasal congestion during those episodes.  Examination that day revealed significant  muscle tenderness to palpation she was placed on cyclobenzaprine.  We last spoke on the phone on 5/18/2020 at which time she was referred for Covid testing.      She returns today for follow-up.  She tells me that she has been experiencing daily headaches since her last visit.  They tend to affect her bilateral forehead and will radiate to her occiput.  The only way she obtains relief is by using warm compresses especially before bed.  She has been on multiple rounds of antibiotics without improvement.  She denies any photophobia, phonophobia, nausea, or vomiting during the headaches.  She continues to smoke 1/2 pack/day and remains hoarse.  She again has not undergone any imaging of her sinuses or brain.         Past Medical History  Allergic rhinitis, chronic; Arthritis; Asthma; Chronic Obstructive Pulmonary Disease; GERD; Headache; High cholesterol; Hyperlipemia; Myofascial " muscle pain; Recurrent sinus infections; Jaden's edema of vocal folds; Shortness Of Air; Thyroid nodule; Tinnitus, bilateral; Tobacco abuse; Voice hoarseness         Past Surgical History  Ceasarian section; Uterine fibroid embolization         Medication List  aspirin 325 mg oral tablet; atorvastatin 20 mg oral tablet; azelastine 137 mcg (0.1 %) nasal aerosol,spray; Cymbalta 60 mg oral capsule,delayed release(DR/EC); diclofenac sodium 50 mg oral tablet,delayed release (DR/EC); Flonase Allergy Relief 50 mcg/actuation nasal spray,suspension; ibuprofen 200 mg oral tablet; Lipitor 20 mg oral tablet; meclizine 25 mg oral tablet; methocarbamol 500 mg oral tablet; montelukast 10 mg oral tablet; Nicorette 4 mg buccal gum; omeprazole 40 mg oral capsule,delayed release(DR/EC); OneTouch Delica Lancets 33 gauge miscellaneous misc; OneTouch Verio IQ Meter miscellaneous kit; OneTouch Verio test strips miscellaneous strip         Allergy List  naproxen; NSAIDS         Family Medical History  Diabetes, unspecified type; Family history of colon cancer; Family history of breast cancer; Family history of certain chronic disabling diseases; arthritis         Social History  Alcohol Use; Claustophobic (Unknown); Recreational Drug Use (Never); Single.; Tobacco (Current every day); Unemployed.         Immunizations  Name Date Admin   Influenza 08/01/2019         Review of Systems  · Constitutional  o Admits  o : night sweats  o Denies  o : fever, weight loss  · Eyes  o Denies  o : discharge from eye, impaired vision  · HENT  o Admits  o : *See HPI  · Cardiovascular  o Admits  o : chest pain  o Denies  o : irregular heart beats  · Respiratory  o Denies  o : shortness of breath, wheezing, coughing up blood  · Gastrointestinal  o Denies  o : heartburn, reflux, vomiting blood  · Genitourinary  o Denies  o : frequency  · Integument  o Denies  o : rash, skin dryness  · Neurologic  o Admits  o : dizziness  o Denies  o : seizures, loss of  "balance, loss of consciousness  · Endocrine  o Denies  o : cold intolerance, heat intolerance  · Heme-Lymph  o Denies  o : easy bleeding, anemia      Vitals  Date Time BP Position Site L\R Cuff Size HR RR TEMP (F) WT  HT  BMI kg/m2 BSA m2 O2 Sat FR L/min FiO2 HC       05/24/2021 08:38 AM        96.9 215lbs 4oz 5'  2\" 39.37 2.07             Physical Examination  · Constitutional  o Appearance  o : well developed, well-nourished, alert and in no acute distress, voice clear and strong  · Head and Face  o Head  o :   § Inspection  § : no deformities or lesions  o Face  o :   § Inspection  § : No facial lesions; House-Brackmann I/VI bilaterally  § Palpation  § : TMJ crepitus with  muscle tenderness bilaterally  · Eyes  o Vision  o :   § Visual Fields  § : Extraocular movements are intact. No spontaneous or gaze-induced nystagmus.  o Conjunctivae  o : clear  o Sclerae  o : clear  o Pupils and Irises  o : pupils equal, round, and reactive to light.   · Ears, Nose, Mouth and Throat  o Ears  o :   § External Ears  § : appearance within normal limits, no lesions present  § Otoscopic Examination  § : tympanic membrane appearance within normal limits bilaterally without perforations, well-aerated middle ears  § Hearing  § : intact to conversational voice both ears  o Nose  o :   § External Nose  § : appearance normal  § Intranasal Exam  § : mucosa within normal limits, vestibules normal, no intranasal lesions present, septum midline, sinuses non tender to percussion  o Oral Cavity  o :   § Oral Mucosa  § : oral mucosa normal without pallor or cyanosis  § Lips  § : lip appearance normal  § Teeth  § : Partial, worn dentition for age  § Gums  § : gums pink, non-swollen, no bleeding present  § Tongue  § : tongue appearance normal; normal mobility  § Palate  § : hard palate normal, soft palate appearance normal with symmetric mobility  o Throat  o :   § Oropharynx  § : no inflammation or lesions present, tonsils within " normal limits  § Hypopharynx  § : Deferred secondary to gag reflex  § Larynx  § : Deferred secondary to gag reflex  · Neck  o Inspection/Palpation  o : normal appearance, no masses or tenderness, trachea midline; thyroid size normal, nontender, no nodules or masses present on palpation  · Respiratory  o Respiratory Effort  o : breathing unlabored  o Inspection of Chest  o : normal appearance, no retractions  · Cardiovascular  o Heart  o : regular rate and rhythm  · Lymphatic  o Neck  o : no lymphadenopathy present  o Supraclavicular Nodes  o : no lymphadenopathy present  o Preauricular Nodes  o : no lymphadenopathy present  · Skin and Subcutaneous Tissue  o General Inspection  o : Regarding face and neck - there are no rashes present, no lesions present, and no areas of discoloration  · Neurologic  o Cranial Nerves  o : cranial nerves II-XII are grossly intact bilaterally  o Gait and Station  o : normal gait, able to stand without diffculty  · Psychiatric  o Judgement and Insight  o : judgment and insight intact  o Mood and Affect  o : mood normal, affect appropriate          Assessment  · Tobacco abuse     305.1/Z72.0  · Chronic headache       Headache, unspecified     784.0/R51.9  Other chronic pain     784.0/G89.29  · Myofascial muscle pain     729.1/M79.18  · Jaden's edema of vocal folds     478.4/J38.1  · Thyroid enlargement     240.9/E04.9      Plan  · Orders  o Smoking cessation counseling, 3-10 minutes [facility] (31128) - 305.1/Z72.0 - 05/24/2021  o Ultrasound Thyroid. (88845) - 240.9/E04.9 - 05/24/2021  o MRI brain w/w/o contrast (88390) - 784.0/R51.9, 784.0/G89.29 - 05/24/2021  · Medications  o nicotine (polacrilex) 4 mg buccal gum   SIG: chew 1 piece of gum (4 mg) by oral route every 1-2 hours as needed and as directed for 30 days   DISP: (60) Gum with 3 refills  Prescribed on 05/24/2021     o diclofenac sodium 50 mg oral tablet,delayed release (DR/EC)   SIG: take 1 tablet (50 mg) by oral route 2 times per day  for 14 days   DISP: (28) Tablet with 3 refills  Refilled on 05/24/2021     o Medications have been Reconciled  o Transition of Care or Provider Policy  · Instructions  o Tobacco and smoking cessation counseling for more than 3 minutes was completed.  o Impressions and findings were discussed Ms. Muñoz at great length. Currently, she is seen for evaluation of chronic headache which she reports has been present most days since her last appointment in March 2019. This has not improved with treatment for sinus infections we discussed that given her findings today I am doubtful this is sinus related. She has significant  muscle tenderness and TMJ crepitus concerning for temporomandibular joint disorder. However, given the chronicity of her headache she would feel more comfortable undergoing MRI which is reasonable enough. She will be started on diclofenac and we discussed smoking cessation given her history of Jaden's edema. She declined follow-up laryngoscopic examination today. She will follow-up after her imaging or sooner if needed.            Electronically Signed by: Mark Juares MD -Author on May 24, 2021 11:11:42 AM

## 2021-06-09 ENCOUNTER — HOSPITAL ENCOUNTER (OUTPATIENT)
Dept: MRI IMAGING | Facility: HOSPITAL | Age: 61
Discharge: HOME | End: 2021-06-09

## 2021-06-09 ENCOUNTER — HOSPITAL ENCOUNTER (OUTPATIENT)
Dept: ULTRASOUND IMAGING | Facility: HOSPITAL | Age: 61
Discharge: HOME | End: 2021-06-09

## 2021-06-09 DIAGNOSIS — R51.9 CHRONIC DAILY HEADACHE: ICD-10-CM

## 2021-06-09 DIAGNOSIS — E04.9 THYROID ENLARGEMENT: ICD-10-CM

## 2021-06-09 PROCEDURE — 70551 MRI BRAIN STEM W/O DYE: CPT

## 2021-06-09 PROCEDURE — 76536 US EXAM OF HEAD AND NECK: CPT

## 2021-06-18 RX ORDER — OMEPRAZOLE 40 MG/1
CAPSULE, DELAYED RELEASE ORAL
COMMUNITY
Start: 2021-06-04 | End: 2021-11-10 | Stop reason: SDUPTHER

## 2021-06-18 RX ORDER — METHOCARBAMOL 500 MG/1
TABLET, FILM COATED ORAL
COMMUNITY
Start: 2021-04-18 | End: 2021-12-23 | Stop reason: SDUPTHER

## 2021-06-18 RX ORDER — IBUPROFEN 200 MG
TABLET ORAL
COMMUNITY
Start: 2021-04-17 | End: 2021-12-23

## 2021-06-18 RX ORDER — FLUTICASONE PROPIONATE 50 MCG
1 SPRAY, SUSPENSION (ML) NASAL DAILY
COMMUNITY
Start: 2021-05-05 | End: 2021-12-23

## 2021-06-18 RX ORDER — VARENICLINE TARTRATE 0.5 MG/1
TABLET, FILM COATED ORAL
COMMUNITY
End: 2021-12-23

## 2021-06-18 RX ORDER — LEVOCETIRIZINE DIHYDROCHLORIDE 5 MG/1
5 TABLET, FILM COATED ORAL EVERY EVENING
COMMUNITY
Start: 2021-05-05 | End: 2021-12-29

## 2021-06-18 RX ORDER — AZELASTINE HYDROCHLORIDE 137 UG/1
SPRAY, METERED NASAL
COMMUNITY
Start: 2021-05-05 | End: 2022-09-27

## 2021-06-18 RX ORDER — ASPIRIN 325 MG
TABLET ORAL
COMMUNITY

## 2021-06-18 RX ORDER — CETIRIZINE HYDROCHLORIDE 10 MG/1
10 TABLET ORAL DAILY
COMMUNITY
Start: 2021-03-24 | End: 2022-02-01 | Stop reason: SDUPTHER

## 2021-06-18 RX ORDER — DOXYCYCLINE HYCLATE 100 MG
100 TABLET ORAL 2 TIMES DAILY
COMMUNITY
Start: 2021-04-14 | End: 2021-06-21

## 2021-06-18 RX ORDER — DULOXETIN HYDROCHLORIDE 60 MG/1
60 CAPSULE, DELAYED RELEASE ORAL DAILY
COMMUNITY
Start: 2021-05-05 | End: 2021-11-10 | Stop reason: SDUPTHER

## 2021-06-18 RX ORDER — MONTELUKAST SODIUM 10 MG/1
10 TABLET ORAL EVERY EVENING
COMMUNITY
Start: 2021-04-14 | End: 2022-09-27 | Stop reason: SDUPTHER

## 2021-06-18 RX ORDER — ATORVASTATIN CALCIUM 20 MG/1
20 TABLET, FILM COATED ORAL
COMMUNITY
Start: 2021-05-05 | End: 2022-09-27

## 2021-06-21 ENCOUNTER — OFFICE VISIT (OUTPATIENT)
Dept: OTOLARYNGOLOGY | Facility: CLINIC | Age: 61
End: 2021-06-21

## 2021-06-21 VITALS — HEIGHT: 62 IN | BODY MASS INDEX: 38.83 KG/M2 | TEMPERATURE: 97.1 F | WEIGHT: 211 LBS

## 2021-06-21 DIAGNOSIS — E04.1 THYROID NODULE: ICD-10-CM

## 2021-06-21 DIAGNOSIS — J31.0 CHRONIC RHINITIS: ICD-10-CM

## 2021-06-21 DIAGNOSIS — M79.18 MYOFASCIAL MUSCLE PAIN: Primary | ICD-10-CM

## 2021-06-21 PROCEDURE — 99213 OFFICE O/P EST LOW 20 MIN: CPT | Performed by: OTOLARYNGOLOGY

## 2021-06-21 NOTE — PROGRESS NOTES
Patient Name: Autumn Muñoz   Visit Date: 06/21/2021   Patient ID: [id number]  Provider: Mark Juares MD    Sex: female  Location: [facility]   YOB: 1960  Location Address: [address] [place] [address], [address],?[id number]    Primary Care Provider Jane Engle, EDDIE  Location Phone: [phone]    Referring Provider: No ref. provider found        Chief Complaint  No chief complaint on file.    History of Present Illness  Autumn Muñoz is a 61 y.o. female who presents to [facility] today for follow-up of headaches secondary to temporomandibular joint disorder/myofascial pain and hoarseness secondary to Jaden's edema.  She was originally seen on 3/20/2019.  Please see that note for further details.  She was last seen on 5/24/2021 which time she reported daily headaches affecting her bilateral forehead and radiating to the occiput.  She is a 1/2 pack/day smoker and was prescribed nicotine gum.  She declined any follow-up laryngoscopic examination.  For evaluation of her chronic headache and MRI of the brain with and without contrast was ordered.    She returns today for follow-up.  MRI of the brain without contrast on 6/9/2021 was unremarkable.  The sinuses, middle ears, and mastoids were clear. Thyroid ultrasound on 6/9/2021 revealed a slightly enlarged thyroid with a right upper 0.6 cm hypoechoic nodule, right lower 0.4 cm calcified nodule, and a left upper 1.2 cm hypoechoic nodule.  All of these were graded TI-RADS 4.  She tells me that her headaches are doing much better she took a course of prednisone.  She did not take the diclofenac as she is experienced issues with NSAIDs in the past.  She does feel as though mold is a trigger for her headaches's her symptoms also improved after bleaching her house extensively.    Past Medical History:   Diagnosis Date   • Arthritis    • Asthma    • Chronic allergic rhinitis     • COPD (chronic obstructive pulmonary disease) (CMS/HCC)    • GERD (gastroesophageal reflux disease)    • Headache    • High cholesterol    • Hyperlipemia    • Limb pain    • Limb swelling    • Myofascial muscle pain    • Recurrent sinus infections    • Jaden's edema of vocal folds    • SOB (shortness of breath)    • Thyroid nodule    • Tinnitus, bilateral    • Tobacco abuse    • Voice hoarseness        Past Surgical History:   Procedure Laterality Date   •  SECTION     • UTERINE FIBROID EMBOLIZATION           Current Outpatient Medications:   •  aspirin 325 MG tablet, aspirin 325 mg oral tablet take 1 tablet (325 mg) by oral route once daily   Active, Disp: , Rfl:   •  Azelastine HCl 137 MCG/SPRAY solution, USE 2 SPRAY(S) IN EACH NOSTRIL TWICE DAILY, Disp: , Rfl:   •  diclofenac (VOLTAREN) 50 MG EC tablet, TAKE 1 TABLET BY MOUTH TWICE DAILY FOR 14 DAYS, Disp: , Rfl:   •  DULoxetine (CYMBALTA) 60 MG capsule, Take 60 mg by mouth Daily., Disp: , Rfl:   •  fluticasone (FLONASE) 50 MCG/ACT nasal spray, 1 spray by Each Nare route Daily., Disp: , Rfl:   •  ibuprofen (ADVIL,MOTRIN) 200 MG tablet, ibuprofen 200 mg oral tablet take 1 tablet (200 mg) by oral route every 6 hours as needed with food 2021  Active, Disp: , Rfl:   •  levocetirizine (XYZAL) 5 MG tablet, Take 5 mg by mouth Every Evening., Disp: , Rfl:   •  methocarbamol (ROBAXIN) 500 MG tablet, TAKE 1 TABLET BY MOUTH 4 TIMES DAILY AS NEEDED, Disp: , Rfl:   •  montelukast (SINGULAIR) 10 MG tablet, Take 10 mg by mouth Every Evening., Disp: , Rfl:   •  omeprazole (priLOSEC) 40 MG capsule, , Disp: , Rfl:   •  varenicline (Chantix) 0.5 MG tablet, Chantix 0.5 mg oral tablet take 1 tablet by oral route  BID   Suspended, Disp: , Rfl:   •  atorvastatin (LIPITOR) 20 MG tablet, Take 20 mg by mouth every night at bedtime., Disp: , Rfl:   •  cetirizine (zyrTEC) 10 MG tablet, Take 10 mg by mouth Daily. FOR 30 DAYS, Disp: , Rfl:      Allergies   Allergen Reactions  "  • Naproxen Palpitations and Unknown - Low Severity   • Nsaids Palpitations       Social History     Tobacco Use   • Smoking status: Current Every Day Smoker     Packs/day: 0.50   • Tobacco comment: currently taking chantix   Substance Use Topics   • Alcohol use: Defer     Comment: rarely    • Drug use: Defer        Objective     Vital Signs:   Temp 97.1 °F (36.2 °C) (Temporal)   Ht 157.5 cm (62\")   Wt 95.7 kg (211 lb)   BMI 38.59 kg/m²       Physical Exam    General: Well developed, well nourished patient of stated age in no acute distress. Voice is strong and clear.   Head: Normocephalic and atraumatic.   Face: No lesions. House-Brackmann I/VI bilaterally.  TMJ crepitus with  muscle tenderness to palpation.  Eyes: PERRLA, sclerae anicteric, no conjunctival injection. Extra ocular movements are intact and full. No nystagmus.   Ears: Auricles are normal in appearance. Bilateral external auditory canals are unremarkable. Bilateral tympanic membranes are clear and without effusion. Hearing normal to conversational voice.   Nose: External nose is normal in appearance. Bilateral nares are patent with normal appearing mucosa. Septum midline. Turbinates are unremarkable. No lesions.   Oral Cavity: Lips are normal in appearance. Oral mucosa is unremarkable. Gingiva is unremarkable.  Partial, worn dentition for age. Tongue is unremarkable with good movement. Hard palate is unremarkable.   Oropharynx: Soft palate is unremarkable with full movement. Uvula is unremarkable. Bilateral tonsils are unremarkable. Posterior oropharynx is unremarkable.    Larynx and hypopharynx: Deferred secondary to gag reflex.  Neck: Supple.  No mass.  Nontender to palpation.  Trachea midline. Thyroid normal size and without nodules to palpation.   Lymphatic: No lymphadenopathy upon palpation.  Respiratory: Clear to auscultation bilaterally, nonlabored respirations    Cardiovascular: RRR, no murmurs, rubs, or gallops,   Psychiatric: " Appropriate affect, cooperative   Neurologic: Oriented x 3, strength symmetric in all extremities, Cranial Nerves II-XII are grossly intact to confrontation   Skin: Warm and dry. No rashes.    Procedures     Result Review :               Assessment and Plan    Diagnoses and all orders for this visit:    1. Myofascial muscle pain (Primary)    2. Thyroid nodule  -     US Thyroid; Future    3. Chronic rhinitis    Impressions and findings were discussed with Mrs. Muñoz at great length.  We reviewed the results of her MRI and thyroid ultrasound and she was reassured there is no evidence of sinus disease or any concern for thyroid cancer.  I have recommended a repeat thyroid ultrasound in 1 year to follow-up her nodules.  She is scheduled to see an allergist to undergo testing and potential immunotherapy and she was encouraged to keep this appointment.  She was given ample time to ask questions, all of which were answered the best my ability.      Follow Up   Return in about 1 year (around 6/21/2022).  Patient was given instructions and counseling regarding her condition or for health maintenance advice. Please see specific information pulled into the AVS if appropriate.

## 2021-07-15 VITALS
HEART RATE: 86 BPM | OXYGEN SATURATION: 98 % | DIASTOLIC BLOOD PRESSURE: 69 MMHG | WEIGHT: 216 LBS | BODY MASS INDEX: 39.75 KG/M2 | HEIGHT: 62 IN | SYSTOLIC BLOOD PRESSURE: 143 MMHG

## 2021-07-15 VITALS — HEIGHT: 62 IN | WEIGHT: 215.25 LBS | TEMPERATURE: 96.9 F | BODY MASS INDEX: 39.61 KG/M2

## 2021-08-16 ENCOUNTER — OFFICE VISIT (OUTPATIENT)
Dept: OTOLARYNGOLOGY | Facility: CLINIC | Age: 61
End: 2021-08-16

## 2021-08-16 VITALS — HEIGHT: 62 IN | BODY MASS INDEX: 37.8 KG/M2 | TEMPERATURE: 97.5 F | WEIGHT: 205.4 LBS

## 2021-08-16 DIAGNOSIS — J01.41 ACUTE RECURRENT PANSINUSITIS: Primary | ICD-10-CM

## 2021-08-16 DIAGNOSIS — J31.0 CHRONIC RHINITIS: ICD-10-CM

## 2021-08-16 DIAGNOSIS — M79.18 MYOFASCIAL MUSCLE PAIN: ICD-10-CM

## 2021-08-16 PROCEDURE — 99214 OFFICE O/P EST MOD 30 MIN: CPT | Performed by: OTOLARYNGOLOGY

## 2021-08-16 RX ORDER — CIPROFLOXACIN 500 MG/1
500 TABLET, FILM COATED ORAL EVERY 12 HOURS SCHEDULED
Qty: 20 TABLET | Refills: 0 | Status: SHIPPED | OUTPATIENT
Start: 2021-08-16 | End: 2021-08-26

## 2021-08-16 RX ORDER — PREDNISONE 20 MG/1
40 TABLET ORAL DAILY
Qty: 10 TABLET | Refills: 0 | Status: SHIPPED | OUTPATIENT
Start: 2021-08-16 | End: 2021-08-21

## 2021-08-16 NOTE — PROGRESS NOTES
Patient Name: Autumn Muñoz   Visit Date: 08/16/2021   Patient ID: [id number]  Provider: Mark Juares MD    Sex: female  [place] [facility]   YOB: 1960  Location Address: [address] [place] [address], [address],?[place]?[id number]    Primary Care Provider Jnae Engle, EDDIE  Location Phone: [phone]    Referring Provider: No ref. provider found        Chief Complaint  Earache    History of Present Illness  Autumn Muñoz is a 61 y.o. 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  She was originally seen on 3/20/2019.  Please see that note for further details.  She was seen on 5/24/2021 which time she reported daily headaches affecting her bilateral forehead and radiating to the occiput.  She is a 1/2 pack/day smoker and was prescribed nicotine gum.  She declined any follow-up laryngoscopic examination.  For evaluation of her chronic headache and MRI of the brain with and without contrast was ordered. MRI of the brain without contrast on 6/9/2021 was unremarkable.  The sinuses, middle ears, and mastoids were clear. Thyroid ultrasound on 6/9/2021 revealed a slightly enlarged thyroid with a right upper 0.6 cm hypoechoic nodule, right lower 0.4 cm calcified nodule, and a left upper 1.2 cm hypoechoic nodule.  All of these were graded TI-RADS 4.      She returns today for follow-up having last been seen on 6/21/2021.  She tells me that over the past few weeks she has been experiencing left-sided ear, face, neck, and throat pain.  This pain is often sharp and will radiate through the vertex of her skull.  It has been associated with blood-tinged rhinorrhea on the left.  She also feels that her hoarseness is worsened.  She is working on quitting smoking and currently using Nicorette.  She did take a 10-day  course of cefdinir that had been previously prescribed to her son in 2019.  She denies any significant nasal congestion, cough, or fevers.    Scope: po     Past Medical History:   Diagnosis Date   • Arthritis    • Asthma    • Chronic allergic rhinitis    • COPD (chronic obstructive pulmonary disease) (CMS/HCC)    • GERD (gastroesophageal reflux disease)    • Headache    • High cholesterol    • Hyperlipemia    • Limb pain    • Limb swelling    • Myofascial muscle pain    • Recurrent sinus infections    • Jaden's edema of vocal folds    • SOB (shortness of breath)    • Thyroid nodule    • Tinnitus, bilateral    • Tobacco abuse    • Voice hoarseness        Past Surgical History:   Procedure Laterality Date   •  SECTION     • UTERINE FIBROID EMBOLIZATION           Current Outpatient Medications:   •  aspirin 325 MG tablet, aspirin 325 mg oral tablet take 1 tablet (325 mg) by oral route once daily   Active, Disp: , Rfl:   •  atorvastatin (LIPITOR) 20 MG tablet, Take 20 mg by mouth every night at bedtime., Disp: , Rfl:   •  Azelastine HCl 137 MCG/SPRAY solution, USE 2 SPRAY(S) IN EACH NOSTRIL TWICE DAILY, Disp: , Rfl:   •  cetirizine (zyrTEC) 10 MG tablet, Take 10 mg by mouth Daily. FOR 30 DAYS, Disp: , Rfl:   •  diclofenac (VOLTAREN) 50 MG EC tablet, TAKE 1 TABLET BY MOUTH TWICE DAILY FOR 14 DAYS, Disp: , Rfl:   •  DULoxetine (CYMBALTA) 60 MG capsule, Take 60 mg by mouth Daily., Disp: , Rfl:   •  fluticasone (FLONASE) 50 MCG/ACT nasal spray, 1 spray by Each Nare route Daily., Disp: , Rfl:   •  ibuprofen (ADVIL,MOTRIN) 200 MG tablet, ibuprofen 200 mg oral tablet take 1 tablet (200 mg) by oral route every 6 hours as needed with food 2021  Active, Disp: , Rfl:   •  levocetirizine (XYZAL) 5 MG tablet, Take 5 mg by mouth Every Evening., Disp: , Rfl:   •  methocarbamol (ROBAXIN) 500 MG tablet, TAKE 1 TABLET BY MOUTH 4 TIMES DAILY AS NEEDED, Disp: , Rfl:   •  montelukast (SINGULAIR) 10 MG tablet, Take 10 mg  "by mouth Every Evening., Disp: , Rfl:   •  omeprazole (priLOSEC) 40 MG capsule, , Disp: , Rfl:   •  varenicline (Chantix) 0.5 MG tablet, Chantix 0.5 mg oral tablet take 1 tablet by oral route  BID   Suspended, Disp: , Rfl:   •  ciprofloxacin (CIPRO) 500 MG tablet, Take 1 tablet by mouth Every 12 (Twelve) Hours for 10 days., Disp: 20 tablet, Rfl: 0  •  predniSONE (DELTASONE) 20 MG tablet, Take 2 tablets by mouth Daily for 5 days., Disp: 10 tablet, Rfl: 0     Allergies   Allergen Reactions   • Naproxen Palpitations and Unknown - Low Severity   • Nsaids Palpitations       Social History     Tobacco Use   • Smoking status: Current Every Day Smoker     Packs/day: 0.50   • Tobacco comment: currently taking chantix   Substance Use Topics   • Alcohol use: Defer     Comment: rarely    • Drug use: Defer        Objective     Vital Signs:   Temp 97.5 °F (36.4 °C) (Temporal)   Ht 157.5 cm (62\")   Wt 93.2 kg (205 lb 6.4 oz)   BMI 37.57 kg/m²       Physical Exam    General: Well developed, well nourished patient of stated age in no acute distress. Voice is strong and clear.   Head: Normocephalic and atraumatic.   Face: No lesions. House-Brackmann I/VI bilaterally.  TMJ crepitus without  muscle tenderness to palpation.  Eyes: PERRLA, sclerae anicteric, no conjunctival injection. Extra ocular movements are intact and full. No nystagmus.   Ears: Auricles are normal in appearance. Bilateral external auditory canals are unremarkable. Bilateral tympanic membranes are clear and without effusion. Hearing normal to conversational voice.   Nose: External nose is normal in appearance. Bilateral nares are patent with normal appearing mucosa. Septum midline. Turbinates are unremarkable. No lesions.   Oral Cavity: Lips are normal in appearance. Oral mucosa is unremarkable. Gingiva is unremarkable.  Partial, worn dentition for age. Tongue is unremarkable with good movement. Hard palate is unremarkable.   Oropharynx: Soft palate is " unremarkable with full movement. Uvula is unremarkable. Bilateral tonsils are unremarkable. Posterior oropharynx is unremarkable.    Larynx and hypopharynx: Deferred secondary to gag reflex.  Neck: Supple.  No mass.  Nontender to palpation.  Trachea midline. Thyroid normal size and without nodules to palpation.   Lymphatic: No lymphadenopathy upon palpation.  Respiratory: Clear to auscultation bilaterally, nonlabored respirations    Cardiovascular: RRR, no murmurs, rubs, or gallops,   Psychiatric: Appropriate affect, cooperative   Neurologic: Oriented x 3, strength symmetric in all extremities, Cranial Nerves II-XII are grossly intact to confrontation   Skin: Warm and dry. No rashes.    Procedures     Diagnostic nasal endoscopy:    Indications: Left-sided facial pain blood-tinged rhinorrhea in a patient with inability to visualize the middle meatus on anterior rhinoscopy.    Summary: Patient's bilateral nares were decongested and anesthetized with Afrin and lidocaine sprays respectively. After giving the medications ample time to take effect a 0° rigid endoscope was inserted in the bilateral nares revealing a midline nasal septum. The bilateral inferior turbinates were hypertrophied more on the left than the right.  The bilateral  middle turbinates were normal in appearance. The middle meati, olfactory clefts, and sphenoethmoidal recesses were without pus, polyps, or lesion bilaterally. The nasopharynx and bilateral eustachian tube orifices were without mass or lesion. The nasal mucosa was mildly edematous in appearance.  Evaluation of the larynx revealed stable  Jaden's edema and no evidence of mass or lesion throughout the hypopharynx, glottis, supraglottis, and oropharynx.  The patient tolerated the procedure well.  Result Review :            Assessment and Plan    Diagnoses and all orders for this visit:    1. Acute recurrent pansinusitis (Primary)  -     CT Sinus Without Contrast; Future  -     ciprofloxacin  (CIPRO) 500 MG tablet; Take 1 tablet by mouth Every 12 (Twelve) Hours for 10 days.  Dispense: 20 tablet; Refill: 0  -     predniSONE (DELTASONE) 20 MG tablet; Take 2 tablets by mouth Daily for 5 days.  Dispense: 10 tablet; Refill: 0    2. Myofascial muscle pain    3. Chronic rhinitis         Impressions and findings were discussed with Mrs. Muñoz at great length.  Currently, she has been experiencing increased left-sided ear, face, neck, and throat pain which radiates to her vertex over the last few weeks.  This has not responded to a course of cefdinir.  She is not tender to palpation in her muscles of mastication as she has been previously.  We discussed the differential including acute rhinosinusitis versus temporomandibular joint disorder versus headache disorder.  At this time, she will be treated with a course of ciprofloxacin and prednisone as this may represent partially treated acute sinusitis.  We will also pursue a CT scan of her sinuses the next time she is severely symptomatic as she is curious if there is anything else that can be done for her condition.  She was given ample time to ask questions, all of which were answered to her satisfaction.      Follow Up   No follow-ups on file.  Patient was given instructions and counseling regarding her condition or for health maintenance advice. Please see specific information pulled into the AVS if appropriate.

## 2021-09-20 ENCOUNTER — APPOINTMENT (OUTPATIENT)
Dept: CT IMAGING | Facility: HOSPITAL | Age: 61
End: 2021-09-20

## 2021-11-10 DIAGNOSIS — E53.8 B12 DEFICIENCY: ICD-10-CM

## 2021-11-10 DIAGNOSIS — E11.9 TYPE 2 DIABETES MELLITUS WITHOUT COMPLICATION, WITHOUT LONG-TERM CURRENT USE OF INSULIN (HCC): ICD-10-CM

## 2021-11-10 DIAGNOSIS — Z72.0 TOBACCO ABUSE: ICD-10-CM

## 2021-11-10 DIAGNOSIS — E55.9 VITAMIN D DEFICIENCY: ICD-10-CM

## 2021-11-10 DIAGNOSIS — E78.5 HYPERLIPIDEMIA, UNSPECIFIED HYPERLIPIDEMIA TYPE: ICD-10-CM

## 2021-11-10 DIAGNOSIS — K21.9 GASTROESOPHAGEAL REFLUX DISEASE, UNSPECIFIED WHETHER ESOPHAGITIS PRESENT: Primary | ICD-10-CM

## 2021-11-10 DIAGNOSIS — M19.90 ARTHRITIS: ICD-10-CM

## 2021-11-10 NOTE — TELEPHONE ENCOUNTER
Caller: Autumn Muñoz Pastor    Relationship: Self    Best call back number: [phone]    Requested Prescriptions:   Requested Prescriptions     Pending Prescriptions Disp Refills   • omeprazole (priLOSEC) 40 MG capsule     • DULoxetine (CYMBALTA) 60 MG capsule       Sig: Take 1 capsule by mouth Daily.      VITAMIN  D   CYRUS ARNOLD - DOES NOT WANT GENERIC BRAND     Pharmacy where request should be sent: [facility] [address] [place] [address] [phone] [facility] [phone]      Additional details provided by patient: PATIENT IS ASKING IF SHE COULD ALSO GET A PRESCRIPTION FOR ZINC.      Does the patient have less than a 3 day supply:  [x] Yes  [] No    Adriana KAM Rep   11/10/21 11:25 EST

## 2021-11-11 DIAGNOSIS — Z00.00 PREVENTATIVE HEALTH CARE: Primary | ICD-10-CM

## 2021-11-11 RX ORDER — OMEPRAZOLE 40 MG/1
40 CAPSULE, DELAYED RELEASE ORAL DAILY
Qty: 90 CAPSULE | Refills: 1 | Status: SHIPPED | OUTPATIENT
Start: 2021-11-11 | End: 2022-03-10 | Stop reason: SDUPTHER

## 2021-11-11 RX ORDER — DULOXETIN HYDROCHLORIDE 60 MG/1
60 CAPSULE, DELAYED RELEASE ORAL DAILY
Qty: 90 CAPSULE | Refills: 1 | Status: SHIPPED | OUTPATIENT
Start: 2021-11-11 | End: 2022-03-10 | Stop reason: SDUPTHER

## 2021-11-17 NOTE — TELEPHONE ENCOUNTER
Caller: Autumn Muñoz Pastor    Relationship: Self    Best call back number: [phone]     Requested Prescriptions:   Requested Prescriptions     Pending Prescriptions Disp Refills   • ibuprofen (ADVIL,MOTRIN) 200 MG tablet     • montelukast (SINGULAIR) 10 MG tablet       Sig: Take 1 tablet by mouth Every Evening.        Pharmacy where request should be sent: [facility] [address] [place] [address] [phone] [facility] [phone] FX     Additional details provided by patient: PATIENT ALSO NEEDS TYLENOL ARTHRITIS REFILLED    Does the patient have less than a 3 day supply:  [x] Yes  [] No    Adriana SIEGEL Rep   11/17/21 09:09 EST

## 2021-11-18 NOTE — TELEPHONE ENCOUNTER
Pt was scheduled for an appt today-do we know why she did not show-I tried to contact her on 3 occassions

## 2021-11-19 RX ORDER — IBUPROFEN 200 MG
TABLET ORAL
OUTPATIENT
Start: 2021-11-19

## 2021-11-19 RX ORDER — MONTELUKAST SODIUM 10 MG/1
10 TABLET ORAL EVERY EVENING
OUTPATIENT
Start: 2021-11-19

## 2021-12-23 ENCOUNTER — HOSPITAL ENCOUNTER (OUTPATIENT)
Dept: GENERAL RADIOLOGY | Facility: HOSPITAL | Age: 61
Discharge: HOME | End: 2021-12-23

## 2021-12-23 ENCOUNTER — OFFICE VISIT (OUTPATIENT)
Dept: FAMILY MEDICINE CLINIC | Facility: CLINIC | Age: 61
End: 2021-12-23

## 2021-12-23 VITALS
SYSTOLIC BLOOD PRESSURE: 132 MMHG | BODY MASS INDEX: 38.31 KG/M2 | HEIGHT: 62 IN | DIASTOLIC BLOOD PRESSURE: 82 MMHG | HEART RATE: 75 BPM | OXYGEN SATURATION: 100 % | WEIGHT: 208.2 LBS

## 2021-12-23 DIAGNOSIS — M25.552 LEFT HIP PAIN: ICD-10-CM

## 2021-12-23 DIAGNOSIS — M54.42 ACUTE LEFT-SIDED LOW BACK PAIN WITH LEFT-SIDED SCIATICA: Primary | ICD-10-CM

## 2021-12-23 DIAGNOSIS — M54.42 ACUTE LEFT-SIDED LOW BACK PAIN WITH LEFT-SIDED SCIATICA: ICD-10-CM

## 2021-12-23 PROBLEM — M79.18 MYOFASCIAL MUSCLE PAIN: Status: ACTIVE | Noted: 2021-12-23

## 2021-12-23 PROBLEM — Z72.0 TOBACCO ABUSE: Status: ACTIVE | Noted: 2021-12-23

## 2021-12-23 PROBLEM — H93.19 TINNITUS: Status: ACTIVE | Noted: 2021-12-23

## 2021-12-23 PROBLEM — E04.1 THYROID NODULE: Status: ACTIVE | Noted: 2021-12-23

## 2021-12-23 PROBLEM — J44.9 CHRONIC OBSTRUCTIVE PULMONARY DISEASE: Status: ACTIVE | Noted: 2021-12-23

## 2021-12-23 PROBLEM — M19.90 ARTHRITIS: Status: ACTIVE | Noted: 2021-12-23

## 2021-12-23 PROBLEM — J32.9 RECURRENT SINUS INFECTIONS: Status: ACTIVE | Noted: 2021-12-23

## 2021-12-23 PROBLEM — J38.1 REINKE'S EDEMA OF VOCAL FOLDS: Status: ACTIVE | Noted: 2021-12-23

## 2021-12-23 PROBLEM — R51.9 HEADACHE: Status: ACTIVE | Noted: 2021-12-23

## 2021-12-23 PROBLEM — J45.909 ASTHMA: Status: ACTIVE | Noted: 2021-12-23

## 2021-12-23 PROBLEM — E78.5 HYPERLIPEMIA: Status: ACTIVE | Noted: 2021-12-23

## 2021-12-23 PROCEDURE — 73502 X-RAY EXAM HIP UNI 2-3 VIEWS: CPT

## 2021-12-23 PROCEDURE — 99213 OFFICE O/P EST LOW 20 MIN: CPT | Performed by: NURSE PRACTITIONER

## 2021-12-23 PROCEDURE — 72110 X-RAY EXAM L-2 SPINE 4/>VWS: CPT

## 2021-12-23 RX ORDER — METHOCARBAMOL 500 MG/1
500 TABLET, FILM COATED ORAL 3 TIMES DAILY PRN
Qty: 60 TABLET | Refills: 0 | Status: SHIPPED | OUTPATIENT
Start: 2021-12-23 | End: 2022-03-10 | Stop reason: SDUPTHER

## 2021-12-23 NOTE — PROGRESS NOTES
"Chief Complaint  Hip Pain    Subjective          Autumn Muñoz presents to [facility]  History of Present Illness   Pt states that her left hip has gone worst since Thanksgiving and keeps getting worst everyday. Pt states that she having pain when laying down, standing for long periods of time, going down the steps. Pt states that she has brought cushion pads for when washing dishes and when she has a bowelment seems like she gets immediately relieve.  Pt states that she doesn't like taking pain medications but she has been using the Robaxin 500 mg( has had the same bottle for couple years). Pt having pain in lower back that radiates in her left hip down to her leg and groin area 10/10 pain level. Pt has been sleeping in her recliner.    She  has a past medical history of Arthritis, Asthma, Chronic allergic rhinitis, COPD (chronic obstructive pulmonary disease) ([facility]), GERD (gastroesophageal reflux disease), Headache, High cholesterol, Hyperlipemia, Limb pain, Limb swelling, Myofascial muscle pain, Recurrent sinus infections, Jaden's edema of vocal folds, SOB (shortness of breath), Thyroid nodule, Tinnitus, bilateral, Tobacco abuse, and Voice hoarseness.     Objective   Vital Signs:   /82 (BP Location: Right arm, Patient Position: Sitting, Cuff Size: Large Adult)   Pulse 75   Ht 157.5 cm (62\")   Wt 94.4 kg (208 lb 3.2 oz)   SpO2 100%   BMI 38.08 kg/m²     Physical Exam  Constitutional:       Appearance: Normal appearance.   Cardiovascular:      Rate and Rhythm: Normal rate and regular rhythm.      Pulses: Normal pulses.      Heart sounds: Normal heart sounds.   Pulmonary:      Effort: Pulmonary effort is normal.      Breath sounds: Normal breath sounds.   Musculoskeletal:      Lumbar back: Spasms and tenderness present. Decreased range of motion. Positive right straight leg raise test and positive left straight leg raise test.      Left hip: Tenderness and bony " tenderness present. Decreased range of motion. Decreased strength.   Skin:     General: Skin is warm and dry.   Neurological:      General: No focal deficit present.      Mental Status: She is alert and oriented to person, place, and time.   Psychiatric:         Mood and Affect: Mood normal.         Behavior: Behavior normal.        Result Review :            Past Surgical History:   Procedure Laterality Date   •  SECTION     • UTERINE FIBROID EMBOLIZATION        Family History   Problem Relation Age of Onset   • Arthritis Father    • Breast cancer Sister 20   • Colon cancer Maternal Grandmother 30   • Diabetes Other    • Breast cancer Other         Current Outpatient Medications:   •  aspirin 325 MG tablet, aspirin 325 mg oral tablet take 1 tablet (325 mg) by oral route once daily   Active, Disp: , Rfl:   •  atorvastatin (LIPITOR) 20 MG tablet, Take 20 mg by mouth every night at bedtime., Disp: , Rfl:   •  Azelastine HCl 137 MCG/SPRAY solution, USE 2 SPRAY(S) IN EACH NOSTRIL TWICE DAILY, Disp: , Rfl:   •  cetirizine (zyrTEC) 10 MG tablet, Take 10 mg by mouth Daily. FOR 30 DAYS, Disp: , Rfl:   •  diclofenac (VOLTAREN) 50 MG EC tablet, Take 1 tablet by mouth 2 (Two) Times a Day., Disp: 60 tablet, Rfl: 0  •  DULoxetine (CYMBALTA) 60 MG capsule, Take 1 capsule by mouth Daily., Disp: 90 capsule, Rfl: 1  •  levocetirizine (XYZAL) 5 MG tablet, Take 5 mg by mouth Every Evening., Disp: , Rfl:   •  methocarbamol (ROBAXIN) 500 MG tablet, Take 1 tablet by mouth 3 (Three) Times a Day As Needed for Muscle Spasms., Disp: 60 tablet, Rfl: 0  •  montelukast (SINGULAIR) 10 MG tablet, Take 10 mg by mouth Every Evening., Disp: , Rfl:   •  nicotine polacrilex (NICORETTE) 4 MG gum, Chew 1 each As Needed for Smoking Cessation. chew 1 piece of gum (4 mg) by oral route every 2 hours as needed and as directed, Disp: 1 each, Rfl: 2  •  omeprazole (priLOSEC) 40 MG capsule, Take 1 capsule by mouth Daily., Disp: 90 capsule, Rfl: 1  •   Zinc 40 MG tablet, Take 40 mg by mouth Daily., Disp: 90 each, Rfl: 1   Assessment and Plan    Diagnoses and all orders for this visit:    1. Acute left-sided low back pain with left-sided sciatica (Primary)  -     XR Spine Lumbar Complete 4+VW; Future  -     diclofenac (VOLTAREN) 50 MG EC tablet; Take 1 tablet by mouth 2 (Two) Times a Day.  Dispense: 60 tablet; Refill: 0  -     methocarbamol (ROBAXIN) 500 MG tablet; Take 1 tablet by mouth 3 (Three) Times a Day As Needed for Muscle Spasms.  Dispense: 60 tablet; Refill: 0    2. Left hip pain  -     XR Hip With or Without Pelvis 2 - 3 View Left; Future  -     diclofenac (VOLTAREN) 50 MG EC tablet; Take 1 tablet by mouth 2 (Two) Times a Day.  Dispense: 60 tablet; Refill: 0  -     methocarbamol (ROBAXIN) 500 MG tablet; Take 1 tablet by mouth 3 (Three) Times a Day As Needed for Muscle Spasms.  Dispense: 60 tablet; Refill: 0        Follow Up   Return if symptoms worsen or fail to improve.  Patient was given instructions and counseling regarding her condition or for health maintenance advice. Please see specific information pulled into the AVS if appropriate.     Autumn Muñoz  reports that she has been smoking cigarettes. She has been smoking about 0.50 packs per day. She has never used smokeless tobacco.. I have educated her on the risk of diseases from using tobacco products such as cancer, COPD and heart disease.     I advised her to quit and she is not willing to quit.    I spent 3  minutes counseling the patient.              EDDIE Barron

## 2021-12-27 ENCOUNTER — TELEPHONE (OUTPATIENT)
Dept: FAMILY MEDICINE CLINIC | Facility: CLINIC | Age: 61
End: 2021-12-27

## 2021-12-27 NOTE — TELEPHONE ENCOUNTER
----- Message from EDDIE Barron sent at 12/26/2021  7:32 PM EST -----  No acute finding   Psychiatric Medication Management - Peyton 7 y o  male MRN: [id number]    Reason for Visit:   Chief Complaint   Patient presents with    Eating Disorder    Anxiety    Behavior Issues     Subjective:  9-7 y/o  Male, domiciled with parents, brother (5 y/o), sister (10 y/o) in [facility], currently enrolled in 4th grade at [employer] (regular classroom, academically does well, about 5-6 close friends, no h/o school bullying), no significant PPH, no past psychiatric hospitalizations, no past suicide attempts, no h/o self-injurious behaviors, no h/o physical aggression, PMH significant for GERD, presents to [place] [address] outpatient clinic on referral from [facility] for concerns about eating, with mother reporting "he refuses to eat a lot of things, will not mix foods, will not try new foods, also he is very sensitive about things, getting upset easily  "     On problem-focused interview:  1   Eating d/o- Patient reports that he is eating a bit more, reports he likes eating pizza for lunch today  Mother reports that patient generally eats dinner, will grilled chicken and pasta  Mother reports that patient is trying to keep nutrition up for hockey        2   Anxiety/Mood- Patient reports his mood has been "good," denying feeling sadness or depression, denying anger or irritability  Patient reports that he is sleeping well, denying any trouble falling or staying asleep  He reports sleeping about 9 hours per night        3   ODD, r/o ADHD- Patient reports that the school year is going well  Mother reports that patient struggled with writing skills, mother reports that knows the right answers, has difficulty expressing self with written expression  Mother reports that he has trouble organizing his thoughts on paper  Patient reports that he is doing well with math and reading  Mother denies any behavioral problems in the classroom    Patient denies any trouble focusing or paying attention  Mother reports that patient rushes through things at times, can make careless errors at times  He reports having some friends, reports enjoy playing Sonny  Patient reports that play ice hockey  Patient reports his behavioral control is good on the hockey team   Patient reports that his behavior at home has been good  Mother reports that patient's behavior at home has been good, hard to get off the video game system at times  Mother reports patient could work on taking his time, working on his writing skills  Patient reports the therapy is going well  Medication and therapy helping with symptoms, academic stressors is main exacerbating factor  Review Of Systems:     Constitutional Negative   ENT Negative   Cardiovascular Negative   Respiratory Negative   Gastrointestinal Negative   Genitourinary Negative   Musculoskeletal Negative   Integumentary Negative   Neurological Negative   Endocrine Negative     Past Medical History:   Patient Active Problem List   Diagnosis    Chronic rhinitis    Eating disorder    Gastroesophageal reflux disease    Oppositional defiant disorder    Attention deficit hyperactivity disorder (ADHD), predominantly inattentive type       Allergies: No Known Allergies    Past Surgical History: No past surgical history on file  Past Psychiatric History:   No significant PPH, no past psychiatric hospitalizations, no past suicide attempts, no h/o self-injurious behaviors, no h/o physical aggression  Previously in outpatient therapy with [facility]      Past Med Trials: Strattera 18 mg daily (stomach upset), Intuniv 1 mg daily (helpful)     Family Psychiatric History: Denies      Social History:   Lives with parents, 2 younger siblings  Mother stays at home, father works as a physical therapist  Mother reports there is a gun in the home- patient does not have access      Substance Abuse History: None     Traumatic History: Denies any h/o physical or sexual abuse  The following portions of the patient's history were reviewed and updated as appropriate: allergies, current medications, past family history, past medical history, past social history, past surgical history and problem list     Objective:  Vitals:    12/18/18 1707   BP: 108/66   Pulse: 83     Height: 4' 4" (132 1 cm)   Weight (last 2 days)     Date/Time   Weight    12/18/18 1707  28 1 (62)              Mental status:  Appearance sitting comfortably in chair, dressed in casual clothing, cooperative with interview, fairly well related   Mood "good"   Affect Appears generally euthymic, stable, mood-congruent   Speech Normal rate, rhythm, and volume   Thought Processes Linear and goal directed   Associations intact associations   Hallucinations Denies any auditory or visual hallucinations   Thought Content No passive or active suicidal or homicidal ideation, intent, or plan  Orientation Oriented to person, place, time, and situation   Recent and Remote Memory grossly intact   Attention Span concentration intact   Intellect Appears to be of Average Intelligence   Insight Limited insight   Judgement judgment was intact   Muscle Strength Muscle strength and tone were normal   Language Within normal limits   Fund of Knowledge Age appropriate   Pain none     Assessment/Plan:       Diagnoses and all orders for this visit:    Attention deficit hyperactivity disorder (ADHD), predominantly inattentive type    Oppositional defiant disorder    Eating disorder          Diagnosis: 1   Unspecified eating d/o, r/o anxiety disorder, 2  ODD, 3  ADHD- predominantly inattentive type     Treatment Recommendations:     9-5 y/o  Male, domiciled with parents, brother (2 y/o), sister (10 y/o) in [facility], currently enrolled in 4th grade at [employer] (regular classroom, academically does well, about 5-6 close friends, no h/o school bullying), no significant PPH, no past psychiatric hospitalizations, no past suicide attempts, no h/o self-injurious behaviors, no h/o physical aggression, PMH significant for GERD, presents [facility] on referral from peds GI for concerns about eating, with mother reporting "he refuses to eat a lot of things, will not mix foods, will not try new foods, also he is very sensitive about things, getting upset easily  "     On assessment today, patient with continued improvements in eating with more diversified diet, improving behavioral control, doing okay academically but rushes through work at times and can make careless errors, in psychosocial context of some social difficulties with peers at times, involved with [employer], stable family unit   No current passive or active suicidal ideation, intent, or plan  Currently, patient is not an imminent risk to harm self or others and is appropriate for outpatient level care at this time      Plan:  1  Eating- continue to follow-up with peds GI for medical management  Continue to monitor diet     2  Mood/Anxiety- Will continue to monitor mood and anxiety symptoms  May resume individual psychotherapy to work on mood and anxiety if symptoms worsen     3  ADHD/ODD- Given good control of symptoms over past few months, will stop Intuniv 1 mg daily at this time   Continue to work on behavioral modification at home  4  F/u with this provider in 3 months       Risks, Benefits And Possible Side Effects Of Medications:  Risks, benefits, and possible side effects of medications explained to patient and family, they verbalize understanding

## 2021-12-29 RX ORDER — LEVOCETIRIZINE DIHYDROCHLORIDE 5 MG/1
TABLET, FILM COATED ORAL
Qty: 30 TABLET | Refills: 5 | Status: SHIPPED | OUTPATIENT
Start: 2021-12-29 | End: 2022-03-10

## 2022-02-01 ENCOUNTER — TELEMEDICINE (OUTPATIENT)
Dept: FAMILY MEDICINE CLINIC | Facility: CLINIC | Age: 62
End: 2022-02-01

## 2022-02-01 ENCOUNTER — TELEPHONE (OUTPATIENT)
Dept: FAMILY MEDICINE CLINIC | Facility: CLINIC | Age: 62
End: 2022-02-01

## 2022-02-01 DIAGNOSIS — M54.50 CHRONIC LOW BACK PAIN, UNSPECIFIED BACK PAIN LATERALITY, UNSPECIFIED WHETHER SCIATICA PRESENT: ICD-10-CM

## 2022-02-01 DIAGNOSIS — G89.29 CHRONIC LOW BACK PAIN, UNSPECIFIED BACK PAIN LATERALITY, UNSPECIFIED WHETHER SCIATICA PRESENT: ICD-10-CM

## 2022-02-01 DIAGNOSIS — Z00.00 PREVENTATIVE HEALTH CARE: ICD-10-CM

## 2022-02-01 DIAGNOSIS — J32.9 RECURRENT SINUS INFECTIONS: Primary | ICD-10-CM

## 2022-02-01 DIAGNOSIS — J30.2 SEASONAL ALLERGIES: ICD-10-CM

## 2022-02-01 DIAGNOSIS — R93.7 ABNORMAL X-RAY OF LUMBAR SPINE: ICD-10-CM

## 2022-02-01 PROCEDURE — 99213 OFFICE O/P EST LOW 20 MIN: CPT | Performed by: NURSE PRACTITIONER

## 2022-02-01 RX ORDER — CETIRIZINE HYDROCHLORIDE 10 MG/1
10 TABLET ORAL DAILY
Qty: 90 TABLET | Refills: 1 | Status: SHIPPED | OUTPATIENT
Start: 2022-02-01 | End: 2022-03-10 | Stop reason: SDUPTHER

## 2022-02-01 RX ORDER — AMOXICILLIN AND CLAVULANATE POTASSIUM 875; 125 MG/1; MG/1
1 TABLET, FILM COATED ORAL 2 TIMES DAILY
Qty: 20 TABLET | Refills: 0 | Status: SHIPPED | OUTPATIENT
Start: 2022-02-01 | End: 2022-02-11

## 2022-02-01 RX ORDER — METHYLPREDNISOLONE 4 MG/1
TABLET ORAL
Qty: 21 EACH | Refills: 0 | Status: SHIPPED | OUTPATIENT
Start: 2022-02-01 | End: 2022-09-27

## 2022-02-01 NOTE — TELEPHONE ENCOUNTER
Caller: Autumn Muñoz    Relationship to patient: Self    Best call back number: [phone]    Patient is needing: PATIENT CALLED STATING HER EAR IS INFLAMED AND HAS A SINUS MIGRAINE, SORE THROAT AND DRAINAGE. THE PATIENT STATED SHE HAS A REALLY BAD SINUS INFECTION AND WOULD LIKE TO KNOW IF SHE CAN GET PENICILLIN CALLED IN. THE PATIENT ALSO STATED IF SHE IS NEEDING AN APPT SHE DOES NOT HAVE ANY TRANSPORTATION TO COME IN AND HER ONLY OPTION WOULD BE A VIDEO VISIT. THE PATIENT WOULD LIKE A CALL BACK PLEASE ADVISE THANK YOU.         [facility] [address] [place] [address] 100 [facility] - [phone] [facility] [phone]   [phone]

## 2022-02-11 ENCOUNTER — HOSPITAL ENCOUNTER (OUTPATIENT)
Dept: CT IMAGING | Facility: HOSPITAL | Age: 62
Discharge: HOME | End: 2022-02-11
Admitting: OTOLARYNGOLOGY

## 2022-02-11 DIAGNOSIS — J01.41 ACUTE RECURRENT PANSINUSITIS: ICD-10-CM

## 2022-02-11 PROCEDURE — 70486 CT MAXILLOFACIAL W/O DYE: CPT

## 2022-02-16 ENCOUNTER — TELEPHONE (OUTPATIENT)
Dept: FAMILY MEDICINE CLINIC | Facility: CLINIC | Age: 62
End: 2022-02-16

## 2022-02-22 ENCOUNTER — APPOINTMENT (OUTPATIENT)
Dept: MRI IMAGING | Facility: HOSPITAL | Age: 62
End: 2022-02-22

## 2022-03-03 ENCOUNTER — APPOINTMENT (OUTPATIENT)
Dept: GENERAL RADIOLOGY | Facility: HOSPITAL | Age: 62
End: 2022-03-03

## 2022-03-03 ENCOUNTER — HOSPITAL ENCOUNTER (EMERGENCY)
Facility: HOSPITAL | Age: 62
Discharge: HOME | End: 2022-03-03
Attending: EMERGENCY MEDICINE | Admitting: EMERGENCY MEDICINE

## 2022-03-03 ENCOUNTER — APPOINTMENT (OUTPATIENT)
Dept: CT IMAGING | Facility: HOSPITAL | Age: 62
End: 2022-03-03

## 2022-03-03 VITALS
WEIGHT: 210.32 LBS | HEIGHT: 62 IN | SYSTOLIC BLOOD PRESSURE: 140 MMHG | OXYGEN SATURATION: 98 % | HEART RATE: 79 BPM | RESPIRATION RATE: 17 BRPM | BODY MASS INDEX: 38.7 KG/M2 | DIASTOLIC BLOOD PRESSURE: 73 MMHG | TEMPERATURE: 98.4 F

## 2022-03-03 DIAGNOSIS — M54.2 NECK PAIN ON LEFT SIDE: ICD-10-CM

## 2022-03-03 DIAGNOSIS — M43.6 TORTICOLLIS: Primary | ICD-10-CM

## 2022-03-03 PROCEDURE — 70450 CT HEAD/BRAIN W/O DYE: CPT

## 2022-03-03 PROCEDURE — 72050 X-RAY EXAM NECK SPINE 4/5VWS: CPT

## 2022-03-03 PROCEDURE — 99283 EMERGENCY DEPT VISIT LOW MDM: CPT

## 2022-03-03 RX ORDER — CYCLOBENZAPRINE HCL 10 MG
10 TABLET ORAL ONCE
Status: DISCONTINUED | OUTPATIENT
Start: 2022-03-03 | End: 2022-03-03

## 2022-03-03 RX ORDER — LIDOCAINE 50 MG/G
1 PATCH TOPICAL EVERY 24 HOURS
Qty: 30 EACH | Refills: 0 | Status: SHIPPED | OUTPATIENT
Start: 2022-03-03

## 2022-03-03 RX ORDER — DEXAMETHASONE SODIUM PHOSPHATE 10 MG/ML
10 INJECTION INTRAMUSCULAR; INTRAVENOUS ONCE
Status: DISCONTINUED | OUTPATIENT
Start: 2022-03-03 | End: 2022-03-03

## 2022-03-03 NOTE — ED PROVIDER NOTES
Subjective   Pt presents with headache and neck pain down into left shoulder for past few months and worse in past 3 weeks. Worse with movement of head and arm.  States it feels like her neck has a spasm.  Recently treated for sinus infection with prednisone and amoxil a few weeks ago and still is having some left ear pain and pain to left neck unsure if it is related. Denies dizziness, numbness or tingling, chest pain or shortness of breath. States she has been using icy hot with minimal relief and has muscle relaxer's at home that sometimes help.       History provided by:  Patient  Neck Pain  Pain location:  L side  Quality:  Shooting and stabbing  Pain radiates to:  L shoulder  Pain severity:  Moderate  Onset quality:  Gradual  Timing:  Intermittent  Progression:  Worsening  Chronicity:  Recurrent  Relieved by:  Muscle relaxants and position  Worsened by:  Position  Associated symptoms: headaches    Associated symptoms: no fever and no photophobia        Review of Systems   Constitutional: Negative for appetite change, chills, fatigue and fever.   Eyes: Negative.  Negative for photophobia.   Respiratory: Negative.    Gastrointestinal: Negative.    Endocrine: Negative.    Genitourinary: Negative.    Musculoskeletal: Positive for neck pain.   Skin: Negative.    Allergic/Immunologic: Negative.  Negative for immunocompromised state.   Neurological: Positive for headaches.   Hematological: Negative.    Psychiatric/Behavioral: Negative.    All other systems reviewed and are negative.      Past Medical History:   Diagnosis Date   • Arthritis    • Asthma    • Chronic allergic rhinitis    • COPD (chronic obstructive pulmonary disease) (HCC)    • GERD (gastroesophageal reflux disease)    • Headache    • High cholesterol    • Hyperlipemia    • Limb pain    • Limb swelling    • Myofascial muscle pain    • Recurrent sinus infections    • Jaden's edema of vocal folds    • SOB (shortness of breath)    • Thyroid nodule    •  Tinnitus, bilateral    • Tobacco abuse    • Voice hoarseness        Allergies   Allergen Reactions   • Naproxen Palpitations and Unknown - Low Severity   • Nsaids Palpitations       Past Surgical History:   Procedure Laterality Date   •  SECTION     • UTERINE FIBROID EMBOLIZATION         Family History   Problem Relation Age of Onset   • Arthritis Father    • Breast cancer Sister 20   • Colon cancer Maternal Grandmother 30   • Diabetes Other    • Breast cancer Other        Social History     Socioeconomic History   • Marital status: Single   Tobacco Use   • Smoking status: Current Every Day Smoker     Packs/day: 0.50     Types: Cigarettes   • Smokeless tobacco: Never Used   Substance and Sexual Activity   • Alcohol use: Not Currently     Comment: rarely    • Drug use: Never   • Sexual activity: Defer           Objective   Physical Exam  Vitals and nursing note reviewed.   Constitutional:       General: She is not in acute distress.     Appearance: Normal appearance. She is not toxic-appearing.   HENT:      Head: Normocephalic and atraumatic.      Right Ear: Tympanic membrane, ear canal and external ear normal.      Left Ear: Tympanic membrane, ear canal and external ear normal.      Nose: Nose normal.      Mouth/Throat:      Mouth: Mucous membranes are moist.      Pharynx: Oropharynx is clear.   Eyes:      General: No scleral icterus.     Extraocular Movements: Extraocular movements intact.      Pupils: Pupils are equal, round, and reactive to light.   Cardiovascular:      Rate and Rhythm: Normal rate and regular rhythm.      Pulses: Normal pulses.      Heart sounds: Normal heart sounds.   Pulmonary:      Effort: Pulmonary effort is normal. No respiratory distress.      Breath sounds: Normal breath sounds.   Musculoskeletal:         General: Normal range of motion.      Cervical back: Normal range of motion and neck supple. Tenderness (left sternocleidomastoid muscle) present.      Comments: Pain on  palpation of left neck that is worse with movement of left arm and with rotation of neck, patient states she can't drive right now due to not being able to turn neck to left in order to see other cars   Lymphadenopathy:      Cervical: No cervical adenopathy.   Skin:     General: Skin is warm and dry.   Neurological:      General: No focal deficit present.      Mental Status: She is alert and oriented to person, place, and time. Mental status is at baseline.      Comments: NIH 0   Psychiatric:         Mood and Affect: Mood is anxious.           ED Course  ED Course as of 03/03/22 1827   Thu Mar 03, 2022   1752 Declined medications. [BE]      ED Course User Index  [BE] Prudencio Tiwari PA          MDM  Number of Diagnoses or Management Options  Neck pain on left side  Torticollis  Diagnosis management comments: Pt is a 61 y.o. female that presents today with Patient presents with:  Headache  Neck Pain  Arm Pain       Work up today:  Lab Results (last 24 hours)     ** No results found for the last 24 hours. **      XR Spine Cervical Complete 4 or 5 View    Result Date: 3/3/2022  PROCEDURE: XR SPINE CERVICAL COMPLETE 4 OR 5 VW  COMPARISON: [facility], , C-SPINE >OR= 4V W/OBLIQUE, 3/19/2015, 15:18.  INDICATIONS: LEFT SIDE NECK PAIN, NO KNOWN INJURY  FINDINGS:  There is straightening of the cervical lordosis, unchanged.  Disc space narrowing at the C3-4, C4-5 and C5-6 levels has not significantly changed.  There is slight progression of uncovertebral joint spurring and anterior endplate spurring at these levels causing mild to moderate bony C5 and C6 neural foraminal narrowing.  No prevertebral soft tissue swelling.  Posterior elements are intact.  Facet arthropathy particularly on the left is suspected in the mid and lower cervical spine.       Multilevel degenerative changes as detailed above have progressed since 2015. No acute bony abnormality.      BASIL MCCLAIN DO       Electronically Signed and  Approved By: BASIL MCCLAIN DO on 3/03/2022 at 18:17             CT Head Without Contrast    Result Date: 3/3/2022  PROCEDURE: CT HEAD WO CONTRAST  COMPARISON:  [facility], CT, HEAD W/O CONTRAST, 1/17/2016, 8:23. INDICATIONS: headache  PROTOCOL:   Standard imaging protocol performed    RADIATION:   DLP: 954 mGy*cm   MA and/or KV was adjusted to minimize radiation dose.     TECHNIQUE: After obtaining the patient's consent, CT images were obtained without non-ionic intravenous contrast material.  FINDINGS:  There is no CT evidence of acute hemorrhage, infarct, mass, mass effect or midline shift. There is no hydrocephalus. The gray-white matter junctions are preserved. The mastoid air cells and visualized paranasal sinuses are clear. No skull fractures or calvarial lesions.        No acute intracranial abnormality.  Overall unremarkable head CT without contrast.     BASIL MCCLAIN DO       Electronically Signed and Approved By: BASIL MCCLAIN DO on 3/03/2022 at 18:00              @No orders to display       Pt presents with left sided neck pain that has pain going into left head and shoulder. Going on for a few months and worse within past 3 weeks. Work up today unremarkable. No CP/SOA. NIH 0. Pain worse with movement of shoulder/neck and head. States Robaxin helps. Declined any meds while here.      The patient will pursue further outpatient evaluation with the primary care physician or other designated or consulting physician as outlined in the discharge instructions. They are agreeable to this plan of care and follow-up instructions have been explained in detail. The patient has received these instructions in written format and have expressed an understanding of the discharge instructions. The patient is aware that any significant change in condition or worsening of symptoms should prompt an immediate return to this or the closest emergency department or call to 911.  Pt is otherwise non toxic and non  ill appearing and stable for d/c home.  Pt is in agreement with this.  All questions answered at bedside.          Amount and/or Complexity of Data Reviewed  Tests in the radiology section of CPT®: reviewed    Risk of Complications, Morbidity, and/or Mortality  Presenting problems: moderate  Diagnostic procedures: moderate  Management options: moderate    Patient Progress  Patient progress: stable      Final diagnoses:   Torticollis   Neck pain on left side       ED Disposition  ED Disposition     ED Disposition Condition Comment    Discharge Stable           Jane Engle, APRN  [address]  [address]  [place] [address]  [phone]               Medication List      New Prescriptions    lidocaine 5 %  Commonly known as: LIDODERM  Place 1 patch on the skin as directed by provider Daily. Remove & Discard patch within 12 hours or as directed by MD           Where to Get Your Medications      These medications were sent to [facility] [address] [place] [address] - [address] [place], [place] [address] [facility] - [phone]  - [phone]   [address] [place] [id number]    Phone: [phone]   · lidocaine 5 %          Prudencio Tiwari PA  03/03/22 9910

## 2022-03-10 ENCOUNTER — OFFICE VISIT (OUTPATIENT)
Dept: FAMILY MEDICINE CLINIC | Facility: CLINIC | Age: 62
End: 2022-03-10

## 2022-03-10 VITALS
WEIGHT: 209.4 LBS | DIASTOLIC BLOOD PRESSURE: 83 MMHG | OXYGEN SATURATION: 97 % | BODY MASS INDEX: 38.53 KG/M2 | SYSTOLIC BLOOD PRESSURE: 146 MMHG | HEIGHT: 62 IN | HEART RATE: 79 BPM

## 2022-03-10 DIAGNOSIS — M19.90 ARTHRITIS: ICD-10-CM

## 2022-03-10 DIAGNOSIS — M50.30 DDD (DEGENERATIVE DISC DISEASE), CERVICAL: ICD-10-CM

## 2022-03-10 DIAGNOSIS — G89.29 CHRONIC LEFT SHOULDER PAIN: ICD-10-CM

## 2022-03-10 DIAGNOSIS — J30.89 ENVIRONMENTAL AND SEASONAL ALLERGIES: ICD-10-CM

## 2022-03-10 DIAGNOSIS — K21.9 CHRONIC GERD: ICD-10-CM

## 2022-03-10 DIAGNOSIS — M75.102 TEAR OF LEFT ROTATOR CUFF, UNSPECIFIED TEAR EXTENT, UNSPECIFIED WHETHER TRAUMATIC: Primary | ICD-10-CM

## 2022-03-10 DIAGNOSIS — M25.512 CHRONIC LEFT SHOULDER PAIN: ICD-10-CM

## 2022-03-10 PROCEDURE — 99213 OFFICE O/P EST LOW 20 MIN: CPT | Performed by: NURSE PRACTITIONER

## 2022-03-10 RX ORDER — CETIRIZINE HYDROCHLORIDE 10 MG/1
10 TABLET ORAL DAILY
Qty: 90 TABLET | Refills: 1 | Status: SHIPPED | OUTPATIENT
Start: 2022-03-10 | End: 2022-08-04 | Stop reason: SDUPTHER

## 2022-03-10 RX ORDER — METHOCARBAMOL 500 MG/1
500 TABLET, FILM COATED ORAL 3 TIMES DAILY PRN
Qty: 60 TABLET | Refills: 0 | Status: SHIPPED | OUTPATIENT
Start: 2022-03-10 | End: 2022-09-19 | Stop reason: SDUPTHER

## 2022-03-10 RX ORDER — OMEPRAZOLE 40 MG/1
40 CAPSULE, DELAYED RELEASE ORAL DAILY
Qty: 90 CAPSULE | Refills: 1 | Status: SHIPPED | OUTPATIENT
Start: 2022-03-10 | End: 2022-09-19 | Stop reason: SDUPTHER

## 2022-03-10 RX ORDER — DULOXETIN HYDROCHLORIDE 60 MG/1
60 CAPSULE, DELAYED RELEASE ORAL DAILY
Qty: 90 CAPSULE | Refills: 1 | Status: SHIPPED | OUTPATIENT
Start: 2022-03-10 | End: 2022-09-27 | Stop reason: SDUPTHER

## 2022-03-10 NOTE — PROGRESS NOTES
Chief Complaint  Torticollis (ED f/u) and Neck Pain (Left side)    Subjective          Autumn Muñoz presents to [facility]  History of Present Illness  Admits acute shoulder pain x 2 wks. Denies injury. Pt states that the pain has gotten worse in the left shoulder feels like the pain has radiated from her neck to head and goes down her back around her clavicle. Pt states that during the night that her throat feels like its going to seize up. Pt states that she can't do anything like comb her hair or move a certain way.She has a hx of left partial thickness tear-her last MRI was in 2019 it showed :  1. Mild acromioclavicular osteoarthritis    2. Mild sub scapularis and mild to moderate supraspinatus tendinopathy    3. Moderate attenuation of the distal supraspinatus tendon consistent with a partial thickness tear    4. Intrasubstance tear along the anterior aspect of the infra spinatus extending very near to both     the bursal and articular surfaces    5. Mild acromioclavicular osteoarthritis    States in 2019 she had PT ordered but did not get to do it due to COVID. States the last 2 wks her pain has been severe. She has been using heating pad and muscle relaxers. States pain 10/10. She is allergic to NSAIDS and defers steroids. Refilled Robaxin.     Pt has been taking the Methocarbamol 500 mg and using the heat pain.     Reports if MRI abnormal she would like to see .    She was seein in ER on 3/3/22 for c/o h/a and neck pain that radiated down the left shoulder. She had an cervical xray that showed Multilevel degenerative changes that have progressed since 2015. No acute bony abnormality. Ordered MRI cervical spine. No imaging was done of the left shoulder -she did have an MRI in 2019-ordered repeat MRI shoulder.     She  has a past medical history of Arthritis, Asthma, Chronic allergic rhinitis, COPD (chronic obstructive pulmonary disease) (HCC), GERD  "(gastroesophageal reflux disease), Headache, High cholesterol, Hyperlipemia, Limb pain, Limb swelling, Myofascial muscle pain, Recurrent sinus infections, Jaden's edema of vocal folds, SOB (shortness of breath), Thyroid nodule, Tinnitus, bilateral, Tobacco abuse, and Voice hoarseness.     Objective   Vital Signs:   /83 (BP Location: Right arm, Patient Position: Sitting, Cuff Size: Adult)   Pulse 79   Ht 157.5 cm (62\")   Wt 95 kg (209 lb 6.4 oz)   SpO2 97%   BMI 38.30 kg/m²     Physical Exam  Constitutional:       General: She is in acute distress.      Appearance: Normal appearance.   Cardiovascular:      Rate and Rhythm: Normal rate and regular rhythm.      Pulses: Normal pulses.      Heart sounds: Normal heart sounds.   Pulmonary:      Effort: Pulmonary effort is normal.      Breath sounds: Normal breath sounds.   Musculoskeletal:      Left shoulder: Tenderness present. No swelling, deformity or effusion. Decreased range of motion. Normal strength.      Cervical back: Spasms, tenderness and bony tenderness present. No torticollis. Pain with movement present. Decreased range of motion.   Skin:     General: Skin is warm and dry.   Neurological:      General: No focal deficit present.      Mental Status: She is alert and oriented to person, place, and time.   Psychiatric:         Mood and Affect: Mood normal.         Behavior: Behavior normal.        Result Review :   The following data was reviewed by: EDDIE Barron on 03/10/2022:    Data reviewed: Radiologic studies MRI left shoulder          Past Surgical History:   Procedure Laterality Date   •  SECTION     • UTERINE FIBROID EMBOLIZATION        Family History   Problem Relation Age of Onset   • Arthritis Father    • Breast cancer Sister 20   • Colon cancer Maternal Grandmother 30   • Diabetes Other    • Breast cancer Other         Current Outpatient Medications:   •  cetirizine (zyrTEC) 10 MG tablet, Take 1 tablet by mouth Daily. " FOR 30 DAYS, Disp: 90 tablet, Rfl: 1  •  DULoxetine (CYMBALTA) 60 MG capsule, Take 1 capsule by mouth Daily., Disp: 90 capsule, Rfl: 1  •  methocarbamol (ROBAXIN) 500 MG tablet, Take 1 tablet by mouth 3 (Three) Times a Day As Needed for Muscle Spasms., Disp: 60 tablet, Rfl: 0  •  omeprazole (priLOSEC) 40 MG capsule, Take 1 capsule by mouth Daily., Disp: 90 capsule, Rfl: 1  •  aspirin 325 MG tablet, aspirin 325 mg oral tablet take 1 tablet (325 mg) by oral route once daily   Active, Disp: , Rfl:   •  atorvastatin (LIPITOR) 20 MG tablet, Take 20 mg by mouth every night at bedtime., Disp: , Rfl:   •  Azelastine HCl 137 MCG/SPRAY solution, USE 2 SPRAY(S) IN EACH NOSTRIL TWICE DAILY, Disp: , Rfl:   •  lidocaine (LIDODERM) 5 %, Place 1 patch on the skin as directed by provider Daily. Remove & Discard patch within 12 hours or as directed by MD, Disp: 30 each, Rfl: 0  •  methylPREDNISolone (MEDROL) 4 MG dose pack, Take as directed on package instructions., Disp: 21 each, Rfl: 0  •  montelukast (SINGULAIR) 10 MG tablet, Take 10 mg by mouth Every Evening., Disp: , Rfl:   •  nicotine polacrilex (NICORETTE) 4 MG gum, Chew 1 each As Needed for Smoking Cessation. chew 1 piece of gum (4 mg) by oral route every 2 hours as needed and as directed, Disp: 1 each, Rfl: 2  •  Zinc 15 MG capsule, Take 1 tablet by mouth Daily for 90 days., Disp: 90 each, Rfl: 0   Assessment and Plan    Diagnoses and all orders for this visit:    1. Tear of left rotator cuff, unspecified tear extent, unspecified whether traumatic (Primary)  -     Cancel: XR Shoulder 2+ View Left; Future  -     MRI Shoulder Left Without Contrast; Future    2. DDD (degenerative disc disease), cervical  -     MRI Cervical Spine Without Contrast; Future    3. Chronic left shoulder pain  -     methocarbamol (ROBAXIN) 500 MG tablet; Take 1 tablet by mouth 3 (Three) Times a Day As Needed for Muscle Spasms.  Dispense: 60 tablet; Refill: 0    4. Environmental and seasonal  allergies  Comments:  chronic seasonal allergies-she takes zyrtec for management of symptoms  Orders:  -     cetirizine (zyrTEC) 10 MG tablet; Take 1 tablet by mouth Daily. FOR 30 DAYS  Dispense: 90 tablet; Refill: 1    5. Arthritis  -     DULoxetine (CYMBALTA) 60 MG capsule; Take 1 capsule by mouth Daily.  Dispense: 90 capsule; Refill: 1    6. Chronic GERD  Comments:  stable chronic GERD symptoms with omeprazole  Orders:  -     omeprazole (priLOSEC) 40 MG capsule; Take 1 capsule by mouth Daily.  Dispense: 90 capsule; Refill: 1        Follow Up   Return if symptoms worsen or fail to improve.  Patient was given instructions and counseling regarding her condition or for health maintenance advice. Please see specific information pulled into the AVS if appropriate.                 EDDIE Barron    The patient is advised to continue current medications.

## 2022-03-10 NOTE — PROGRESS NOTES
Chief Complaint  Torticollis (ED f/u) and Neck Pain (Left side)    Subjective     {Problem List  Visit Diagnosis   Encounters  Notes  Medications  Labs  Result Review Imaging  Media :23}     Autumn Muñoz presents to [facility]  History of Present Illness  Admits shoulder pain x 2 wks. Pt states that the pain has gotten worse in the left shoulder feels like the pain has radiated from her neck to head and goes down her back around her clavicle. Pt states that during the night that her throat feels like its going to seize up. Pt states that she can't do anything like comb her hair or move a certain way.    Pt has been taking the Methocarbamol 500 mg and using the heat pain. Denies injury.     She has a hx of left partial thickness tear-her last MRI was in 2019 it showed 1. Mild acromioclavicular osteoarthritis    2. Mild sub scapularis and mild to moderate supraspinatus tendinopathy    3. Moderate attenuation of the distal supraspinatus tendon consistent with a partial thickness tear    4. Intrasubstance tear along the anterior aspect of the infra spinatus extending very near to both     the bursal and articular surfaces    5. Mild acromioclavicular osteoarthritis    States in 2019 she had PT ordered but did not get to do it due to COVID. States the last 2 wks her pain has been severe. She has been using heating pad and muscle relaxers. States pain 10/10. Refilled Robaxin.     She  has a past medical history of Arthritis, Asthma, Chronic allergic rhinitis, COPD (chronic obstructive pulmonary disease) ([facility]), GERD (gastroesophageal reflux disease), Headache, High cholesterol, Hyperlipemia, Limb pain, Limb swelling, Myofascial muscle pain, Recurrent sinus infections, Jaden's edema of vocal folds, SOB (shortness of breath), Thyroid nodule, Tinnitus, bilateral, Tobacco abuse, and Voice hoarseness.     Objective   Vital Signs:   /83 (BP Location: Right arm, Patient  "Position: Sitting, Cuff Size: Adult)   Pulse 79   Ht 157.5 cm (62\")   Wt 95 kg (209 lb 6.4 oz)   SpO2 97%   BMI 38.30 kg/m²     Physical Exam   Result Review :{Labs  Result Review  Imaging  Med Tab  Media  Procedures :23}   The following data was reviewed by: EDDIE Barron on 03/10/2022:    Data reviewed: Radiologic studies MRI left shoulder          Past Surgical History:   Procedure Laterality Date   •  SECTION     • UTERINE FIBROID EMBOLIZATION        Family History   Problem Relation Age of Onset   • Arthritis Father    • Breast cancer Sister 20   • Colon cancer Maternal Grandmother 30   • Diabetes Other    • Breast cancer Other         Current Outpatient Medications:   •  methocarbamol (ROBAXIN) 500 MG tablet, Take 1 tablet by mouth 3 (Three) Times a Day As Needed for Muscle Spasms., Disp: 60 tablet, Rfl: 0  •  aspirin 325 MG tablet, aspirin 325 mg oral tablet take 1 tablet (325 mg) by oral route once daily   Active, Disp: , Rfl:   •  atorvastatin (LIPITOR) 20 MG tablet, Take 20 mg by mouth every night at bedtime., Disp: , Rfl:   •  Azelastine HCl 137 MCG/SPRAY solution, USE 2 SPRAY(S) IN EACH NOSTRIL TWICE DAILY, Disp: , Rfl:   •  cetirizine (zyrTEC) 10 MG tablet, Take 1 tablet by mouth Daily. FOR 30 DAYS, Disp: 90 tablet, Rfl: 1  •  diclofenac (VOLTAREN) 50 MG EC tablet, Take 1 tablet by mouth 2 (Two) Times a Day., Disp: 60 tablet, Rfl: 0  •  DULoxetine (CYMBALTA) 60 MG capsule, Take 1 capsule by mouth Daily., Disp: 90 capsule, Rfl: 1  •  levocetirizine (XYZAL) 5 MG tablet, TAKE 1 TABLET BY MOUTH ONCE DAILY IN THE EVENING, Disp: 30 tablet, Rfl: 5  •  lidocaine (LIDODERM) 5 %, Place 1 patch on the skin as directed by provider Daily. Remove & Discard patch within 12 hours or as directed by MD, Disp: 30 each, Rfl: 0  •  methylPREDNISolone (MEDROL) 4 MG dose pack, Take as directed on package instructions., Disp: 21 each, Rfl: 0  •  montelukast (SINGULAIR) 10 MG tablet, Take 10 mg " by mouth Every Evening., Disp: , Rfl:   •  nicotine polacrilex (NICORETTE) 4 MG gum, Chew 1 each As Needed for Smoking Cessation. chew 1 piece of gum (4 mg) by oral route every 2 hours as needed and as directed, Disp: 1 each, Rfl: 2  •  omeprazole (priLOSEC) 40 MG capsule, Take 1 capsule by mouth Daily., Disp: 90 capsule, Rfl: 1  •  Zinc 15 MG capsule, Take 1 tablet by mouth Daily for 90 days., Disp: 90 each, Rfl: 0   Assessment and Plan {CC Problem List  Visit Diagnosis   ROS  Review (Popup)  Health Maintenance  Quality  BestPractice  Medications  SmartSets  SnapShot Encounters  Media :23}   Diagnoses and all orders for this visit:    1. Tear of left rotator cuff, unspecified tear extent, unspecified whether traumatic (Primary)  -     Cancel: XR Shoulder 2+ View Left; Future  -     MRI Shoulder Left Without Contrast; Future    2. DDD (degenerative disc disease), cervical  -     MRI Cervical Spine Without Contrast; Future    3. Chronic left shoulder pain  -     methocarbamol (ROBAXIN) 500 MG tablet; Take 1 tablet by mouth 3 (Three) Times a Day As Needed for Muscle Spasms.  Dispense: 60 tablet; Refill: 0        Follow Up {Instructions Charge Capture  Follow-up Communications :23}  Return if symptoms worsen or fail to improve.  Patient was given instructions and counseling regarding her condition or for health maintenance advice. Please see specific information pulled into the AVS if appropriate.                 EDDIE Barron    The patient is advised to continue current medications.

## 2022-03-16 ENCOUNTER — TELEPHONE (OUTPATIENT)
Dept: FAMILY MEDICINE CLINIC | Facility: CLINIC | Age: 62
End: 2022-03-16

## 2022-04-29 ENCOUNTER — APPOINTMENT (OUTPATIENT)
Dept: MRI IMAGING | Facility: HOSPITAL | Age: 62
End: 2022-04-29

## 2022-05-13 ENCOUNTER — APPOINTMENT (OUTPATIENT)
Dept: MRI IMAGING | Facility: HOSPITAL | Age: 62
End: 2022-05-13

## 2022-05-19 ENCOUNTER — APPOINTMENT (OUTPATIENT)
Dept: MRI IMAGING | Facility: HOSPITAL | Age: 62
End: 2022-05-19

## 2022-05-20 ENCOUNTER — APPOINTMENT (OUTPATIENT)
Dept: MRI IMAGING | Facility: HOSPITAL | Age: 62
End: 2022-05-20

## 2022-06-13 ENCOUNTER — APPOINTMENT (OUTPATIENT)
Dept: MRI IMAGING | Facility: HOSPITAL | Age: 62
End: 2022-06-13

## 2022-06-17 ENCOUNTER — HOSPITAL ENCOUNTER (OUTPATIENT)
Dept: ULTRASOUND IMAGING | Facility: HOSPITAL | Age: 62
Discharge: HOME | End: 2022-06-17
Admitting: OTOLARYNGOLOGY

## 2022-06-17 DIAGNOSIS — E04.1 THYROID NODULE: ICD-10-CM

## 2022-06-17 PROCEDURE — 76536 US EXAM OF HEAD AND NECK: CPT

## 2022-06-22 ENCOUNTER — OFFICE VISIT (OUTPATIENT)
Dept: OTOLARYNGOLOGY | Facility: CLINIC | Age: 62
End: 2022-06-22

## 2022-06-22 VITALS — WEIGHT: 204.4 LBS | HEIGHT: 62 IN | TEMPERATURE: 95.7 F | BODY MASS INDEX: 37.61 KG/M2

## 2022-06-22 DIAGNOSIS — G43.809 VESTIBULAR MIGRAINE: ICD-10-CM

## 2022-06-22 DIAGNOSIS — E04.1 THYROID NODULE: Primary | ICD-10-CM

## 2022-06-22 DIAGNOSIS — J31.0 CHRONIC RHINITIS: ICD-10-CM

## 2022-06-22 DIAGNOSIS — J38.1 REINKE'S EDEMA OF VOCAL FOLDS: ICD-10-CM

## 2022-06-22 PROCEDURE — 99214 OFFICE O/P EST MOD 30 MIN: CPT | Performed by: OTOLARYNGOLOGY

## 2022-06-22 NOTE — PROGRESS NOTES
Patient Name: Autumn Muñoz   Visit Date: 06/22/2022   Patient ID: [id number]  Provider: Mark Juares MD    Sex: female  Location: [facility]   YOB: 1960  Location Address: [address] [place] [address], [address],?[place]?[address]    Primary Care Provider Jane Engle, EDDIE  Location Phone: [phone]    Referring Provider: No ref. provider found        Chief Complaint  1 year follow up w/ thyroid US results    Earache       Autumn Muñoz is a 62 y.o. 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  She was originally seen on 3/20/2019.  Please see that note for further details.  She was seen on 5/24/2021 which time she reported daily headaches affecting her bilateral forehead and radiating to the occiput.  She is a 1/2 pack/day smoker and was prescribed nicotine gum.  She declined any follow-up laryngoscopic examination.  For evaluation of her chronic headache and MRI of the brain with and without contrast was ordered. MRI of the brain without contrast on 6/9/2021 was unremarkable.  The sinuses, middle ears, and mastoids were clear. Thyroid ultrasound on 6/9/2021 revealed a slightly enlarged thyroid with a right upper 0.6 cm hypoechoic nodule, right lower 0.4 cm calcified nodule, and a left upper 1.2 cm hypoechoic nodule.  All of these were graded TI-RADS 4.      She returns today for follow-up having last been seen on 8/16/2021 at which time she was experiencing left ear, face, neck, and throat pain. She was placed on a course of Cipro and prednisone and we discussed TMJ.  A CT scan of the sinuses was also ordered.  She tells me that she ended up stopping the azelasine and fluticasone as they did not seem to help. CT of the sinus without contrast on 2/11/2022 was unremarkable.  Thyroid ultrasound on  2022 revealed a right inferior 0.4 cm calcification and a stable left 1.1 cm hypoechoic nodule. She is smoking 5 cigarettes daily.  As an aside, she mentions that she has been experiencing intermittent headaches which oftentimes are associated with vertigo.  They seem to last 2 to 3 days in duration and her dizziness is often worse with movement.    Past Medical History:   Diagnosis Date   • Arthritis    • Asthma    • Chronic allergic rhinitis    • COPD (chronic obstructive pulmonary disease) (HCC)    • GERD (gastroesophageal reflux disease)    • Headache    • High cholesterol    • Hyperlipemia    • Limb pain    • Limb swelling    • Myofascial muscle pain    • Recurrent sinus infections    • Jaden's edema of vocal folds    • SOB (shortness of breath)    • Thyroid nodule    • Tinnitus, bilateral    • Tobacco abuse    • Voice hoarseness        Past Surgical History:   Procedure Laterality Date   •  SECTION     • UTERINE FIBROID EMBOLIZATION           Current Outpatient Medications:   •  aspirin 325 MG tablet, aspirin 325 mg oral tablet take 1 tablet (325 mg) by oral route once daily   Active, Disp: , Rfl:   •  cetirizine (zyrTEC) 10 MG tablet, Take 1 tablet by mouth Daily. FOR 30 DAYS, Disp: 90 tablet, Rfl: 1  •  DULoxetine (CYMBALTA) 60 MG capsule, Take 1 capsule by mouth Daily., Disp: 90 capsule, Rfl: 1  •  montelukast (SINGULAIR) 10 MG tablet, Take 10 mg by mouth Every Evening., Disp: , Rfl:   •  nicotine polacrilex (NICORETTE) 4 MG gum, Chew 1 each As Needed for Smoking Cessation. chew 1 piece of gum (4 mg) by oral route every 2 hours as needed and as directed, Disp: 1 each, Rfl: 2  •  omeprazole (priLOSEC) 40 MG capsule, Take 1 capsule by mouth Daily., Disp: 90 capsule, Rfl: 1  •  atorvastatin (LIPITOR) 20 MG tablet, Take 20 mg by mouth every night at bedtime., Disp: , Rfl:   •  Azelastine HCl 137 MCG/SPRAY solution, USE 2 SPRAY(S) IN EACH NOSTRIL TWICE DAILY, Disp: , Rfl:   •  lidocaine (LIDODERM)  "5 %, Place 1 patch on the skin as directed by provider Daily. Remove & Discard patch within 12 hours or as directed by MD, Disp: 30 each, Rfl: 0  •  methocarbamol (ROBAXIN) 500 MG tablet, Take 1 tablet by mouth 3 (Three) Times a Day As Needed for Muscle Spasms., Disp: 60 tablet, Rfl: 0  •  methylPREDNISolone (MEDROL) 4 MG dose pack, Take as directed on package instructions., Disp: 21 each, Rfl: 0     Allergies   Allergen Reactions   • Naproxen Palpitations and Unknown - Low Severity   • Nsaids Palpitations       Social History     Tobacco Use   • Smoking status: Current Every Day Smoker     Packs/day: 0.25     Types: Cigarettes   • Smokeless tobacco: Never Used   Substance Use Topics   • Alcohol use: Not Currently     Comment: rarely    • Drug use: Never        Objective     Vital Signs:   Temp 95.7 °F (35.4 °C) (Temporal)   Ht 157.5 cm (62\")   Wt 92.7 kg (204 lb 6.4 oz)   BMI 37.39 kg/m²       Physical Exam    General: Well developed, well nourished patient of stated age in no acute distress. Voice is strong and clear.   Head: Normocephalic and atraumatic.   Face: No lesions. House-Brackmann I/VI bilaterally.  TMJ crepitus without  muscle tenderness to palpation.  Eyes: PERRLA, sclerae anicteric, no conjunctival injection. Extra ocular movements are intact and full. No nystagmus.   Ears: Auricles are normal in appearance. Bilateral external auditory canals are unremarkable. Bilateral tympanic membranes are clear and without effusion. Hearing normal to conversational voice.   Nose: External nose is normal in appearance. Bilateral nares are patent with normal appearing mucosa. Septum midline. Turbinates are unremarkable. No lesions.   Oral Cavity: Lips are normal in appearance. Oral mucosa is unremarkable. Gingiva is unremarkable.  Partial, worn dentition for age. Tongue is unremarkable with good movement. Hard palate is unremarkable.   Oropharynx: Soft palate is unremarkable with full movement. Uvula is " unremarkable. Bilateral tonsils are unremarkable. Posterior oropharynx is unremarkable.    Larynx and hypopharynx: Deferred secondary to gag reflex.  Neck: Supple.  No mass.  Nontender to palpation.  Trachea midline. Thyroid normal size and without nodules to palpation.   Lymphatic: No lymphadenopathy upon palpation.   Psychiatric: Appropriate affect, cooperative   Neurologic: Oriented x 3, strength symmetric in all extremities, Cranial Nerves II-XII are grossly intact to confrontation   Skin: Warm and dry. No rashes.    Procedures   Result Review :            Assessment and Plan    Diagnoses and all orders for this visit:    1. Thyroid nodule (Primary)    2. Chronic rhinitis    3. Jaden's edema of vocal folds    4. Vestibular migraine         Impressions and findings were discussed with Mrs. Muñoz at great length.  Currently, we reviewed and discussed the images from her CT scan of the sinuses and most recent thyroid ultrasound.  In regards to her headaches and associated vertigo we discussed the possibility that this represents a vestibular migraine and she was provided with information detailing this condition as well as recommendation for magnesium, coenzyme Q-10, and a B vitamin complex.  We discussed the possibility of trying propranolol if this is not helpful amongst other possibilities.  She was given ample time to ask questions, all of which were answered to her satisfaction.      Follow Up   No follow-ups on file.  Patient was given instructions and counseling regarding her condition or for health maintenance advice. Please see specific information pulled into the AVS if appropriate.

## 2022-06-28 ENCOUNTER — TELEPHONE (OUTPATIENT)
Dept: FAMILY MEDICINE CLINIC | Facility: CLINIC | Age: 62
End: 2022-06-28

## 2022-06-28 NOTE — TELEPHONE ENCOUNTER
Caller: Autumn Muñoz    Relationship: Self    Best call back number:[phone]    What is the best time to reach you: ANYTIME    What was the call regarding: PATIENT IS WANTING ANY MEDICATION CALLED IN THAT NEED TO BE CALLED IN.     Do you require a callback: NO

## 2022-06-29 ENCOUNTER — PATIENT MESSAGE (OUTPATIENT)
Dept: OTOLARYNGOLOGY | Facility: CLINIC | Age: 62
End: 2022-06-29

## 2022-06-30 NOTE — TELEPHONE ENCOUNTER
Attempted to call pt back. # not is service. No refills needed at this time. If pt does need something will need to call and specifically states which is needed or have the pharmacy contact us.     OK FOR HUB

## 2022-07-11 ENCOUNTER — APPOINTMENT (OUTPATIENT)
Dept: MRI IMAGING | Facility: HOSPITAL | Age: 62
End: 2022-07-11

## 2022-07-11 ENCOUNTER — HOSPITAL ENCOUNTER (OUTPATIENT)
Dept: MRI IMAGING | Facility: HOSPITAL | Age: 62
End: 2022-07-11

## 2022-08-03 ENCOUNTER — HOSPITAL ENCOUNTER (OUTPATIENT)
Dept: MRI IMAGING | Facility: HOSPITAL | Age: 62
Discharge: HOME | End: 2022-08-03

## 2022-08-03 DIAGNOSIS — G89.29 CHRONIC LOW BACK PAIN, UNSPECIFIED BACK PAIN LATERALITY, UNSPECIFIED WHETHER SCIATICA PRESENT: ICD-10-CM

## 2022-08-03 DIAGNOSIS — M50.30 DDD (DEGENERATIVE DISC DISEASE), CERVICAL: ICD-10-CM

## 2022-08-03 DIAGNOSIS — M54.50 CHRONIC LOW BACK PAIN, UNSPECIFIED BACK PAIN LATERALITY, UNSPECIFIED WHETHER SCIATICA PRESENT: ICD-10-CM

## 2022-08-03 DIAGNOSIS — R93.7 ABNORMAL X-RAY OF LUMBAR SPINE: ICD-10-CM

## 2022-08-03 DIAGNOSIS — M75.102 TEAR OF LEFT ROTATOR CUFF, UNSPECIFIED TEAR EXTENT, UNSPECIFIED WHETHER TRAUMATIC: ICD-10-CM

## 2022-08-03 PROCEDURE — 72148 MRI LUMBAR SPINE W/O DYE: CPT

## 2022-08-03 PROCEDURE — 72141 MRI NECK SPINE W/O DYE: CPT

## 2022-08-03 PROCEDURE — 73221 MRI JOINT UPR EXTREM W/O DYE: CPT

## 2022-08-04 ENCOUNTER — TELEPHONE (OUTPATIENT)
Dept: FAMILY MEDICINE CLINIC | Facility: CLINIC | Age: 62
End: 2022-08-04

## 2022-08-04 DIAGNOSIS — M50.30 DDD (DEGENERATIVE DISC DISEASE), CERVICAL: Primary | ICD-10-CM

## 2022-08-04 DIAGNOSIS — J30.89 ENVIRONMENTAL AND SEASONAL ALLERGIES: ICD-10-CM

## 2022-08-04 RX ORDER — CETIRIZINE HYDROCHLORIDE 10 MG/1
10 TABLET ORAL DAILY
Qty: 90 TABLET | Refills: 1 | Status: SHIPPED | OUTPATIENT
Start: 2022-08-04 | End: 2022-08-23 | Stop reason: SDUPTHER

## 2022-08-04 NOTE — TELEPHONE ENCOUNTER
Caller: Autumn Muñoz    Relationship: Self    Best call back number: [phone]    Requested Prescriptions:   Requested Prescriptions     Pending Prescriptions Disp Refills   • cetirizine (zyrTEC) 10 MG tablet 90 tablet 1     Sig: Take 1 tablet by mouth Daily. FOR 30 DAYS        ALBUTEROL INHALER     Pharmacy where request should be sent: [address] [phone] [facility] [phone] FX     Additional details provided by patient:     THE PATIENT IS OUT OF THE MEDICATIONS       Does the patient have less than a 3 day supply:  [x] Yes  [] No    Adriana Michel Rep   08/04/22 14:53 EDT

## 2022-08-05 DIAGNOSIS — M25.512 CHRONIC LEFT SHOULDER PAIN: Primary | ICD-10-CM

## 2022-08-05 DIAGNOSIS — G89.29 CHRONIC LEFT SHOULDER PAIN: Primary | ICD-10-CM

## 2022-08-05 DIAGNOSIS — R93.6 ABNORMAL MRI, SHOULDER: ICD-10-CM

## 2022-08-05 NOTE — TELEPHONE ENCOUNTER
Caller: Autumn Muñoz    Relationship: Self    Best call back number: [phone]    Caller requesting test results: SELF    What test was performed:3 MRI'S    When was the test performed: 08/03/2022    Where was the test performed: [facility]    Additional notes: PATIENT IS REQUESTING CALL FROM BELINDA TO SPEAK ABOUT MRI RESULTS.

## 2022-08-23 ENCOUNTER — TELEPHONE (OUTPATIENT)
Dept: FAMILY MEDICINE CLINIC | Facility: CLINIC | Age: 62
End: 2022-08-23

## 2022-08-23 DIAGNOSIS — J30.89 ENVIRONMENTAL AND SEASONAL ALLERGIES: ICD-10-CM

## 2022-08-23 DIAGNOSIS — K21.9 CHRONIC GERD: ICD-10-CM

## 2022-08-23 RX ORDER — CETIRIZINE HYDROCHLORIDE 10 MG/1
10 TABLET ORAL DAILY
Qty: 90 TABLET | Refills: 1 | Status: SHIPPED | OUTPATIENT
Start: 2022-08-23 | End: 2022-09-19 | Stop reason: SDUPTHER

## 2022-08-23 NOTE — TELEPHONE ENCOUNTER
Caller: Toni Autumn Lee    Relationship: Self    Best call back number: [phone]    Requested Prescriptions:   Requested Prescriptions     Pending Prescriptions Disp Refills   • cetirizine (zyrTEC) 10 MG tablet 90 tablet 1     Sig: Take 1 tablet by mouth Daily. FOR 30 DAYS        Pharmacy where request should be sent: [address] [phone] [facility] [phone] FX     Does the patient have less than a 3 day supply:  [x] Yes  [] No    Adriana Lugo Rep   08/23/22 10:28 EDT

## 2022-09-19 ENCOUNTER — TELEPHONE (OUTPATIENT)
Dept: FAMILY MEDICINE CLINIC | Facility: CLINIC | Age: 62
End: 2022-09-19

## 2022-09-19 DIAGNOSIS — M25.512 CHRONIC LEFT SHOULDER PAIN: ICD-10-CM

## 2022-09-19 DIAGNOSIS — J30.89 ENVIRONMENTAL AND SEASONAL ALLERGIES: ICD-10-CM

## 2022-09-19 DIAGNOSIS — K21.9 CHRONIC GERD: ICD-10-CM

## 2022-09-19 DIAGNOSIS — G89.29 CHRONIC LEFT SHOULDER PAIN: ICD-10-CM

## 2022-09-19 NOTE — TELEPHONE ENCOUNTER
Caller: Autumn Muñoz Pastor    Relationship: Self    Best call back number: [phone]    Requested Prescriptions:   Requested Prescriptions     Pending Prescriptions Disp Refills   • methocarbamol (ROBAXIN) 500 MG tablet 60 tablet 0     Sig: Take 1 tablet by mouth 3 (Three) Times a Day As Needed for Muscle Spasms.   • cetirizine (zyrTEC) 10 MG tablet 90 tablet 1     Sig: Take 1 tablet by mouth Daily. FOR 30 DAYS   • omeprazole (priLOSEC) 40 MG capsule 90 capsule 1     Sig: Take 1 capsule by mouth Daily.      Pharmacy where request should be sent: [address] [phone] [facility] [phone]      Additional details provided by patient: PATIENT'S CETIRIZINE MEDICATION GOT WET AND SHE IS COMPLETELY OUT. SHE HAS ONE PILL LEFT OF THE OMEPRAZOLE.     Does the patient have less than a 3 day supply:  [x] Yes  [] No    Adriana Schneider Rep   09/19/22 08:42 EDT

## 2022-09-19 NOTE — TELEPHONE ENCOUNTER
Caller: Autumn Muñoz    Relationship: Self    Best call back number: [phone]    Who are you requesting to speak with (clinical staff, provider,  specific staff member): EDDIE BECKER     What was the call regarding: PATIENT WAS SEEN IN THE HOSPITAL FOR BLEEDING FROM HER URETHRA. SHE DOES NOT FEEL LIKE IT HAS FULLY GONE AWAY. SHE WOULD LIKE TO DISCUSS IT WITH HER PROVIDER. PLEASE CALL PATIENT WHEN AVAILABLE.

## 2022-09-20 RX ORDER — OMEPRAZOLE 40 MG/1
40 CAPSULE, DELAYED RELEASE ORAL DAILY
Qty: 90 CAPSULE | Refills: 1 | Status: SHIPPED | OUTPATIENT
Start: 2022-09-20 | End: 2023-01-26

## 2022-09-20 RX ORDER — METHOCARBAMOL 500 MG/1
500 TABLET, FILM COATED ORAL 3 TIMES DAILY PRN
Qty: 60 TABLET | Refills: 0 | Status: SHIPPED | OUTPATIENT
Start: 2022-09-20

## 2022-09-20 RX ORDER — CETIRIZINE HYDROCHLORIDE 10 MG/1
10 TABLET ORAL DAILY
Qty: 90 TABLET | Refills: 1 | Status: SHIPPED | OUTPATIENT
Start: 2022-09-20 | End: 2023-02-03 | Stop reason: SDUPTHER

## 2022-09-27 ENCOUNTER — OFFICE VISIT (OUTPATIENT)
Dept: FAMILY MEDICINE CLINIC | Facility: CLINIC | Age: 62
End: 2022-09-27

## 2022-09-27 ENCOUNTER — LAB (OUTPATIENT)
Dept: LAB | Facility: HOSPITAL | Age: 62
End: 2022-09-27

## 2022-09-27 VITALS
HEART RATE: 74 BPM | DIASTOLIC BLOOD PRESSURE: 80 MMHG | BODY MASS INDEX: 35.75 KG/M2 | HEIGHT: 62 IN | WEIGHT: 194.3 LBS | OXYGEN SATURATION: 97 % | SYSTOLIC BLOOD PRESSURE: 150 MMHG

## 2022-09-27 DIAGNOSIS — E53.8 B12 DEFICIENCY: ICD-10-CM

## 2022-09-27 DIAGNOSIS — J30.89 ENVIRONMENTAL AND SEASONAL ALLERGIES: Primary | ICD-10-CM

## 2022-09-27 DIAGNOSIS — Z13.29 SCREENING FOR THYROID DISORDER: ICD-10-CM

## 2022-09-27 DIAGNOSIS — Z72.0 TOBACCO ABUSE: ICD-10-CM

## 2022-09-27 DIAGNOSIS — E11.9 TYPE 2 DIABETES MELLITUS WITHOUT COMPLICATION, WITHOUT LONG-TERM CURRENT USE OF INSULIN: ICD-10-CM

## 2022-09-27 DIAGNOSIS — E78.5 HYPERLIPIDEMIA, UNSPECIFIED HYPERLIPIDEMIA TYPE: ICD-10-CM

## 2022-09-27 DIAGNOSIS — Z01.89 ROUTINE LAB DRAW: ICD-10-CM

## 2022-09-27 DIAGNOSIS — M19.90 ARTHRITIS: ICD-10-CM

## 2022-09-27 DIAGNOSIS — E55.9 VITAMIN D DEFICIENCY: ICD-10-CM

## 2022-09-27 DIAGNOSIS — N39.0 URINARY TRACT INFECTION WITHOUT HEMATURIA, SITE UNSPECIFIED: ICD-10-CM

## 2022-09-27 LAB
25(OH)D3 SERPL-MCNC: 46.5 NG/ML (ref 30–100)
ALBUMIN UR-MCNC: <1.2 MG/DL
BASOPHILS # BLD AUTO: 0.03 10*3/MM3 (ref 0–0.2)
BASOPHILS NFR BLD AUTO: 0.6 % (ref 0–1.5)
BILIRUB UR QL STRIP: NEGATIVE
CLARITY UR: CLEAR
COLOR UR: YELLOW
DEPRECATED RDW RBC AUTO: 47.6 FL (ref 37–54)
EOSINOPHIL # BLD AUTO: 0.12 10*3/MM3 (ref 0–0.4)
EOSINOPHIL NFR BLD AUTO: 2.3 % (ref 0.3–6.2)
ERYTHROCYTE [DISTWIDTH] IN BLOOD BY AUTOMATED COUNT: 13.9 % (ref 12.3–15.4)
GLUCOSE UR STRIP-MCNC: NEGATIVE MG/DL
HBA1C MFR BLD: 6.3 % (ref 4.8–5.6)
HCT VFR BLD AUTO: 45.5 % (ref 34–46.6)
HGB BLD-MCNC: 14.4 G/DL (ref 12–15.9)
HGB UR QL STRIP.AUTO: NEGATIVE
IMM GRANULOCYTES # BLD AUTO: 0.01 10*3/MM3 (ref 0–0.05)
IMM GRANULOCYTES NFR BLD AUTO: 0.2 % (ref 0–0.5)
KETONES UR QL STRIP: NEGATIVE
LEUKOCYTE ESTERASE UR QL STRIP.AUTO: NEGATIVE
LYMPHOCYTES # BLD AUTO: 2.23 10*3/MM3 (ref 0.7–3.1)
LYMPHOCYTES NFR BLD AUTO: 43 % (ref 19.6–45.3)
MCH RBC QN AUTO: 29.2 PG (ref 26.6–33)
MCHC RBC AUTO-ENTMCNC: 31.6 G/DL (ref 31.5–35.7)
MCV RBC AUTO: 92.3 FL (ref 79–97)
MONOCYTES # BLD AUTO: 0.6 10*3/MM3 (ref 0.1–0.9)
MONOCYTES NFR BLD AUTO: 11.6 % (ref 5–12)
NEUTROPHILS NFR BLD AUTO: 2.2 10*3/MM3 (ref 1.7–7)
NEUTROPHILS NFR BLD AUTO: 42.3 % (ref 42.7–76)
NITRITE UR QL STRIP: NEGATIVE
NRBC BLD AUTO-RTO: 0 /100 WBC (ref 0–0.2)
PH UR STRIP.AUTO: 8.5 [PH] (ref 5–8)
PLATELET # BLD AUTO: 286 10*3/MM3 (ref 140–450)
PMV BLD AUTO: 11.3 FL (ref 6–12)
PROT UR QL STRIP: ABNORMAL
RBC # BLD AUTO: 4.93 10*6/MM3 (ref 3.77–5.28)
SP GR UR STRIP: 1.02 (ref 1–1.03)
UROBILINOGEN UR QL STRIP: ABNORMAL
VIT B12 BLD-MCNC: 901 PG/ML (ref 211–946)
WBC NRBC COR # BLD: 5.19 10*3/MM3 (ref 3.4–10.8)

## 2022-09-27 PROCEDURE — 85025 COMPLETE CBC W/AUTO DIFF WBC: CPT

## 2022-09-27 PROCEDURE — 81003 URINALYSIS AUTO W/O SCOPE: CPT

## 2022-09-27 PROCEDURE — 82306 VITAMIN D 25 HYDROXY: CPT

## 2022-09-27 PROCEDURE — 99214 OFFICE O/P EST MOD 30 MIN: CPT | Performed by: NURSE PRACTITIONER

## 2022-09-27 PROCEDURE — 84443 ASSAY THYROID STIM HORMONE: CPT

## 2022-09-27 PROCEDURE — 82043 UR ALBUMIN QUANTITATIVE: CPT

## 2022-09-27 PROCEDURE — 83036 HEMOGLOBIN GLYCOSYLATED A1C: CPT

## 2022-09-27 PROCEDURE — 80053 COMPREHEN METABOLIC PANEL: CPT

## 2022-09-27 PROCEDURE — 82607 VITAMIN B-12: CPT

## 2022-09-27 PROCEDURE — 36415 COLL VENOUS BLD VENIPUNCTURE: CPT

## 2022-09-27 PROCEDURE — 80061 LIPID PANEL: CPT

## 2022-09-27 RX ORDER — DULOXETIN HYDROCHLORIDE 60 MG/1
60 CAPSULE, DELAYED RELEASE ORAL DAILY
Qty: 90 CAPSULE | Refills: 1 | Status: SHIPPED | OUTPATIENT
Start: 2022-09-27 | End: 2023-01-30 | Stop reason: SDUPTHER

## 2022-09-27 RX ORDER — MONTELUKAST SODIUM 10 MG/1
10 TABLET ORAL EVERY EVENING
Qty: 90 TABLET | Refills: 1 | Status: SHIPPED | OUTPATIENT
Start: 2022-09-27

## 2022-09-27 NOTE — PROGRESS NOTES
Chief Complaint  Urinary Tract Infection    SUBJECTIVE  Autumn Muñoz presents to [facility].     History of Present Illness     Autumn Muñoz is a 61-year-old female who presents today for urinary symptoms.    Urinary symptoms - She complains of bleeding from her urethra. Her urine appears to be  from the liquid. She has noticed in the toilet that when she urinates, it is usually yellow, but at the bottom of the drain, it is a cloud of orange to her. She has been drinking cranberry juice. She felt like she passed a stone for 2 days. She describes the pain as sharp. She passed a stone 34 years ago. She was at work when the pain occurred. She applied antibacterial cream to the area and made a whole pad in case she finished her shift. The next day, she passed another stone. It was not as intense as the first day, but after that, she felt fine. She felt sore there because she could feel the tearing out. About 4 days later, she did not have the sensation that she was passing anything, but at the end of her urine stream, there was pain, and there was more blood droplets than when it occurred with the 2 passing of stones. This is her 3rd or 4th time she has passed a stone. She was having some cramping in her left lower quadrant, which is periodic. She is just monitoring to see if it is gas cramp. She has consistent lower back pain. She had an MRI of the lower spine on 08/03/2022. She has an appointment with neurosurgery, but she had to reschedule because she started a different job. She is using Lidoderm for her back, and Robaxin as needed for her back pain.    Fibromyalgia - She was having an intense fibromyalgia attack at her last ER visit. She was screaming in pain. She had taken 2 of the methocarbamol. By the time she got there, she was at the height of the pain level. She was so tight like a band, and it kept hitting her with sharp pains. She was not sure if it was her  heart. By the time they did the blood work, it started to subside. She would like to increase the duloxetine.    Upper respiratory infection - She has a little upper respiratory infection. She has been drinking honey, and using her inhaler because it felt like the mucus was trapping between her trachea, and how she breathes. She is raspy. She had a child with RSV, and she remembers the mucus being all frothy, and hers was that way up until 09/26/2022. Today, 09/27/2022, is her best day.    Weight loss - She has lost weight since her last visit. She is almost a complete vegetarian. She eats fish, chicken, and only baked. She does not do anything fried. She has been at it since 05/2022. She has lost from 220 pounds to 195 pounds. She has been on the treadmill, and it has helped her with the smoking sensation. She is smoking 5 cigarettes a day. She was smoking half a pack a day. She does not feel the craving until she eats dinner, sitting out with other smokers, and light up.    Thyroid nodules - She has a category 4 nodule in the left lobe of the thyroid gland, but it says documented stability since 2016, and then there was one in the right that is no longer even identified. She has been doing an ultrasound yearly so far. She has seen ENT, Dr. Juares. She has an appointment with him in 10/2022.    Past Medical History:   Diagnosis Date   • Arthritis    • Asthma    • Chronic allergic rhinitis    • COPD (chronic obstructive pulmonary disease) (HCC)    • GERD (gastroesophageal reflux disease)    • Headache    • High cholesterol    • Hyperlipemia    • Limb pain    • Limb swelling    • Myofascial muscle pain    • Recurrent sinus infections    • Jaden's edema of vocal folds    • SOB (shortness of breath)    • Thyroid nodule    • Tinnitus, bilateral    • Tobacco abuse    • Voice hoarseness       Family History   Problem Relation Age of Onset   • Arthritis Father    • Breast cancer Sister 20   • Colon cancer Maternal  "Grandmother 30   • Diabetes Other    • Breast cancer Other       Past Surgical History:   Procedure Laterality Date   •  SECTION     • UTERINE FIBROID EMBOLIZATION          Current Outpatient Medications:   •  DULoxetine (CYMBALTA) 60 MG capsule, Take 1 capsule by mouth Daily., Disp: 90 capsule, Rfl: 1  •  montelukast (SINGULAIR) 10 MG tablet, Take 1 tablet by mouth Every Evening., Disp: 90 tablet, Rfl: 1  •  nicotine polacrilex (NICORETTE) 4 MG gum, Chew 1 each As Needed for Smoking Cessation. chew 1 piece of gum (4 mg) by oral route every 2 hours as needed and as directed, Disp: 1 each, Rfl: 2  •  aspirin 325 MG tablet, aspirin 325 mg oral tablet take 1 tablet (325 mg) by oral route once daily   Active, Disp: , Rfl:   •  cetirizine (zyrTEC) 10 MG tablet, Take 1 tablet by mouth Daily. FOR 30 DAYS, Disp: 90 tablet, Rfl: 1  •  lidocaine (LIDODERM) 5 %, Place 1 patch on the skin as directed by provider Daily. Remove & Discard patch within 12 hours or as directed by MD, Disp: 30 each, Rfl: 0  •  methocarbamol (ROBAXIN) 500 MG tablet, Take 1 tablet by mouth 3 (Three) Times a Day As Needed for Muscle Spasms., Disp: 60 tablet, Rfl: 0  •  omeprazole (priLOSEC) 40 MG capsule, Take 1 capsule by mouth Daily., Disp: 90 capsule, Rfl: 1    OBJECTIVE  Vital Signs:   /80 (BP Location: Right arm, Patient Position: Sitting, Cuff Size: Large Adult)   Pulse 74   Ht 157.5 cm (62\")   Wt 88.1 kg (194 lb 4.8 oz)   SpO2 97%   BMI 35.54 kg/m²    Estimated body mass index is 35.54 kg/m² as calculated from the following:    Height as of this encounter: 157.5 cm (62\").    Weight as of this encounter: 88.1 kg (194 lb 4.8 oz).     Wt Readings from Last 3 Encounters:   22 88.1 kg (194 lb 4.8 oz)   22 92.7 kg (204 lb 6.4 oz)   03/10/22 95 kg (209 lb 6.4 oz)     BP Readings from Last 3 Encounters:   22 150/80   03/10/22 146/83   22 140/73             Physical Exam  Vitals reviewed.   Constitutional:       " General: She is not in acute distress.     Appearance: Normal appearance. She is well-developed.   HENT:      Head: Normocephalic and atraumatic.   Eyes:      Conjunctiva/sclera: Conjunctivae normal.      Pupils: Pupils are equal, round, and reactive to light.   Cardiovascular:      Rate and Rhythm: Normal rate and regular rhythm.      Heart sounds: Normal heart sounds. No murmur heard.  Pulmonary:      Effort: Pulmonary effort is normal. No respiratory distress.      Breath sounds: Normal breath sounds.   Skin:     General: Skin is warm and dry.   Neurological:      Mental Status: She is alert and oriented to person, place, and time.   Psychiatric:         Mood and Affect: Mood and affect normal.         Behavior: Behavior normal.         Thought Content: Thought content normal.         Judgment: Judgment normal.          Result Review    CMP    CMP 9/27/22   Glucose 97   BUN 10   Creatinine 0.86   Sodium 141   Potassium 4.8   Chloride 103   Calcium 10.0   Albumin 4.50   Total Bilirubin 0.3   Alkaline Phosphatase 115   AST (SGOT) 18   ALT (SGPT) 15           CBC    CBC 9/27/22   WBC 5.19   RBC 4.93   Hemoglobin 14.4   Hematocrit 45.5   MCV 92.3   MCH 29.2   MCHC 31.6   RDW 13.9   Platelets 286           CBC w/diff    CBC w/Diff 9/27/22   WBC 5.19   RBC 4.93   Hemoglobin 14.4   Hematocrit 45.5   MCV 92.3   MCH 29.2   MCHC 31.6   RDW 13.9   Platelets 286   Neutrophil Rel % 42.3 (A)   Immature Granulocyte Rel % 0.2   Lymphocyte Rel % 43.0   Monocyte Rel % 11.6   Eosinophil Rel % 2.3   Basophil Rel % 0.6   (A) Abnormal value            Lipid Panel    Lipid Panel 9/27/22   Total Cholesterol 282 (A)   Triglycerides 144   HDL Cholesterol 51   VLDL Cholesterol 27   LDL Cholesterol  204 (A)   LDL/HDL Ratio 3.96   (A) Abnormal value            TSH    TSH 9/27/22   TSH 1.070             MRI Cervical Spine Without Contrast    Result Date: 8/3/2022   Multilevel degenerative changes predominately with uncovertebral joint  spurring to a slightly lesser extent facet arthropathy.  These cause moderate to severe neural foraminal narrowing detailed most significant at the C4-5, C5-6, and C6-7.  No focal disc abnormality or significant central canal stenosis.     BASIL MCCLAIN DO       Electronically Signed and Approved By: BASIL MCCLAIN DO on 8/03/2022 at 16:24             MRI Lumbar Spine Without Contrast    Result Date: 8/3/2022   There is facet arthropathy and to a lesser extent endplate spurring in the lower lumbar spine causing varying degrees of mild to moderate neural foraminal narrowing.  No significant central canal stenosis.  There is a mild broad-based left lateral and far lateral disc protrusion at the L3-4 level which contributes to the moderate left neural foraminal narrowing.  Overall, no significant change since 7/24/2017.      BASIL MCCLAIN DO       Electronically Signed and Approved By: BASIL MCCLAIN DO on 8/03/2022 at 16:34             US Thyroid    Result Date: 6/17/2022    1. Stable TI-RADS category 4 nodule in the left lobe of the thyroid gland.  There has been documented stability of this nodule dating back to 2016. 2. The nodule in the right lobe of the thyroid gland is no longer identified.  There is an incidental 4 mm echogenic shadowing calcification in the inferior pole.   JUAN YODER MD       Electronically Signed and Approved By: JUAN YODER MD on 6/17/2022 at 14:40             MRI Shoulder Left Without Contrast    Result Date: 8/3/2022    1. Moderate subdeltoid/subacromial bursitis 2. Moderate acromioclavicular osteoarthrosis 3. Bursal surface tear of the anterior fibers of the distal supraspinatus tendon extending very near to and possibly through the articular surface.  No significant tendon retraction. 4. Small rim rent and intrasubstance tears of the infra spinatus tendon, as above 5. Mild subscapularis and mild to moderate biceps tendinopathy 6. Degenerative changes with probable superimposed  degenerative tearing of the inferior glenoid labrum.      Girish Palomo M.D.       Electronically Signed and Approved By: Girish Palomo M.D. on 8/03/2022 at 17:18                The above data has been reviewed by EDDIE Barron  09/27/2022 11:20 EDT.          Patient Care Team:  Jane Engle APRN as PCP - General (Nurse Practitioner)    Class 2 Severe Obesity (BMI >=35 and <=39.9). Obesity-related health conditions include the following: GERD. Obesity is improving with lifestyle modifications. BMI is is above average; BMI management plan is completed. We discussed portion control and increasing exercise.       ASSESSMENT & PLAN    Diagnoses and all orders for this visit:    1. Environmental and seasonal allergies (Primary)  Comments:  She will take montelukast.  Orders:  -     montelukast (SINGULAIR) 10 MG tablet; Take 1 tablet by mouth Every Evening.  Dispense: 90 tablet; Refill: 1    2. Arthritis  Assessment & Plan:  She will take Cymbalta.    Orders:  -     DULoxetine (CYMBALTA) 60 MG capsule; Take 1 capsule by mouth Daily.  Dispense: 90 capsule; Refill: 1    3. Urinary tract infection without hematuria, site unspecified  Comments:  A urinalysis was obtained and normal today.  Orders:  -     POC Urinalysis Dipstick, Automated    4. Hyperlipidemia, unspecified hyperlipidemia type  -     Lipid Panel; Future    5. Vitamin D deficiency  -     Vitamin D 25 Hydroxy; Future    6. B12 deficiency  -     Vitamin B12; Future    7. Type 2 diabetes mellitus without complication, without long-term current use of insulin (HCC)  -     CBC & Differential; Future    8. Screening for thyroid disorder  -     TSH; Future  -     Hemoglobin A1c; Future  -     MicroAlbumin, Urine, Random - Urine, Clean Catch; Future    9. Routine lab draw  -     Comprehensive Metabolic Panel; Future  -     Urinalysis With Culture If Indicated -; Future    10. Tobacco abuse  Assessment & Plan:  She request a refill on her  NicoDerm.    Orders:  -     nicotine polacrilex (NICORETTE) 4 MG gum; Chew 1 each As Needed for Smoking Cessation. chew 1 piece of gum (4 mg) by oral route every 2 hours as needed and as directed  Dispense: 1 each; Refill: 2       Tobacco Use: High Risk   • Smoking Tobacco Use: Current Every Day Smoker   • Smokeless Tobacco Use: Never Used       Follow Up     Return in about 6 months (around 3/27/2023).      Patient was given instructions and counseling regarding her condition or for health maintenance advice. Please see specific information pulled into the AVS if appropriate.   I have reviewed information obtained and documented by others and I have confirmed the accuracy of this documented note.    EDDIE Barron       Transcribed from ambient dictation for EDDIE Barron by Mandi Novak.  09/27/22   12:47 EDT    Patient verbalized consent to the visit recording.  I have personally performed the services described in this document as transcribed by the above individual, and it is both accurate and complete.  EDDIE Barron  9/28/2022  21:38 EDT

## 2022-09-28 LAB
ALBUMIN SERPL-MCNC: 4.5 G/DL (ref 3.5–5.2)
ALBUMIN/GLOB SERPL: 1.7 G/DL
ALP SERPL-CCNC: 115 U/L (ref 39–117)
ALT SERPL W P-5'-P-CCNC: 15 U/L (ref 1–33)
ANION GAP SERPL CALCULATED.3IONS-SCNC: 9.3 MMOL/L (ref 5–15)
AST SERPL-CCNC: 18 U/L (ref 1–32)
BILIRUB SERPL-MCNC: 0.3 MG/DL (ref 0–1.2)
BUN SERPL-MCNC: 10 MG/DL (ref 8–23)
BUN/CREAT SERPL: 11.6 (ref 7–25)
CALCIUM SPEC-SCNC: 10 MG/DL (ref 8.6–10.5)
CHLORIDE SERPL-SCNC: 103 MMOL/L (ref 98–107)
CHOLEST SERPL-MCNC: 282 MG/DL (ref 0–200)
CO2 SERPL-SCNC: 28.7 MMOL/L (ref 22–29)
CREAT SERPL-MCNC: 0.86 MG/DL (ref 0.57–1)
EGFRCR SERPLBLD CKD-EPI 2021: 77.5 ML/MIN/1.73
GLOBULIN UR ELPH-MCNC: 2.6 GM/DL
GLUCOSE SERPL-MCNC: 97 MG/DL (ref 65–99)
HDLC SERPL-MCNC: 51 MG/DL (ref 40–60)
LDLC SERPL CALC-MCNC: 204 MG/DL (ref 0–100)
LDLC/HDLC SERPL: 3.96 {RATIO}
POTASSIUM SERPL-SCNC: 4.8 MMOL/L (ref 3.5–5.2)
PROT SERPL-MCNC: 7.1 G/DL (ref 6–8.5)
SODIUM SERPL-SCNC: 141 MMOL/L (ref 136–145)
TRIGL SERPL-MCNC: 144 MG/DL (ref 0–150)
TSH SERPL DL<=0.05 MIU/L-ACNC: 1.07 UIU/ML (ref 0.27–4.2)
VLDLC SERPL-MCNC: 27 MG/DL (ref 5–40)

## 2022-09-29 DIAGNOSIS — E11.9 TYPE 2 DIABETES MELLITUS WITHOUT COMPLICATION, WITHOUT LONG-TERM CURRENT USE OF INSULIN: ICD-10-CM

## 2022-09-29 DIAGNOSIS — E78.5 HYPERLIPIDEMIA, UNSPECIFIED HYPERLIPIDEMIA TYPE: Primary | ICD-10-CM

## 2022-10-26 ENCOUNTER — TELEPHONE (OUTPATIENT)
Dept: FAMILY MEDICINE CLINIC | Facility: CLINIC | Age: 62
End: 2022-10-26

## 2022-10-26 NOTE — TELEPHONE ENCOUNTER
PATIENT CALLED ASKING TO EITHER BE SEEN OR TO HAVE YOU CALL HER IN A ANTIBIOTIC FOR HER SINUS INFECTION THAT IS NOW IN THE CHEST.   [phone]

## 2022-10-27 DIAGNOSIS — J06.9 UPPER RESPIRATORY TRACT INFECTION, UNSPECIFIED TYPE: Primary | ICD-10-CM

## 2022-10-27 RX ORDER — AZITHROMYCIN 250 MG/1
TABLET, FILM COATED ORAL
Qty: 6 TABLET | Refills: 0 | Status: SHIPPED | OUTPATIENT
Start: 2022-10-27 | End: 2023-01-13

## 2022-11-14 DIAGNOSIS — J06.9 UPPER RESPIRATORY TRACT INFECTION, UNSPECIFIED TYPE: ICD-10-CM

## 2022-11-15 ENCOUNTER — TELEPHONE (OUTPATIENT)
Dept: FAMILY MEDICINE CLINIC | Facility: CLINIC | Age: 62
End: 2022-11-15

## 2022-11-15 RX ORDER — AZITHROMYCIN 250 MG/1
TABLET, FILM COATED ORAL
Qty: 6 TABLET | Refills: 0 | OUTPATIENT
Start: 2022-11-15

## 2022-11-15 NOTE — TELEPHONE ENCOUNTER
Caller: Toni Autumn Lee    Relationship: Self    Best call back number: [phone]    What medication are you requesting: AMOXICILLIN    What are your current symptoms: GREEN MUCUS, HOARSE, SORE THROAT    How long have you been experiencing symptoms: 3 WEEKS     Have you had these symptoms before:    [x] Yes  [] No    Have you been treated for these symptoms before:   [x] Yes  [] No    If a prescription is needed, what is your preferred pharmacy and phone number: [address] [place] [address] [phone] [facility] [phone] FX     Additional notes: PATIENT WAS SEEN AT THE ER FOR HER SYMPTOMS. THE ER WAS SUPPOSED TO SEND IN AMOXICILLIN TO HER PHARMACY. THE PHARMACY DID NOT RECEIVE THE MEDICATION. SHE WOULD LIKE TO REQUEST THE MEDICATION FROM HER PROVIDER. PLEASE CALL PATIENT TO ADVISE.

## 2022-12-13 ENCOUNTER — TELEPHONE (OUTPATIENT)
Dept: OTOLARYNGOLOGY | Facility: CLINIC | Age: 62
End: 2022-12-13

## 2022-12-13 NOTE — TELEPHONE ENCOUNTER
Provider: DR ANGUIANO  Caller: MAHNAZ PEREZ  Relationship to Patient: SELF  Phone Number: [phone]  Reason for Call: PT STATES HER THROAT IS RAW AND SHE CAN BARELY TALK, PROBLEMS WITH EARS AND THROAT, SHE IS CONCERNED BECAUSE OF HER THYROID NODULES. FEELS LIKE SOMETHING IS CLOSING OFF IN HER TRACHEA. SAYS SHE WAKES UP GASPING.  When was the patient last seen: 6/22/22  When did it start: 2-3 WEEKS  Characteristics of symptom/severity: SEVERE  Timing- Is it constant or intermittent: CONSTANT  What therapies/medications have you tried: MUCINEX, COLD/FLU MED, CHLOROSEPTIC, 2 ROUNDS OF ANTIBIOTICS

## 2023-01-13 ENCOUNTER — OFFICE VISIT (OUTPATIENT)
Dept: OTOLARYNGOLOGY | Facility: CLINIC | Age: 63
End: 2023-01-13
Payer: COMMERCIAL

## 2023-01-13 VITALS — WEIGHT: 194.2 LBS | HEIGHT: 62 IN | BODY MASS INDEX: 35.74 KG/M2 | TEMPERATURE: 97.1 F

## 2023-01-13 DIAGNOSIS — J31.0 CHRONIC RHINITIS: ICD-10-CM

## 2023-01-13 DIAGNOSIS — M79.18 MYOFASCIAL MUSCLE PAIN: ICD-10-CM

## 2023-01-13 DIAGNOSIS — J04.0 LARYNGITIS: ICD-10-CM

## 2023-01-13 DIAGNOSIS — J38.1 REINKE'S EDEMA OF VOCAL FOLDS: Primary | ICD-10-CM

## 2023-01-13 PROCEDURE — 99213 OFFICE O/P EST LOW 20 MIN: CPT | Performed by: OTOLARYNGOLOGY

## 2023-01-13 NOTE — PROGRESS NOTES
Patient Name: Autumn Muñoz   Visit Date: 01/13/2023   Patient ID: [id number]  Provider: Mark Juares MD    Sex: female  [facility] [facility]   YOB: 1960  Location Address: [address] [place] [address], [address],?[place]?[address]    Primary Care Provider Jane Engle, EDDIE  Location Phone: [phone]    Referring Provider: No ref. provider found        Chief Complaint  Throat sore/voice changed    Earache       Autumn Muñoz is a 62 y.o. 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  She was originally seen on 3/20/2019.  Please see that note for further details.  She was seen on 5/24/2021 which time she reported daily headaches affecting her bilateral forehead and radiating to the occiput.  She is a 1/2 pack/day smoker and was prescribed nicotine gum.  She declined any follow-up laryngoscopic examination.  For evaluation of her chronic headache and MRI of the brain with and without contrast was ordered. MRI of the brain without contrast on 6/9/2021 was unremarkable.  The sinuses, middle ears, and mastoids were clear. Thyroid ultrasound on 6/9/2021 revealed a slightly enlarged thyroid with a right upper 0.6 cm hypoechoic nodule, right lower 0.4 cm calcified nodule, and a left upper 1.2 cm hypoechoic nodule.  All of these were graded TI-RADS 4.  She was seen on 8/16/2021 for left ear, face, neck, and throat pain. CT of the sinus without contrast on 2/11/2022 was unremarkable.  Thyroid ultrasound on 6/17/2022 revealed a right inferior 0.4 cm calcification and a stable left 1.1 cm hypoechoic nodule.     She returns today for follow-up having last been seen on 6/22/2022 at which time we discussed vestibular migraine supplementation. She tells me that she believes that she contracted covid around  Elisewatson.  Since that time her hoarseness has increased and she has been experiencing some right-sided sore throat and otalgia.  She is aphonic at times and the hoarseness is worse with increased voice use.  It is occasionally sore to talk.  She has experienced some yellow-green rhinorrhea which has since turned to clear.  She is currently taking cetirizine and montelukast.  She was placed on amoxicillin on 2022.  She continues to smoke around 5 cigarettes daily.      Past Medical History:   Diagnosis Date   • Arthritis    • Asthma    • Chronic allergic rhinitis    • COPD (chronic obstructive pulmonary disease) (HCC)    • GERD (gastroesophageal reflux disease)    • Headache    • High cholesterol    • Hyperlipemia    • Limb pain    • Limb swelling    • Myofascial muscle pain    • Recurrent sinus infections    • Jaden's edema of vocal folds    • SOB (shortness of breath)    • Thyroid nodule    • Tinnitus, bilateral    • Tobacco abuse    • Voice hoarseness        Past Surgical History:   Procedure Laterality Date   •  SECTION     • UTERINE FIBROID EMBOLIZATION           Current Outpatient Medications:   •  aspirin 325 MG tablet, aspirin 325 mg oral tablet take 1 tablet (325 mg) by oral route once daily   Active, Disp: , Rfl:   •  cetirizine (zyrTEC) 10 MG tablet, Take 1 tablet by mouth Daily. FOR 30 DAYS, Disp: 90 tablet, Rfl: 1  •  DULoxetine (CYMBALTA) 60 MG capsule, Take 1 capsule by mouth Daily., Disp: 90 capsule, Rfl: 1  •  lidocaine (LIDODERM) 5 %, Place 1 patch on the skin as directed by provider Daily. Remove & Discard patch within 12 hours or as directed by MD, Disp: 30 each, Rfl: 0  •  methocarbamol (ROBAXIN) 500 MG tablet, Take 1 tablet by mouth 3 (Three) Times a Day As Needed for Muscle Spasms., Disp: 60 tablet, Rfl: 0  •  montelukast (SINGULAIR) 10 MG tablet, Take 1 tablet by mouth Every Evening., Disp: 90 tablet, Rfl: 1  •  nicotine polacrilex (NICORETTE) 4 MG gum, Chew 1 each As Needed  "for Smoking Cessation. chew 1 piece of gum (4 mg) by oral route every 2 hours as needed and as directed, Disp: 1 each, Rfl: 2  •  omeprazole (priLOSEC) 40 MG capsule, Take 1 capsule by mouth Daily., Disp: 90 capsule, Rfl: 1     Allergies   Allergen Reactions   • Naproxen Palpitations and Unknown - Low Severity   • Nsaids Palpitations       Social History     Tobacco Use   • Smoking status: Every Day     Packs/day: 0.25     Types: Cigarettes   • Smokeless tobacco: Never   Substance Use Topics   • Alcohol use: Not Currently     Comment: rarely    • Drug use: Never        Objective     Vital Signs:   Temp 97.1 °F (36.2 °C) (Temporal)   Ht 157.5 cm (62\")   Wt 88.1 kg (194 lb 3.2 oz)   BMI 35.52 kg/m²       Physical Exam    General: Well developed, well nourished patient of stated age in no acute distress. Voice is strong and clear.   Head: Normocephalic and atraumatic.   Face: No lesions. House-Brackmann I/VI bilaterally.  TMJ crepitus without  muscle tenderness to palpation.  Eyes: PERRLA, sclerae anicteric, no conjunctival injection. Extra ocular movements are intact and full. No nystagmus.   Ears: Auricles are normal in appearance. Bilateral external auditory canals are unremarkable. Bilateral tympanic membranes are clear and without effusion. Hearing normal to conversational voice.   Nose: External nose is normal in appearance. Bilateral nares are patent with normal appearing mucosa. Septum midline. Turbinates are unremarkable. No lesions.   Oral Cavity: Lips are normal in appearance. Oral mucosa is unremarkable. Gingiva is unremarkable.  Partial, worn dentition for age. Tongue is unremarkable with good movement. Hard palate is unremarkable.   Oropharynx: Soft palate is unremarkable with full movement. Uvula is unremarkable. Bilateral tonsils are unremarkable. Posterior oropharynx is unremarkable.    Larynx and hypopharynx: Deferred secondary to gag reflex.  Neck: Supple.  No mass.  Nontender to " palpation.  Trachea midline. Thyroid normal size and without nodules to palpation.   Lymphatic: No lymphadenopathy upon palpation.   Psychiatric: Appropriate affect, cooperative   Neurologic: Oriented x 3, strength symmetric in all extremities, Cranial Nerves II-XII are grossly intact to confrontation   Skin: Warm and dry. No rashes.    Procedures   Result Review :     Procedure: Flexible fiberoptic nasolaryngoscopy.    Indications: Increasing hoarseness and sore throat in a smoker.    Summary: The patient's bilateral nares were decongested and anesthetized with Afrin and lidocaine sprays respectively. After giving the medications ample time to take effect flexible fiberoptic scope was inserted in the patient's right naris revealing a normal-appearing nasal cavity and nasopharynx. The base of tongue, vallecula, epiglottis, aryepiglottic folds, and arytenoid cartilages are all normal-appearing aside from some edema and erythema of the arytenoids and posterior commissure. The piriform sinuses were normal in appearance. The bilateral false are normal in appearance.  The bilateral true vocal folds demonstrate right greater than left Jaden's edema as well as erythema.  They adducted and abducted normally. The subglottis was widely patent. The patient tolerated the procedure well.       Assessment and Plan    Diagnoses and all orders for this visit:    1. Jaden's edema of vocal folds (Primary)    2. Chronic rhinitis    3. Myofascial muscle pain    4. Laryngitis         Impressions and findings were discussed with Mrs. Muñoz at great length.  Currently, she is seen for evaluation of worsening hoarseness and sore throat.  Examination today reveals right greater than left Jaden's edema with mild erythema of the true vocal folds.  We discussed the pathophysiology and natural history of this condition.  Options for management were discussed including a trial of prednisone versus continued observation.  At this time, she  would like to proceed continued observation and follow-up in 1 month for repeat examination or sooner if needed.    Follow Up   Return in about 4 weeks (around 2/10/2023).  Patient was given instructions and counseling regarding her condition or for health maintenance advice. Please see specific information pulled into the AVS if appropriate.

## 2023-01-26 DIAGNOSIS — K21.9 CHRONIC GERD: ICD-10-CM

## 2023-01-26 RX ORDER — OMEPRAZOLE 40 MG/1
CAPSULE, DELAYED RELEASE ORAL
Qty: 90 CAPSULE | Refills: 1 | Status: SHIPPED | OUTPATIENT
Start: 2023-01-26

## 2023-01-30 ENCOUNTER — TELEPHONE (OUTPATIENT)
Dept: FAMILY MEDICINE CLINIC | Facility: CLINIC | Age: 63
End: 2023-01-30
Payer: COMMERCIAL

## 2023-01-30 DIAGNOSIS — M19.90 ARTHRITIS: ICD-10-CM

## 2023-01-30 DIAGNOSIS — Z20.822 EXPOSURE TO COVID-19 VIRUS: Primary | ICD-10-CM

## 2023-01-30 RX ORDER — DULOXETIN HYDROCHLORIDE 60 MG/1
60 CAPSULE, DELAYED RELEASE ORAL DAILY
Qty: 90 CAPSULE | Refills: 1 | Status: SHIPPED | OUTPATIENT
Start: 2023-01-30 | End: 2023-02-03 | Stop reason: SDUPTHER

## 2023-01-30 NOTE — TELEPHONE ENCOUNTER
Caller: Autumn Muñoz    Relationship: Self    Best call back number: [phone]    What orders are you requesting (i.e. lab or imaging): TEST TO DETERMINE IF PATIENT EVER HAD COVID      In what timeframe would the patient need to come in: AS SOON AS POSSIBLE    Where will you receive your lab/imaging services: SOMEWHERE THAT CAN PERFORM THE TEST    Additional notes: PATIENT WOULD LIKE ORDERS TO GET LABS DONE TO SEE IF SHE HAS HAD COVID IN THE PAST.

## 2023-01-30 NOTE — TELEPHONE ENCOUNTER
Caller: Autumn Muñoz    Relationship: Self    Best call back number: [phone]    Requested Prescriptions:   ALBUTEROL INHALER, AND ANY REFILLS NEEDED  - DULoxetine (CYMBALTA) 60 MG capsule    Pharmacy where request should be sent: [address] [phone] [facility] [phone] FX     Additional details provided by patient: PATIENT IS NEEDING NEW SCRIPT SENT TO PHARMACY.     Does the patient have less than a 3 day supply:  [x] Yes  [] No    Would you like a call back once the refill request has been completed: [x] Yes [] No    If the office needs to give you a call back, can they leave a voicemail: [x] Yes [] No    Adriana Urrutia Rep   01/30/23 12:45 EST

## 2023-01-31 ENCOUNTER — TELEPHONE (OUTPATIENT)
Dept: FAMILY MEDICINE CLINIC | Facility: CLINIC | Age: 63
End: 2023-01-31
Payer: COMMERCIAL

## 2023-01-31 DIAGNOSIS — R06.02 SHORTNESS OF BREATH: Primary | ICD-10-CM

## 2023-01-31 RX ORDER — ALBUTEROL SULFATE 90 UG/1
2 AEROSOL, METERED RESPIRATORY (INHALATION) EVERY 4 HOURS PRN
Qty: 6.7 G | Refills: 2 | Status: SHIPPED | OUTPATIENT
Start: 2023-01-31 | End: 2023-02-03 | Stop reason: SDUPTHER

## 2023-01-31 NOTE — TELEPHONE ENCOUNTER
The patient phoned stating that she needs her rescue inhaler filled. I advised the patient that it was not on her medication list and she stated that she has been on this medication for years. I ask what is the name of the medication and she said it is Albuterol and she needs it refilled today she is out.

## 2023-02-02 ENCOUNTER — LAB (OUTPATIENT)
Dept: LAB | Facility: HOSPITAL | Age: 63
End: 2023-02-02
Payer: COMMERCIAL

## 2023-02-02 DIAGNOSIS — E11.9 TYPE 2 DIABETES MELLITUS WITHOUT COMPLICATION, WITHOUT LONG-TERM CURRENT USE OF INSULIN: ICD-10-CM

## 2023-02-02 DIAGNOSIS — E78.5 HYPERLIPIDEMIA, UNSPECIFIED HYPERLIPIDEMIA TYPE: ICD-10-CM

## 2023-02-02 DIAGNOSIS — Z20.822 EXPOSURE TO COVID-19 VIRUS: ICD-10-CM

## 2023-02-02 LAB
CHOLEST SERPL-MCNC: 299 MG/DL (ref 0–200)
HBA1C MFR BLD: 6.5 % (ref 4.8–5.6)
HDLC SERPL-MCNC: 63 MG/DL (ref 40–60)
LDLC SERPL CALC-MCNC: 211 MG/DL (ref 0–100)
LDLC/HDLC SERPL: 3.31 {RATIO}
TRIGL SERPL-MCNC: 137 MG/DL (ref 0–150)
VLDLC SERPL-MCNC: 25 MG/DL (ref 5–40)

## 2023-02-02 PROCEDURE — 86769 SARS-COV-2 COVID-19 ANTIBODY: CPT

## 2023-02-02 PROCEDURE — 83036 HEMOGLOBIN GLYCOSYLATED A1C: CPT

## 2023-02-02 PROCEDURE — 36415 COLL VENOUS BLD VENIPUNCTURE: CPT

## 2023-02-02 PROCEDURE — 80061 LIPID PANEL: CPT

## 2023-02-03 DIAGNOSIS — M19.90 ARTHRITIS: ICD-10-CM

## 2023-02-03 DIAGNOSIS — E78.5 HYPERLIPIDEMIA, UNSPECIFIED HYPERLIPIDEMIA TYPE: Primary | ICD-10-CM

## 2023-02-03 DIAGNOSIS — R06.02 SHORTNESS OF BREATH: ICD-10-CM

## 2023-02-03 DIAGNOSIS — J30.89 ENVIRONMENTAL AND SEASONAL ALLERGIES: ICD-10-CM

## 2023-02-03 RX ORDER — ALBUTEROL SULFATE 90 UG/1
2 AEROSOL, METERED RESPIRATORY (INHALATION) EVERY 4 HOURS PRN
Qty: 6.7 G | Refills: 2 | Status: SHIPPED | OUTPATIENT
Start: 2023-02-03

## 2023-02-03 RX ORDER — CETIRIZINE HYDROCHLORIDE 10 MG/1
10 TABLET ORAL DAILY
Qty: 90 TABLET | Refills: 1 | Status: SHIPPED | OUTPATIENT
Start: 2023-02-03

## 2023-02-03 RX ORDER — ATORVASTATIN CALCIUM 10 MG/1
10 TABLET, FILM COATED ORAL DAILY
Qty: 90 TABLET | Refills: 1 | Status: SHIPPED | OUTPATIENT
Start: 2023-02-03

## 2023-02-03 RX ORDER — DULOXETIN HYDROCHLORIDE 60 MG/1
60 CAPSULE, DELAYED RELEASE ORAL DAILY
Qty: 90 CAPSULE | Refills: 1 | Status: SHIPPED | OUTPATIENT
Start: 2023-02-03

## 2023-02-04 LAB — SARS-COV-2 AB SERPL QL IA: POSITIVE

## 2023-02-09 ENCOUNTER — TELEPHONE (OUTPATIENT)
Dept: FAMILY MEDICINE CLINIC | Facility: CLINIC | Age: 63
End: 2023-02-09
Payer: COMMERCIAL

## 2023-02-09 NOTE — TELEPHONE ENCOUNTER
The patient phoned stating that she knows she had COVID after thanksgiving, she was not tested but she needs a work note for 2 days  2/4 and 2/5. I advised the patient that I would ask but since she was not seen in the office that she may not do this note.

## 2023-02-10 NOTE — TELEPHONE ENCOUNTER
Phoned patient advised that Jane cannot give her a work note  The patient stated that she understood.

## 2023-02-28 ENCOUNTER — TELEPHONE (OUTPATIENT)
Dept: FAMILY MEDICINE CLINIC | Facility: CLINIC | Age: 63
End: 2023-02-28
Payer: COMMERCIAL

## 2023-02-28 NOTE — TELEPHONE ENCOUNTER
Caller: Autumn Muñoz    Relationship: Self    Best call back number: [phone]    What medication are you requesting: SOMETHING FOR MOUTH ABCESS    What are your current symptoms: ABCESS ON GUM IN BACK OF MOUTH WHERE WISDOM TOOTH WAS PULLED. KNOT GETTING BIGGER PER PATIENT.     How long have you been experiencing symptoms: 1 WEEK    If a prescription is needed, what is your preferred pharmacy and phone number: [facility] [phone] [place], [place]

## 2023-04-15 DIAGNOSIS — K21.9 CHRONIC GERD: ICD-10-CM

## 2023-04-17 RX ORDER — OMEPRAZOLE 40 MG/1
40 CAPSULE, DELAYED RELEASE ORAL DAILY
Qty: 90 CAPSULE | Refills: 0 | Status: SHIPPED | OUTPATIENT
Start: 2023-04-17

## 2023-06-07 ENCOUNTER — OFFICE VISIT (OUTPATIENT)
Dept: FAMILY MEDICINE CLINIC | Facility: CLINIC | Age: 63
End: 2023-06-07
Payer: COMMERCIAL

## 2023-06-07 VITALS
OXYGEN SATURATION: 98 % | SYSTOLIC BLOOD PRESSURE: 120 MMHG | HEIGHT: 62 IN | DIASTOLIC BLOOD PRESSURE: 88 MMHG | BODY MASS INDEX: 35.81 KG/M2 | TEMPERATURE: 97.5 F | HEART RATE: 84 BPM | RESPIRATION RATE: 16 BRPM | WEIGHT: 194.6 LBS

## 2023-06-07 DIAGNOSIS — Z00.00 ANNUAL PHYSICAL EXAM: ICD-10-CM

## 2023-06-07 DIAGNOSIS — Z12.31 ENCOUNTER FOR SCREENING MAMMOGRAM FOR MALIGNANT NEOPLASM OF BREAST: ICD-10-CM

## 2023-06-07 DIAGNOSIS — E11.9 TYPE 2 DIABETES MELLITUS WITHOUT COMPLICATION, WITHOUT LONG-TERM CURRENT USE OF INSULIN: ICD-10-CM

## 2023-06-07 DIAGNOSIS — E04.1 THYROID NODULE: ICD-10-CM

## 2023-06-07 DIAGNOSIS — Z76.89 ENCOUNTER TO ESTABLISH CARE: Primary | ICD-10-CM

## 2023-06-07 DIAGNOSIS — Z87.891 HISTORY OF SMOKING: ICD-10-CM

## 2023-06-07 DIAGNOSIS — N94.9 VAGINAL DISCOMFORT: ICD-10-CM

## 2023-06-07 DIAGNOSIS — M25.50 ARTHRALGIA, UNSPECIFIED JOINT: ICD-10-CM

## 2023-06-07 DIAGNOSIS — K29.50 CHRONIC GASTRITIS WITHOUT BLEEDING, UNSPECIFIED GASTRITIS TYPE: ICD-10-CM

## 2023-06-07 DIAGNOSIS — Z12.11 SCREENING FOR COLON CANCER: ICD-10-CM

## 2023-06-07 NOTE — PROGRESS NOTES
"Chief Complaint  Establishing care for management of diabetes/thyroid nodule/medications    Subjective         Autumn Muñoz is a 63 y.o. female who presents to [facility]    63 years old comes to the clinic today to establish care and management of chronic conditions/medications.    Diabetes type 2, controlled, diet controlled.  Not on any medications, monitoring diet.    Hyperlipidemia; patient is taking Lipitor 10, reports no side effects.  Repeat blood work needed    Patient is doing well on Cymbalta for anxiety and neuropathic pain.    Patient does have a chronic history of multiple joint pain including spine and shoulders.  Patient does not want to take any NSAIDs or medications and does not want to see any specialist at this time.    Patient does have a history of thyroid nodule and lung nodule, patient is requesting for thyroid ultrasound.    Current smoker, due for lung cancer screening.    Denies any chest pain or shortness of breath on exertion.    Patient does report 1 month active symptoms of intermittent vaginal buzzing/vibrating like feeling without any discharge/pain or any urinary symptoms, sexually active without any difficulties or pain.    12+ review of systems otherwise unremarkable        Objective   Vital Signs:   Vitals:    06/07/23 1309   BP: 120/88   BP Location: Left arm   Patient Position: Sitting   Cuff Size: Large Adult   Pulse: 84   Resp: 16   Temp: 97.5 °F (36.4 °C)   TempSrc: Temporal   SpO2: 98%   Weight: 88.3 kg (194 lb 9.6 oz)   Height: 157.5 cm (62\")      Body mass index is 35.59 kg/m².   Wt Readings from Last 3 Encounters:   06/07/23 88.3 kg (194 lb 9.6 oz)   01/13/23 88.1 kg (194 lb 3.2 oz)   09/27/22 88.1 kg (194 lb 4.8 oz)      BP Readings from Last 3 Encounters:   06/07/23 120/88   09/27/22 150/80   03/10/22 146/83        Patient Care Team:  Pavan Lee MD as PCP - General (Family Medicine)     Physical Exam  Vitals reviewed. "   Constitutional:       Appearance: Normal appearance. She is well-developed.   HENT:      Head: Normocephalic and atraumatic.      Right Ear: External ear normal.      Left Ear: External ear normal.      Mouth/Throat:      Pharynx: No oropharyngeal exudate.   Eyes:      Conjunctiva/sclera: Conjunctivae normal.      Pupils: Pupils are equal, round, and reactive to light.   Cardiovascular:      Rate and Rhythm: Normal rate and regular rhythm.      Heart sounds: No murmur heard.    No friction rub. No gallop.   Pulmonary:      Effort: Pulmonary effort is normal.      Breath sounds: Normal breath sounds. No wheezing or rhonchi.   Abdominal:      General: Bowel sounds are normal. There is no distension.      Palpations: Abdomen is soft.      Tenderness: There is no abdominal tenderness.   Skin:     General: Skin is warm and dry.   Neurological:      Mental Status: She is alert and oriented to person, place, and time.      Cranial Nerves: No cranial nerve deficit.   Psychiatric:         Mood and Affect: Mood and affect normal.         Behavior: Behavior normal.         Thought Content: Thought content normal.         Judgment: Judgment normal.                     Assessment and Plan   Diagnoses and all orders for this visit:    1. Encounter to establish care (Primary)  -     TSH Rfx On Abnormal To Free T4; Future  -     CBC & Differential; Future  -     Comprehensive Metabolic Panel; Future  -     Hemoglobin A1c; Future  -     Lipid Panel; Future  -     Vitamin B12; Future  -     Folate; Future  -     IVON With / DsDNA, RNP, Sjogrens A / B, Smith; Future  -     Microalbumin / Creatinine Urine Ratio - Urine, Clean Catch  -     Urinalysis With Microscopic - Urine, Clean Catch; Future    2. Type 2 diabetes mellitus without complication, without long-term current use of insulin  Comments:  Last A1C 6.5, diet controlled.  New blood work needed/may consider metformin, continue with Lipitor.  Orders:  -     TSH Rfx On Abnormal  To Free T4; Future  -     CBC & Differential; Future  -     Comprehensive Metabolic Panel; Future  -     Hemoglobin A1c; Future  -     Lipid Panel; Future  -     Vitamin B12; Future  -     Folate; Future  -     IVON With / DsDNA, RNP, Sjogrens A / B, Smith; Future  -     Microalbumin / Creatinine Urine Ratio - Urine, Clean Catch  -     Urinalysis With Microscopic - Urine, Clean Catch; Future    3. Thyroid nodule  Comments:  Patient would like to get 1 year follow-up ultrasound  Orders:  -     US Thyroid; Future  -     TSH Rfx On Abnormal To Free T4; Future  -     CBC & Differential; Future  -     Comprehensive Metabolic Panel; Future  -     Hemoglobin A1c; Future  -     Lipid Panel; Future  -     Vitamin B12; Future  -     Folate; Future  -     IVON With / DsDNA, RNP, Sjogrens A / B, Smith; Future  -     Microalbumin / Creatinine Urine Ratio - Urine, Clean Catch  -     Urinalysis With Microscopic - Urine, Clean Catch; Future    4. Encounter for screening mammogram for malignant neoplasm of breast  -     Mammo Screening Digital Tomosynthesis Bilateral With CAD; Future  -     TSH Rfx On Abnormal To Free T4; Future  -     CBC & Differential; Future  -     Comprehensive Metabolic Panel; Future  -     Hemoglobin A1c; Future  -     Lipid Panel; Future  -     Vitamin B12; Future  -     Folate; Future  -     IVON With / DsDNA, RNP, Sjogrens A / B, Smith; Future  -     Microalbumin / Creatinine Urine Ratio - Urine, Clean Catch  -     Urinalysis With Microscopic - Urine, Clean Catch; Future    5. Screening for colon cancer  -     Ambulatory Referral For Screening Colonoscopy  -     TSH Rfx On Abnormal To Free T4; Future  -     CBC & Differential; Future  -     Comprehensive Metabolic Panel; Future  -     Hemoglobin A1c; Future  -     Lipid Panel; Future  -     Vitamin B12; Future  -     Folate; Future  -     IVON With / DsDNA, RNP, Sjogrens A / B, Smith; Future  -     Microalbumin / Creatinine Urine Ratio - Urine, Clean Catch  -      Urinalysis With Microscopic - Urine, Clean Catch; Future    6. Chronic gastritis without bleeding, unspecified gastritis type  Comments:  Requesting EGD for long history of GERD/gastritis  Orders:  -     Ambulatory referral for Screening EGD  -     TSH Rfx On Abnormal To Free T4; Future  -     CBC & Differential; Future  -     Comprehensive Metabolic Panel; Future  -     Hemoglobin A1c; Future  -     Lipid Panel; Future  -     Vitamin B12; Future  -     Folate; Future  -     IVON With / DsDNA, RNP, Sjogrens A / B, Smith; Future  -     Microalbumin / Creatinine Urine Ratio - Urine, Clean Catch  -     Urinalysis With Microscopic - Urine, Clean Catch; Future    7. History of smoking  Comments:  Current smoker  Orders:  -      CT Chest Low Dose Cancer Screening WO; Future  -     TSH Rfx On Abnormal To Free T4; Future  -     CBC & Differential; Future  -     Comprehensive Metabolic Panel; Future  -     Hemoglobin A1c; Future  -     Lipid Panel; Future  -     Vitamin B12; Future  -     Folate; Future  -     IVON With / DsDNA, RNP, Sjogrens A / B, Smith; Future  -     Microalbumin / Creatinine Urine Ratio - Urine, Clean Catch  -     Urinalysis With Microscopic - Urine, Clean Catch; Future    8. Annual physical exam  -     TSH Rfx On Abnormal To Free T4; Future  -     CBC & Differential; Future  -     Comprehensive Metabolic Panel; Future  -     Hemoglobin A1c; Future  -     Lipid Panel; Future  -     Vitamin B12; Future  -     Folate; Future  -     IVON With / DsDNA, RNP, Sjogrens A / B, Smith; Future  -     Microalbumin / Creatinine Urine Ratio - Urine, Clean Catch  -     Urinalysis With Microscopic - Urine, Clean Catch; Future    9. Arthralgia, unspecified joint  -     TSH Rfx On Abnormal To Free T4; Future  -     CBC & Differential; Future  -     Comprehensive Metabolic Panel; Future  -     Hemoglobin A1c; Future  -     Lipid Panel; Future  -     Vitamin B12; Future  -     Folate; Future  -     IVON With / DsDNA, RNP,  Sjogrens A / B, Juares; Future  -     Rheumatoid Factor, Quant; Future  -     Microalbumin / Creatinine Urine Ratio - Urine, Clean Catch  -     Urinalysis With Microscopic - Urine, Clean Catch; Future    10. Vaginal discomfort  Comments:  Transvaginal ultrasound ordered  Orders:  -     US Pelvis Transvaginal Non OB; Future      Limited records from the last 1 to 2 years reviewed.    Tobacco Use: High Risk    Smoking Tobacco Use: Every Day    Smokeless Tobacco Use: Never    Passive Exposure: Not on file            Follow Up   Return in about 3 months (around 9/7/2023) for Next scheduled follow up.  Patient was given instructions and counseling regarding her condition or for health maintenance advice. Please see specific information pulled into the AVS if appropriate.

## 2023-10-11 ENCOUNTER — TELEPHONE (OUTPATIENT)
Dept: FAMILY MEDICINE CLINIC | Facility: CLINIC | Age: 63
End: 2023-10-11
Payer: COMMERCIAL

## 2023-10-11 NOTE — TELEPHONE ENCOUNTER
HUB TO RELAY     Attempted to call patient seeing if patient would like to reschedule cancelled appt

## 2023-11-14 ENCOUNTER — HOSPITAL ENCOUNTER (OUTPATIENT)
Dept: ULTRASOUND IMAGING | Facility: HOSPITAL | Age: 63
Discharge: HOME | End: 2023-11-14
Admitting: STUDENT IN AN ORGANIZED HEALTH CARE EDUCATION/TRAINING PROGRAM
Payer: COMMERCIAL

## 2023-11-14 ENCOUNTER — HOSPITAL ENCOUNTER (OUTPATIENT)
Dept: ULTRASOUND IMAGING | Facility: HOSPITAL | Age: 63
Discharge: HOME | End: 2023-11-14
Payer: COMMERCIAL

## 2023-11-14 DIAGNOSIS — R93.89 ABNORMAL TRANSVAGINAL ULTRASOUND: ICD-10-CM

## 2023-11-14 DIAGNOSIS — N94.89 ENDOMETRIAL MASS: Primary | ICD-10-CM

## 2023-11-14 DIAGNOSIS — E04.1 THYROID NODULE: ICD-10-CM

## 2023-11-14 DIAGNOSIS — N94.9 VAGINAL DISCOMFORT: ICD-10-CM

## 2023-11-14 PROCEDURE — 76830 TRANSVAGINAL US NON-OB: CPT

## 2023-11-14 PROCEDURE — 76536 US EXAM OF HEAD AND NECK: CPT

## 2023-11-14 PROCEDURE — 76856 US EXAM PELVIC COMPLETE: CPT

## 2023-11-15 ENCOUNTER — TELEPHONE (OUTPATIENT)
Dept: OBSTETRICS AND GYNECOLOGY | Facility: CLINIC | Age: 63
End: 2023-11-15
Payer: COMMERCIAL

## 2023-12-10 DIAGNOSIS — M25.512 CHRONIC LEFT SHOULDER PAIN: ICD-10-CM

## 2023-12-10 DIAGNOSIS — G89.29 CHRONIC LEFT SHOULDER PAIN: ICD-10-CM

## 2023-12-10 DIAGNOSIS — K21.9 CHRONIC GERD: ICD-10-CM

## 2023-12-11 DIAGNOSIS — M19.90 ARTHRITIS: ICD-10-CM

## 2023-12-11 RX ORDER — METHOCARBAMOL 500 MG/1
500 TABLET, FILM COATED ORAL 3 TIMES DAILY PRN
Qty: 60 TABLET | Refills: 0 | Status: SHIPPED | OUTPATIENT
Start: 2023-12-11

## 2023-12-11 RX ORDER — OMEPRAZOLE 40 MG/1
40 CAPSULE, DELAYED RELEASE ORAL DAILY
Qty: 90 CAPSULE | Refills: 0 | Status: SHIPPED | OUTPATIENT
Start: 2023-12-11

## 2023-12-11 RX ORDER — DULOXETIN HYDROCHLORIDE 60 MG/1
60 CAPSULE, DELAYED RELEASE ORAL DAILY
Qty: 90 CAPSULE | Refills: 0 | Status: SHIPPED | OUTPATIENT
Start: 2023-12-11

## 2023-12-15 ENCOUNTER — HOSPITAL ENCOUNTER (EMERGENCY)
Facility: HOSPITAL | Age: 63
Discharge: HOME | End: 2023-12-16
Attending: EMERGENCY MEDICINE
Payer: COMMERCIAL

## 2023-12-15 ENCOUNTER — APPOINTMENT (OUTPATIENT)
Dept: CT IMAGING | Facility: HOSPITAL | Age: 63
End: 2023-12-15
Payer: COMMERCIAL

## 2023-12-15 ENCOUNTER — APPOINTMENT (OUTPATIENT)
Dept: GENERAL RADIOLOGY | Facility: HOSPITAL | Age: 63
End: 2023-12-15
Payer: COMMERCIAL

## 2023-12-15 DIAGNOSIS — S16.1XXA STRAIN OF NECK MUSCLE, INITIAL ENCOUNTER: Primary | ICD-10-CM

## 2023-12-15 LAB
ALBUMIN SERPL-MCNC: 4.3 G/DL (ref 3.5–5.2)
ALBUMIN/GLOB SERPL: 1.6 G/DL
ALP SERPL-CCNC: 117 U/L (ref 39–117)
ALT SERPL W P-5'-P-CCNC: 16 U/L (ref 1–33)
ANION GAP SERPL CALCULATED.3IONS-SCNC: 11 MMOL/L (ref 5–15)
AST SERPL-CCNC: 17 U/L (ref 1–32)
BASOPHILS # BLD AUTO: 0.05 10*3/MM3 (ref 0–0.2)
BASOPHILS NFR BLD AUTO: 0.4 % (ref 0–1.5)
BILIRUB SERPL-MCNC: <0.2 MG/DL (ref 0–1.2)
BUN SERPL-MCNC: 14 MG/DL (ref 8–23)
BUN/CREAT SERPL: 20.3 (ref 7–25)
CALCIUM SPEC-SCNC: 9.5 MG/DL (ref 8.6–10.5)
CHLORIDE SERPL-SCNC: 102 MMOL/L (ref 98–107)
CO2 SERPL-SCNC: 24 MMOL/L (ref 22–29)
CREAT SERPL-MCNC: 0.69 MG/DL (ref 0.57–1)
DEPRECATED RDW RBC AUTO: 44.6 FL (ref 37–54)
EGFRCR SERPLBLD CKD-EPI 2021: 98.9 ML/MIN/1.73
EOSINOPHIL # BLD AUTO: 0.09 10*3/MM3 (ref 0–0.4)
EOSINOPHIL NFR BLD AUTO: 0.6 % (ref 0.3–6.2)
ERYTHROCYTE [DISTWIDTH] IN BLOOD BY AUTOMATED COUNT: 13.2 % (ref 12.3–15.4)
FLUAV SUBTYP SPEC NAA+PROBE: NOT DETECTED
FLUBV RNA ISLT QL NAA+PROBE: NOT DETECTED
GEN 5 2HR TROPONIN T REFLEX: <6 NG/L
GLOBULIN UR ELPH-MCNC: 2.7 GM/DL
GLUCOSE SERPL-MCNC: 94 MG/DL (ref 65–99)
HCT VFR BLD AUTO: 42.3 % (ref 34–46.6)
HGB BLD-MCNC: 13.6 G/DL (ref 12–15.9)
HOLD SPECIMEN: NORMAL
HOLD SPECIMEN: NORMAL
IMM GRANULOCYTES # BLD AUTO: 0.05 10*3/MM3 (ref 0–0.05)
IMM GRANULOCYTES NFR BLD AUTO: 0.4 % (ref 0–0.5)
LYMPHOCYTES # BLD AUTO: 1.74 10*3/MM3 (ref 0.7–3.1)
LYMPHOCYTES NFR BLD AUTO: 12.3 % (ref 19.6–45.3)
MAGNESIUM SERPL-MCNC: 2 MG/DL (ref 1.6–2.4)
MCH RBC QN AUTO: 29.6 PG (ref 26.6–33)
MCHC RBC AUTO-ENTMCNC: 32.2 G/DL (ref 31.5–35.7)
MCV RBC AUTO: 92.2 FL (ref 79–97)
MONOCYTES # BLD AUTO: 1.12 10*3/MM3 (ref 0.1–0.9)
MONOCYTES NFR BLD AUTO: 7.9 % (ref 5–12)
NEUTROPHILS NFR BLD AUTO: 11.14 10*3/MM3 (ref 1.7–7)
NEUTROPHILS NFR BLD AUTO: 78.4 % (ref 42.7–76)
NRBC BLD AUTO-RTO: 0 /100 WBC (ref 0–0.2)
PLATELET # BLD AUTO: 309 10*3/MM3 (ref 140–450)
PMV BLD AUTO: 9.8 FL (ref 6–12)
POTASSIUM SERPL-SCNC: 4.2 MMOL/L (ref 3.5–5.2)
PROT SERPL-MCNC: 7 G/DL (ref 6–8.5)
RBC # BLD AUTO: 4.59 10*6/MM3 (ref 3.77–5.28)
RSV RNA NPH QL NAA+NON-PROBE: NOT DETECTED
SARS-COV-2 RNA RESP QL NAA+PROBE: NOT DETECTED
SODIUM SERPL-SCNC: 137 MMOL/L (ref 136–145)
TROPONIN T DELTA: NORMAL
TROPONIN T SERPL HS-MCNC: <6 NG/L
WBC NRBC COR # BLD AUTO: 14.19 10*3/MM3 (ref 3.4–10.8)
WHOLE BLOOD HOLD COAG: NORMAL
WHOLE BLOOD HOLD SPECIMEN: NORMAL

## 2023-12-15 PROCEDURE — 36415 COLL VENOUS BLD VENIPUNCTURE: CPT | Performed by: EMERGENCY MEDICINE

## 2023-12-15 PROCEDURE — 71045 X-RAY EXAM CHEST 1 VIEW: CPT

## 2023-12-15 PROCEDURE — 83735 ASSAY OF MAGNESIUM: CPT | Performed by: EMERGENCY MEDICINE

## 2023-12-15 PROCEDURE — 93005 ELECTROCARDIOGRAM TRACING: CPT | Performed by: EMERGENCY MEDICINE

## 2023-12-15 PROCEDURE — 25510000001 IOPAMIDOL PER 1 ML: Performed by: EMERGENCY MEDICINE

## 2023-12-15 PROCEDURE — 99285 EMERGENCY DEPT VISIT HI MDM: CPT

## 2023-12-15 PROCEDURE — 87637 SARSCOV2&INF A&B&RSV AMP PRB: CPT | Performed by: EMERGENCY MEDICINE

## 2023-12-15 PROCEDURE — 70450 CT HEAD/BRAIN W/O DYE: CPT

## 2023-12-15 PROCEDURE — 85025 COMPLETE CBC W/AUTO DIFF WBC: CPT | Performed by: EMERGENCY MEDICINE

## 2023-12-15 PROCEDURE — 70491 CT SOFT TISSUE NECK W/DYE: CPT

## 2023-12-15 PROCEDURE — 80053 COMPREHEN METABOLIC PANEL: CPT | Performed by: EMERGENCY MEDICINE

## 2023-12-15 PROCEDURE — 84484 ASSAY OF TROPONIN QUANT: CPT | Performed by: EMERGENCY MEDICINE

## 2023-12-15 RX ORDER — SODIUM CHLORIDE 0.9 % (FLUSH) 0.9 %
10 SYRINGE (ML) INJECTION AS NEEDED
Status: DISCONTINUED | OUTPATIENT
Start: 2023-12-15 | End: 2023-12-16 | Stop reason: HOSPADM

## 2023-12-15 RX ORDER — ASPIRIN 81 MG/1
324 TABLET, CHEWABLE ORAL ONCE
Status: DISCONTINUED | OUTPATIENT
Start: 2023-12-15 | End: 2023-12-16

## 2023-12-15 RX ADMIN — IOPAMIDOL 75 ML: 755 INJECTION, SOLUTION INTRAVENOUS at 22:53

## 2023-12-15 NOTE — ED PROVIDER NOTES
Time: 6:50 PM EST  Date of encounter:  12/15/2023  Independent Historian/Clinical History and Information was obtained by:   Patient    History is limited by: N/A    Chief Complaint   Patient presents with    URI    Neck Pain         History of Present Illness:  Patient is a 63 y.o. year old female who presents to the emergency department for evaluation of chest tightness that radiates to her left shoulder that began 2 days ago.  Patient states she was diagnosed with a sinus infection 1 week ago and placed on antibiotic.  Patient denies shortness of air at this time. Patient was seen by provider in triage, Handy Denise PA-C    Also reports concern of a lump in the left side of her neck, reports history of thyroid nodules.  Patient denies fevers, reports her sinus infection symptoms have improved but reports worsening pain in the left side of her neck and shoulder that radiates up to the back of her head, causing her to have headaches.    Patient Care Team  Primary Care Provider: Pavan Lee MD    Past Medical History:     Allergies   Allergen Reactions    Naproxen Palpitations and Unknown - Low Severity    Nsaids Palpitations     Past Medical History:   Diagnosis Date    Arthritis     Asthma     Chronic allergic rhinitis     COPD (chronic obstructive pulmonary disease)     GERD (gastroesophageal reflux disease)     Headache     High cholesterol     Hyperlipemia     Limb pain     Limb swelling     Myofascial muscle pain     Recurrent sinus infections     Jaden's edema of vocal folds     SOB (shortness of breath)     Thyroid nodule     Tinnitus, bilateral     Tobacco abuse     Voice hoarseness      Past Surgical History:   Procedure Laterality Date     SECTION      TUBAL ABDOMINAL LIGATION      UTERINE FIBROID EMBOLIZATION       Family History   Problem Relation Age of Onset    Arthritis Father     Breast cancer Sister 20    Colon cancer Maternal Grandmother 30    Diabetes Other     Breast cancer Other         Home Medications:  Prior to Admission medications    Medication Sig Start Date End Date Taking? Authorizing Provider   albuterol sulfate  (90 Base) MCG/ACT inhaler Inhale 2 puffs Every 4 (Four) Hours As Needed for Shortness of Air. 2/3/23   Jane Engle APRN   aspirin 325 MG tablet aspirin 325 mg oral tablet take 1 tablet (325 mg) by oral route once daily   Active    Provider, MD See   atorvastatin (Lipitor) 10 MG tablet Take 1 tablet by mouth Daily. 2/3/23   Jane Engle APRN   cetirizine (zyrTEC) 10 MG tablet Take 1 tablet by mouth Daily. FOR 30 DAYS 2/3/23   Jane Engle APRN   DULoxetine (CYMBALTA) 60 MG capsule Take 1 capsule by mouth once daily 12/11/23   Pavan Lee MD   Fluticasone-Umeclidin-Vilant (Trelegy Ellipta) 100-62.5-25 MCG/ACT inhaler Inhale 1 puff Daily. 8/5/23   Alison Engle APRN   lidocaine (LIDODERM) 5 % Place 1 patch on the skin as directed by provider Daily. Remove & Discard patch within 12 hours or as directed by MD 3/3/22   Prudencio Tiwari PA   methocarbamol (ROBAXIN) 500 MG tablet Take 1 tablet by mouth three times daily as needed for muscle spasm 12/11/23   Pavan Lee MD   montelukast (SINGULAIR) 10 MG tablet Take 1 tablet by mouth Every Evening. 9/27/22   Jane Engle APRN   nicotine polacrilex (NICORETTE) 4 MG gum Chew 1 each As Needed for Smoking Cessation. chew 1 piece of gum (4 mg) by oral route every 2 hours as needed and as directed 9/27/22   Jane Engle APRN   omeprazole (priLOSEC) 40 MG capsule Take 1 capsule by mouth once daily 12/11/23   Pavan Lee MD        Social History:   Social History     Tobacco Use    Smoking status: Every Day     Packs/day: .25     Types: Cigarettes     Passive exposure: Never    Smokeless tobacco: Never   Vaping Use    Vaping Use: Never used   Substance Use Topics    Alcohol use: Not Currently     Comment: rarely     Drug use: Never         Review of  "Systems:  Review of Systems   Constitutional:  Negative for fever.   HENT:  Positive for congestion. Negative for sore throat.    Eyes: Negative.    Respiratory:  Positive for chest tightness. Negative for cough and shortness of breath.    Cardiovascular:  Negative for chest pain.   Gastrointestinal:  Negative for abdominal pain, diarrhea and vomiting.   Genitourinary:  Negative for dysuria.   Musculoskeletal:  Positive for myalgias and neck pain.   Skin:  Negative for rash.   Allergic/Immunologic: Negative.    Neurological:  Positive for headaches. Negative for weakness and numbness.   Hematological: Negative.    Psychiatric/Behavioral: Negative.     All other systems reviewed and are negative.       Physical Exam:  /70   Pulse 80   Temp 98.7 °F (37.1 °C) (Oral)   Resp 16   Ht 157.5 cm (62\")   Wt 92 kg (202 lb 13.2 oz)   SpO2 100%   BMI 37.10 kg/m²         Physical Exam  Vitals and nursing note reviewed.   Constitutional:       General: She is not in acute distress.     Appearance: Normal appearance. She is not toxic-appearing.   HENT:      Head: Normocephalic and atraumatic.      Jaw: There is normal jaw occlusion.      Nose: Congestion present.      Mouth/Throat:      Mouth: Mucous membranes are moist.   Eyes:      General: Lids are normal.      Extraocular Movements: Extraocular movements intact.      Conjunctiva/sclera: Conjunctivae normal.      Pupils: Pupils are equal, round, and reactive to light.   Neck:      Trachea: Trachea normal.   Cardiovascular:      Rate and Rhythm: Normal rate and regular rhythm.      Pulses: Normal pulses.      Heart sounds: Normal heart sounds.   Pulmonary:      Effort: Pulmonary effort is normal. No respiratory distress.      Breath sounds: Normal breath sounds. No wheezing or rhonchi.   Abdominal:      General: Abdomen is flat. There is no distension.      Palpations: Abdomen is soft.      Tenderness: There is no abdominal tenderness. There is no guarding or " rebound.   Musculoskeletal:         General: Normal range of motion.      Left shoulder: Tenderness present. No swelling. Normal pulse.      Cervical back: Normal range of motion and neck supple. Tenderness present. No bony tenderness. Pain with movement and muscular tenderness present. No spinous process tenderness.      Right lower leg: No edema.      Left lower leg: No edema.   Lymphadenopathy:      Cervical: Cervical adenopathy present.   Skin:     General: Skin is warm and dry.   Neurological:      General: No focal deficit present.      Mental Status: She is alert and oriented to person, place, and time. Mental status is at baseline.   Psychiatric:         Mood and Affect: Mood normal.         Behavior: Behavior normal.             Procedures:  Procedures      Medical Decision Making:      Comorbidities that affect care:    Recurrent sinus infections, Asthma, COPD, Smoking, Thyroid Disease    External Notes reviewed:    Previous Clinic Note: Urgent care visit from 12/2/2023 where she was diagnosed with acute bacterial rhinosinusitis and was started on Levaquin      The following orders were placed and all results were independently analyzed by me:  Orders Placed This Encounter   Procedures    COVID PRE-OP / PRE-PROCEDURE SCREENING ORDER (NO ISOLATION) - Swab, Nasopharynx    COVID-19, FLU A/B, RSV PCR 1 HR TAT - Swab, Nasopharynx    XR Chest 1 View    CT Soft Tissue Neck With Contrast    CT Head Without Contrast    [place] Draw    Comprehensive Metabolic Panel    High Sensitivity Troponin T    Magnesium    CBC Auto Differential    High Sensitivity Troponin T 2Hr    Continuous Pulse Oximetry    Vital Signs    Oxygen Therapy- Nasal Cannula; Titrate 1-6 LPM Per SpO2; 90 - 95%    ECG 12 Lead Chest Pain    ECG 12 Lead Chest Pain    Insert Peripheral IV    CBC & Differential    Green Top (Gel)    Lavender Top    Gold Top - SST    Light Blue Top       Medications Given in the Emergency Department:  Medications    sodium chloride 0.9 % flush 10 mL (has no administration in time range)   aspirin chewable tablet 324 mg (has no administration in time range)   iopamidol (ISOVUE-370) 76 % injection 75 mL (75 mL Intravenous Given 12/15/23 2253)        ED Course:    The patient was initially evaluated in the triage area where orders were placed. The patient was later dispositioned by EDDIE Mon.      The patient was advised to stay for completion of workup which includes but is not limited to communication of labs and radiological results, reassessment and plan. The patient was advised that leaving prior to disposition by a provider could result in critical findings that are not communicated to the patient.     ED Course as of 12/16/23 0045   Fri Dec 15, 2023   1850 Provider In Triage: Patient was evaluated by me in triage, Handy Denise PA-C. Orders were placed and the patient is currently awaiting final results and disposition.   [SK]      ED Course User Index  [SK] Handy Denise PA-C       Labs:    Lab Results (last 24 hours)       Procedure Component Value Units Date/Time    CBC & Differential [[id number]]  (Abnormal) Collected: 12/15/23 1852    Specimen: Blood from Arm, Left Updated: 12/15/23 1902    Narrative:      The following orders were created for panel order CBC & Differential.  Procedure                               Abnormality         Status                     ---------                               -----------         ------                     CBC Auto Differential[[id number]]        Abnormal            Final result                 Please view results for these tests on the individual orders.    Comprehensive Metabolic Panel [[id number]] Collected: 12/15/23 1852    Specimen: Blood from Arm, Left Updated: 12/15/23 1922     Glucose 94 mg/dL      BUN 14 mg/dL      Creatinine 0.69 mg/dL      Sodium 137 mmol/L      Potassium 4.2 mmol/L      Chloride 102 mmol/L      CO2 24.0 mmol/L      Calcium 9.5 mg/dL       Total Protein 7.0 g/dL      Albumin 4.3 g/dL      ALT (SGPT) 16 U/L      AST (SGOT) 17 U/L      Alkaline Phosphatase 117 U/L      Total Bilirubin <0.2 mg/dL      Globulin 2.7 gm/dL      A/G Ratio 1.6 g/dL      BUN/Creatinine Ratio 20.3     Anion Gap 11.0 mmol/L      eGFR 98.9 mL/min/1.73     Narrative:      GFR Normal >60  Chronic Kidney Disease <60  Kidney Failure <15      High Sensitivity Troponin T [[id number]]  (Normal) Collected: 12/15/23 1852    Specimen: Blood from Arm, Left Updated: 12/15/23 1920     HS Troponin T <6 ng/L     Narrative:      High Sensitive Troponin T Reference Range:  <14.0 ng/L- Negative Female for AMI  <22.0 ng/L- Negative Male for AMI  >=14 - Abnormal Female indicating possible myocardial injury.  >=22 - Abnormal Male indicating possible myocardial injury.   Clinicians would have to utilize clinical acumen, EKG, Troponin, and serial changes to determine if it is an Acute Myocardial Infarction or myocardial injury due to an underlying chronic condition.         Magnesium [[id number]]  (Normal) Collected: 12/15/23 1852    Specimen: Blood from Arm, Left Updated: 12/15/23 1922     Magnesium 2.0 mg/dL     CBC Auto Differential [[id number]]  (Abnormal) Collected: 12/15/23 1852    Specimen: Blood from Arm, Left Updated: 12/15/23 1902     WBC 14.19 10*3/mm3      RBC 4.59 10*6/mm3      Hemoglobin 13.6 g/dL      Hematocrit 42.3 %      MCV 92.2 fL      MCH 29.6 pg      MCHC 32.2 g/dL      RDW 13.2 %      RDW-SD 44.6 fl      MPV 9.8 fL      Platelets 309 10*3/mm3      Neutrophil % 78.4 %      Lymphocyte % 12.3 %      Monocyte % 7.9 %      Eosinophil % 0.6 %      Basophil % 0.4 %      Immature Grans % 0.4 %      Neutrophils, Absolute 11.14 10*3/mm3      Lymphocytes, Absolute 1.74 10*3/mm3      Monocytes, Absolute 1.12 10*3/mm3      Eosinophils, Absolute 0.09 10*3/mm3      Basophils, Absolute 0.05 10*3/mm3      Immature Grans, Absolute 0.05 10*3/mm3      nRBC 0.0 /100 WBC     COVID PRE-OP / PRE-PROCEDURE  SCREENING ORDER (NO ISOLATION) - Swab, Nasopharynx [[id number]]  (Normal) Collected: 12/15/23 1929    Specimen: Swab from Nasopharynx Updated: 12/15/23 2016    Narrative:      The following orders were created for panel order COVID PRE-OP / PRE-PROCEDURE SCREENING ORDER (NO ISOLATION) - Swab, Nasopharynx.  Procedure                               Abnormality         Status                     ---------                               -----------         ------                     COVID-19, FLU A/B, RSV P...[[id number]]  Normal              Final result                 Please view results for these tests on the individual orders.    COVID-19, FLU A/B, RSV PCR 1 HR TAT - Swab, Nasopharynx [[id number]]  (Normal) Collected: 12/15/23 1929    Specimen: Swab from Nasopharynx Updated: 12/15/23 2016     COVID19 Not Detected     Influenza A PCR Not Detected     Influenza B PCR Not Detected     RSV, PCR Not Detected    Narrative:      Fact sheet for providers: https://www.fda.gov/media/796802/download    Fact sheet for patients: https://www.fda.gov/media/567437/download    Test performed by PCR.    High Sensitivity Troponin T 2Hr [[id number]] Collected: 12/15/23 2239    Specimen: Blood Updated: 12/15/23 2309     HS Troponin T <6 ng/L      Troponin T Delta --     Comment: Unable to calculate.       Narrative:      High Sensitive Troponin T Reference Range:  <14.0 ng/L- Negative Female for AMI  <22.0 ng/L- Negative Male for AMI  >=14 - Abnormal Female indicating possible myocardial injury.  >=22 - Abnormal Male indicating possible myocardial injury.   Clinicians would have to utilize clinical acumen, EKG, Troponin, and serial changes to determine if it is an Acute Myocardial Infarction or myocardial injury due to an underlying chronic condition.                  Imaging:    CT Soft Tissue Neck With Contrast    Result Date: 12/16/2023  PROCEDURE: CT SOFT TISSUE NECK W CONTRAST  COMPARISONS: 12/15/2023; 11/14/2023; 11/3/2022; 11/1/2010.   INDICATIONS: 63-year-old female w/ unspecified neck mass, headache, dysphagia, & left-sided neck pain.  PROTOCOL:   Standard CT imaging protocol performed.    RADIATION:   Total DLP: 440.2 mGy*cm.   Automated exposure control was utilized to minimize radiation dose. CONTRAST: 75 mL Isovue 370 I.V.  TECHNIQUE: After obtaining the patient's consent, 390 CT images were obtained with non-ionic intravenous contrast material.   FINDINGS:  There may be mild narrowing of the nasopharyngeal, which is probably related to lymphoid hyperplasia and/or body habitus.  No tonsillar or peritonsillar abscess is seen.  No enlarged cervical lymph nodes are identified.  No neck masses are appreciated.  No acute sialadenitis or parotitis.  No sialolithiasis or sialodocholithiasis.  The epiglottis is within normal limits.  No ectopic gas or fluid collections are suggested.  No acute abnormality of the orbits or partially imaged brain.  The visualized paranasal sinuses are well aerated with minimal if any mucosal thickening.  No air-fluid levels are seen within the imaged paranasal sinuses.  The middle ear clefts are well aerated.  A diffusely enlarged thyroid gland is suggested, as described in the recent thyroid ultrasound study from 11/14/2023.  There may be mild emphysematous changes of the lung apices.  No definite acute infiltrate is seen.  No definite suspicious pulmonary nodule is appreciated within the partially imaged lungs.  Mildly prominent bilateral hilar lymph nodes are suspected.  The pulmonary arabella are only partially imaged on the exam.  Please correlate with possible history of pulmonary sarcoidosis.  Degenerative changes are seen throughout the imaged spine, as detailed in the cervical spine MRI exam report from 8/3/2022.  Mild arterial calcifications are seen, especially involving the aortic arch and the proximal left internal carotid artery (ICA).  No definite acute findings are seen otherwise.        No enlarged  cervical lymph nodes are seen.  No tonsillar or peritonsillar abscess is seen.  No neck mass is seen.  Mild prominence of the nasopharyngeal mucosal space is noted with mild narrowing of the airway.  The findings may be related to body habitus.  Lymphoid hyperplasia is also possible.  A similar CT appearance was seen on the prior 11/1/2010 neck CT exam.  The other findings are as detailed above.   Please note that portions of this note were completed with a voice recognition program.  FAWN FOURNIER JR, MD       Electronically Signed and Approved By: FAWN FOURNIER JR, MD on 12/16/2023 at 0:29             CT Head Without Contrast    Result Date: 12/16/2023  PROCEDURE: CT HEAD WO CONTRAST  COMPARISON: 3/3/2022.  INDICATIONS: headache  PROTOCOL:   Standard CT imaging protocol performed.    RADIATION:   Total DLP: 952 mGy*cm.   MA and/or KV were/was adjusted to minimize radiation dose.    TECHNIQUE: After obtaining the patient's consent, 123 CT images were obtained without non-ionic intravenous contrast material.  DISCUSSION:   A routine nonenhanced head CT was performed.  No acute brain abnormality is identified.  No acute intracranial hemorrhage.  No acute infarct is seen.  No acute skull fracture.  No midline shift or acute intracranial mass effect is seen.  The ventricular size and configuration are within normal limits.  No significant acute findings are seen with regard to the imaged orbits or middle ear clefts.  There may be an incidental benign 4 mm pineal cyst.  Prominent anterior interhemispheric parafalcine dural ossification/mineralization is seen, measuring about 1 cm, as on image 27 of series 201 and adjacent images.  It is unchanged and of doubtful clinical significance.  There may be mild cerebellar tonsillar ectopia.  Minimal age-indeterminate mucosal thickening involves the imaged paranasal sinuses.  No air-fluid interfaces are seen within the imaged paranasal sinuses.  Minimal arterial calcification  is possible.       No acute brain abnormality is seen. Specifically, no acute intracranial hemorrhage, no acute infarct, no significant intracranial mass lesion, and no acute intracranial mass effect are appreciated.  Please see above comments for further detail.    Please note that portions of this note were completed with a voice recognition program.  FAWN FOURNIER JR, MD       Electronically Signed and Approved By: FAWN FOURNIER JR, MD on 12/16/2023 at 0:15              XR Chest 1 View    Result Date: 12/15/2023  PROCEDURE: XR CHEST 1 VW  COMPARISON: [facility], CR, CHEST PA/AP & LAT 2V, 5/05/2021, 10:22.  INDICATIONS: PAIN IN LEFT LATERAL NECK. HEADACHE.  FINDINGS:  The heart is normal in size.  The lungs are well-expanded and free of infiltrates.  Bony structures appear intact.       No active disease is seen.       GILES CHAMPION MD       Electronically Signed and Approved By: GILES CHAMPION MD on 12/15/2023 at 20:02                Differential Diagnosis and Discussion:      Headache: Differential diagnosis includes but is not limited to migraine, cluster headache, hypertension, tumor, subarachnoid bleeding, pseudotumor cerebri, temporal arteritis, infections, tension headache, and TMJ syndrome.  Neck Pain: The patient presents with neck pain. My differential diagnosis includes but is not limited to acute spinal epidural abscess, acute spinal epidural bleed, meningitis, musculoskeletal neck pain, spinal fracture, and osteoarthritis.     All labs were reviewed and interpreted by me.  All X-rays impressions were independently interpreted by me.  EKG was interpreted by supervising attending.  CT scan radiology impression was interpreted by me.    MDM     Amount and/or Complexity of Data Reviewed  Clinical lab tests: reviewed  Tests in the radiology section of CPT®: reviewed  Tests in the medicine section of CPT®: reviewed    The patient presented to the emergency department with a  headache. The patient is now resting comfortably in feels better, is alert, talkative, interactive and in no distress after ED treatment. The patient appears well and is able to tolerate PO fluids. Repeat examination is unremarkable and benign. The patient is neurologically intact, has a normal mental status, and this ambulatory in the ED. The history, exam, diagnostic testing (if any) and the patient's current condition do not suggest meningitis, stroke, sepsis, subarachnoid hemorrhage, intracranial bleeding, encephalitis, temporal arteritis or other significant pathology to warrant further testing, continued ED treatment, admission, neurological consultation, for other specialist evaluation at this point. The vital signs have been stable. The patient's condition is stable and appropriate for discharge. The patient will pursue further outpatient evaluation with the primary care physician or other designated or consulting physician as indicated in the discharge instructions.  Patient's signs and symptoms are consistent with a muscle strain of the neck and shoulder.  Will provide patient with neck stretches, recommended to apply heat to the sore areas 3-4 times per day.  Patient reports she has a sensitivity to oral NSAIDs but is willing to try topical diclofenac.          Patient Care Considerations:    ANTIBIOTICS: I considered prescribing antibiotics as an outpatient however no bacterial focus of infection was found.      Consultants/Shared Management Plan:    None    Social Determinants of Health:    Patient is independent, reliable, and has access to care.       Disposition and Care Coordination:    Discharged: The patient is suitable and stable for discharge with no need for consideration of observation or admission.    I have explained the patient´s condition, diagnoses and treatment plan based on the information available to me at this time. I have answered questions and addressed any concerns. The patient  has a good  understanding of the patient´s diagnosis, condition, and treatment plan as can be expected at this point. The vital signs have been stable. The patient´s condition is stable and appropriate for discharge from the emergency department.      The patient will pursue further outpatient evaluation with the primary care physician or other designated or consulting physician as outlined in the discharge instructions. They are agreeable to this plan of care and follow-up instructions have been explained in detail. The patient has received these instructions in written format and have expressed an understanding of the discharge instructions. The patient is aware that any significant change in condition or worsening of symptoms should prompt an immediate return to this or the closest emergency department or call to 1.  I have explained discharge medications and the need for follow up with the patient/caretakers. This was also printed in the discharge instructions. Patient was discharged with the following medications and follow up:      Medication List        New Prescriptions      Diclofenac Sodium 1 % gel gel  Commonly known as: VOLTAREN  Apply 4 g topically to the appropriate area as directed 4 (Four) Times a Day As Needed (muscular pain).               Where to Get Your Medications        These medications were sent to [facility] [address] [place] [address] - [address] [facility] [phone] [facility] [phone]   [address] [facility] [address]      Phone: [phone]   Diclofenac Sodium 1 % gel gel      Pavan Lee MD  [address]  [facility] [address]  [facility] [address]  [phone]    Call in 2 days         Final diagnoses:   Strain of neck muscle, initial encounter        ED Disposition       ED Disposition   Discharge    Condition   Stable    Comment   --               This medical record created using voice recognition software.             Patricia Pichardo, EDDIE  12/16/23 0045

## 2023-12-16 VITALS
TEMPERATURE: 98.7 F | HEART RATE: 78 BPM | DIASTOLIC BLOOD PRESSURE: 70 MMHG | HEIGHT: 62 IN | SYSTOLIC BLOOD PRESSURE: 140 MMHG | RESPIRATION RATE: 16 BRPM | OXYGEN SATURATION: 97 % | BODY MASS INDEX: 37.32 KG/M2 | WEIGHT: 202.82 LBS

## 2023-12-16 LAB
QT INTERVAL: 362 MS
QT INTERVAL: 386 MS
QTC INTERVAL: 414 MS
QTC INTERVAL: 419 MS

## 2023-12-16 NOTE — DISCHARGE INSTRUCTIONS
Apply heat to the sore areas 3-4 times per day.  You may also try applying the diclofenac topical gel to help with the pain.  Due to the neck stretches and exercises 3 times per day.  Please follow-up with your primary care provider.

## 2023-12-20 ENCOUNTER — TELEPHONE (OUTPATIENT)
Dept: FAMILY MEDICINE CLINIC | Facility: CLINIC | Age: 63
End: 2023-12-20
Payer: COMMERCIAL

## 2023-12-20 NOTE — TELEPHONE ENCOUNTER
Unable to lvm, vmb full.    Patient missed their appointment scheduled on 12/19/2023 @1130am with .    Would patient like to be rescheduled?    No show letter sent to patient either via [employer] or mail.     HUB TO SHARE AND RELAY

## 2024-01-30 ENCOUNTER — TELEPHONE (OUTPATIENT)
Dept: OTOLARYNGOLOGY | Facility: CLINIC | Age: 64
End: 2024-01-30

## 2024-01-30 NOTE — TELEPHONE ENCOUNTER
Provider: DR ANGUIANO    Caller: MAHNAZ PEREZ    Relationship to Patient: SELF      Reason for Call: PT IS HAVING NECK, EAR, AND THROAT PAIN ON THE LEFT SIDE OF HEAD.  PT FEELS PAIN IS GETTING WORSE.  IT IS STARTING TO GO INTO THE JAW AND JAW POINT, WITH SHOOTING PAINS IN EAR.    PT WLD LIKE TO BE SEEN ASAP,  NO AVAILABILITY UNTIL APRIL.    When was the patient last seen: 1/13/23    When did it start: FOR ABOUT 3MTHS, WORSENED WITHIN THE LAST WEEK    Where is it located: LEFT SIDE, NECK, JAW    Characteristics of symptom/severity: 8    Timing- Is it constant or intermittent: CONSTANT    What makes it worse: LAYING A CERTAIN OR MOVING JAW    What makes it better: TYLENOL EASES PAIN BUT DOES NOT HELP    What therapies/medications have you tried: TYNLENOL

## 2024-01-31 DIAGNOSIS — R93.89 ABNORMAL TRANSVAGINAL ULTRASOUND: ICD-10-CM

## 2024-01-31 DIAGNOSIS — N94.89 ENDOMETRIAL MASS: Primary | ICD-10-CM

## 2024-02-19 ENCOUNTER — TELEPHONE (OUTPATIENT)
Dept: OBSTETRICS AND GYNECOLOGY | Facility: CLINIC | Age: 64
End: 2024-02-19
Payer: COMMERCIAL

## 2024-03-14 ENCOUNTER — HOSPITAL ENCOUNTER (OUTPATIENT)
Dept: MRI IMAGING | Facility: HOSPITAL | Age: 64
Discharge: HOME | End: 2024-03-14
Admitting: STUDENT IN AN ORGANIZED HEALTH CARE EDUCATION/TRAINING PROGRAM
Payer: COMMERCIAL

## 2024-03-14 DIAGNOSIS — N94.89 ENDOMETRIAL MASS: ICD-10-CM

## 2024-03-14 DIAGNOSIS — R93.89 ABNORMAL TRANSVAGINAL ULTRASOUND: ICD-10-CM

## 2024-03-14 LAB
CREAT BLDA-MCNC: 1 MG/DL (ref 0.6–1.3)
EGFRCR SERPLBLD CKD-EPI 2021: 63.8 ML/MIN/1.73

## 2024-03-14 PROCEDURE — 82565 ASSAY OF CREATININE: CPT

## 2024-03-14 PROCEDURE — 72197 MRI PELVIS W/O & W/DYE: CPT

## 2024-03-14 PROCEDURE — 0 GADOBENATE DIMEGLUMINE 529 MG/ML SOLUTION: Performed by: STUDENT IN AN ORGANIZED HEALTH CARE EDUCATION/TRAINING PROGRAM

## 2024-03-14 PROCEDURE — A9577 INJ MULTIHANCE: HCPCS | Performed by: STUDENT IN AN ORGANIZED HEALTH CARE EDUCATION/TRAINING PROGRAM

## 2024-03-14 RX ADMIN — GADOBENATE DIMEGLUMINE 20 ML: 529 INJECTION, SOLUTION INTRAVENOUS at 15:53

## 2024-03-15 ENCOUNTER — TELEPHONE (OUTPATIENT)
Dept: OBSTETRICS AND GYNECOLOGY | Facility: CLINIC | Age: 64
End: 2024-03-15
Payer: COMMERCIAL

## 2024-03-15 DIAGNOSIS — N94.89 ENDOMETRIAL MASS: Primary | ICD-10-CM

## 2024-03-15 DIAGNOSIS — R93.89 ABNORMAL MRI: ICD-10-CM

## 2024-03-27 ENCOUNTER — TELEPHONE (OUTPATIENT)
Dept: FAMILY MEDICINE CLINIC | Facility: CLINIC | Age: 64
End: 2024-03-27

## 2024-03-27 NOTE — TELEPHONE ENCOUNTER
[facility]    Patient missed their appointment scheduled on 3/27/24 with .    Would patient like to be rescheduled?    No show letter sent to patient either via Snapflowhart or mail.     HUB TO SHARE AND RELAY

## 2024-04-08 ENCOUNTER — TELEPHONE (OUTPATIENT)
Dept: OBSTETRICS AND GYNECOLOGY | Facility: CLINIC | Age: 64
End: 2024-04-08
Payer: COMMERCIAL

## 2024-04-17 ENCOUNTER — TELEPHONE (OUTPATIENT)
Dept: FAMILY MEDICINE CLINIC | Facility: CLINIC | Age: 64
End: 2024-04-17
Payer: COMMERCIAL

## 2024-04-17 NOTE — TELEPHONE ENCOUNTER
HUB TO RELAY:    Patient needing schedule for annual physical appointment. Please schedule appointment.

## 2024-04-19 NOTE — PROGRESS NOTES
"GYN Visit    Chief Complaint   Patient presents with    Pelvic Mass     Hx of fibroids, MRI done 3/14/24       HPI:   64 y.o. here for endometrial mass on sono and MRI.  Pt is not having any bleeding and has not had bleeding x several years.  Pt states she had some kind of procedure on her uterus many years ago where they removed some kind of mass vaginally.  She does not know exactly what it was but she states they told her they were not able to get everything but it was benign.  Pt states she is having some lower abdominal cramping with heaviness like she is going to start a period but has not had any bleeding.  She was referred here for evaluation.      History: PMHx, Meds, Allergies, PSHx, Social Hx, and POBHx all reviewed and updated.    PHYSICAL EXAM:  /76   Pulse 85   Ht 157.5 cm (62\")   Wt 92.8 kg (204 lb 9.6 oz)   LMP  (LMP Unknown) Comment: No menses since   Breastfeeding No   BMI 37.42 kg/m² Chaperone present  General- NAD, alert and oriented, appropriate  Psych- Normal mood, good memory        External genitalia- Normal female, no lesions  Urethra/meatus- Normal, no masses, non tender, no prolapse  Bladder- Normal, no masses, non tender, no prolapse  Vagina- Normal, no atrophy, no lesions, no discharge, no prolapse  Cvx- Normal, no lesions, no discharge, No cervical motion tenderness  Uterus- Normal size, shape & consistency.  Non tender, mobile.  Adnexa- No mass, non tender  Anus/Rectum/Perineum- Not performed    Lymphatic- No palpable neck, axillary, or groin nodes  Ext- No edema, no cyanosis    Skin- No lesions, no rashes, no acanthosis nigricans      US Pelvis Transvaginal Non OB (2023 13:03)    MRI Pelvis With & Without Contrast (2024 16:06)    ASSESSMENT AND PLAN:  Diagnoses and all orders for this visit:    1. Endometrial mass (Primary)  -     Case Request; Standing  -     Case Request    Other orders  -     Place Sequential Compression Device; Standing  -     " Maintain Sequential Compression Device; Standing    Hysteroscopy D&C: The patient was counseled to the risks and benefits of this procedure to include infection, bleeding, damage to internal organs, bowels, bladder, blood vessels, other internal organs requiring additional surgery to repair or remove any damaged structures.  .  The patient was given the opportunity to ask questions and her questions were answered to her satisfaction.         Follow Up:  No follow-ups on file.          Dominique Beaver DO  04/22/2024    [facility]  [address] [place] [address]  Dept: [phone]  Dept Fax: [phone]  Loc: [phone]  Loc Fax: [phone]

## 2024-04-22 ENCOUNTER — OFFICE VISIT (OUTPATIENT)
Dept: OBSTETRICS AND GYNECOLOGY | Facility: CLINIC | Age: 64
End: 2024-04-22
Payer: COMMERCIAL

## 2024-04-22 VITALS
SYSTOLIC BLOOD PRESSURE: 117 MMHG | DIASTOLIC BLOOD PRESSURE: 76 MMHG | BODY MASS INDEX: 37.65 KG/M2 | HEART RATE: 85 BPM | WEIGHT: 204.6 LBS | HEIGHT: 62 IN

## 2024-04-22 DIAGNOSIS — N94.89 ENDOMETRIAL MASS: Primary | ICD-10-CM

## 2024-04-22 PROCEDURE — 1159F MED LIST DOCD IN RCRD: CPT | Performed by: OBSTETRICS & GYNECOLOGY

## 2024-04-22 PROCEDURE — 1160F RVW MEDS BY RX/DR IN RCRD: CPT | Performed by: OBSTETRICS & GYNECOLOGY

## 2024-04-22 PROCEDURE — 99204 OFFICE O/P NEW MOD 45 MIN: CPT | Performed by: OBSTETRICS & GYNECOLOGY

## 2024-04-23 ENCOUNTER — PATIENT ROUNDING (BHMG ONLY) (OUTPATIENT)
Dept: OBSTETRICS AND GYNECOLOGY | Facility: CLINIC | Age: 64
End: 2024-04-23
Payer: COMMERCIAL

## 2024-04-26 DIAGNOSIS — Z72.0 TOBACCO ABUSE: ICD-10-CM

## 2024-04-26 DIAGNOSIS — J30.89 ENVIRONMENTAL AND SEASONAL ALLERGIES: ICD-10-CM

## 2024-04-26 DIAGNOSIS — M19.90 ARTHRITIS: ICD-10-CM

## 2024-04-26 DIAGNOSIS — K21.9 CHRONIC GERD: ICD-10-CM

## 2024-04-26 DIAGNOSIS — R06.02 SHORTNESS OF BREATH: ICD-10-CM

## 2024-04-26 RX ORDER — LIDOCAINE 50 MG/G
1 PATCH TOPICAL EVERY 24 HOURS
Qty: 30 EACH | Refills: 0 | Status: SHIPPED | OUTPATIENT
Start: 2024-04-26

## 2024-04-26 RX ORDER — DULOXETIN HYDROCHLORIDE 60 MG/1
60 CAPSULE, DELAYED RELEASE ORAL DAILY
Qty: 90 CAPSULE | Refills: 0 | Status: SHIPPED | OUTPATIENT
Start: 2024-04-26

## 2024-04-26 RX ORDER — CETIRIZINE HYDROCHLORIDE 10 MG/1
10 TABLET ORAL DAILY
Qty: 90 TABLET | Refills: 1 | Status: SHIPPED | OUTPATIENT
Start: 2024-04-26

## 2024-04-26 RX ORDER — MONTELUKAST SODIUM 10 MG/1
10 TABLET ORAL EVERY EVENING
Qty: 90 TABLET | Refills: 1 | Status: SHIPPED | OUTPATIENT
Start: 2024-04-26

## 2024-04-26 RX ORDER — ALBUTEROL SULFATE 90 UG/1
2 AEROSOL, METERED RESPIRATORY (INHALATION) EVERY 4 HOURS PRN
Qty: 6.7 G | Refills: 2 | Status: SHIPPED | OUTPATIENT
Start: 2024-04-26

## 2024-04-26 RX ORDER — OMEPRAZOLE 40 MG/1
40 CAPSULE, DELAYED RELEASE ORAL DAILY
Qty: 90 CAPSULE | Refills: 0 | Status: SHIPPED | OUTPATIENT
Start: 2024-04-26

## 2024-04-26 NOTE — TELEPHONE ENCOUNTER
Caller: Autumn Muñoz    Relationship: Self    Best call back number: [phone]     Requested Prescriptions:   Requested Prescriptions     Pending Prescriptions Disp Refills    albuterol sulfate  (90 Base) MCG/ACT inhaler 6.7 g 2     Sig: Inhale 2 puffs Every 4 (Four) Hours As Needed for Shortness of Air.    cetirizine (zyrTEC) 10 MG tablet 90 tablet 1     Sig: Take 1 tablet by mouth Daily. FOR 30 DAYS    DULoxetine (CYMBALTA) 60 MG capsule 90 capsule 0     Sig: Take 1 capsule by mouth Daily.    lidocaine (LIDODERM) 5 % 30 each 0     Sig: Place 1 patch on the skin as directed by provider Daily. Remove & Discard patch within 12 hours or as directed by MD    monteluilyast (SINGULAIR) 10 MG tablet 90 tablet 1     Sig: Take 1 tablet by mouth Every Evening.    omeprazole (priLOSEC) 40 MG capsule 90 capsule 0     Sig: Take 1 capsule by mouth Daily.        Pharmacy where request should be sent: [address] [phone] [facility] [phone]      Last office visit with prescribing clinician: 6/7/2023   Last telemedicine visit with prescribing clinician: Visit date not found   Next office visit with prescribing clinician: Visit date not found     Additional details provided by patient: REQUESTING 3 REFILLS    Does the patient have less than a 3 day supply:  [x] Yes  [] No    Would you like a call back once the refill request has been completed: [x] Yes [] No    If the office needs to give you a call back, can they leave a voicemail: [] Yes [x] No    Adriana Alvarenga Rep   04/26/24 09:09 EDT

## 2024-05-16 ENCOUNTER — OFFICE VISIT (OUTPATIENT)
Dept: FAMILY MEDICINE CLINIC | Facility: CLINIC | Age: 64
End: 2024-05-16
Payer: COMMERCIAL

## 2024-05-16 VITALS
DIASTOLIC BLOOD PRESSURE: 74 MMHG | OXYGEN SATURATION: 96 % | RESPIRATION RATE: 16 BRPM | BODY MASS INDEX: 37.17 KG/M2 | TEMPERATURE: 98 F | WEIGHT: 202 LBS | SYSTOLIC BLOOD PRESSURE: 132 MMHG | HEART RATE: 104 BPM | HEIGHT: 62 IN

## 2024-05-16 DIAGNOSIS — M25.50 ARTHRALGIA, UNSPECIFIED JOINT: ICD-10-CM

## 2024-05-16 DIAGNOSIS — R21 SKIN RASH: ICD-10-CM

## 2024-05-16 DIAGNOSIS — E66.9 OBESITY (BMI 30-39.9): ICD-10-CM

## 2024-05-16 DIAGNOSIS — N94.89 ENDOMETRIAL MASS: ICD-10-CM

## 2024-05-16 DIAGNOSIS — Z12.31 ENCOUNTER FOR SCREENING MAMMOGRAM FOR MALIGNANT NEOPLASM OF BREAST: ICD-10-CM

## 2024-05-16 DIAGNOSIS — F17.200 SMOKER: ICD-10-CM

## 2024-05-16 DIAGNOSIS — E11.9 TYPE 2 DIABETES MELLITUS WITHOUT COMPLICATION, WITHOUT LONG-TERM CURRENT USE OF INSULIN: Primary | ICD-10-CM

## 2024-05-16 PROCEDURE — 1160F RVW MEDS BY RX/DR IN RCRD: CPT | Performed by: STUDENT IN AN ORGANIZED HEALTH CARE EDUCATION/TRAINING PROGRAM

## 2024-05-16 PROCEDURE — 1125F AMNT PAIN NOTED PAIN PRSNT: CPT | Performed by: STUDENT IN AN ORGANIZED HEALTH CARE EDUCATION/TRAINING PROGRAM

## 2024-05-16 PROCEDURE — 99214 OFFICE O/P EST MOD 30 MIN: CPT | Performed by: STUDENT IN AN ORGANIZED HEALTH CARE EDUCATION/TRAINING PROGRAM

## 2024-05-16 PROCEDURE — 1159F MED LIST DOCD IN RCRD: CPT | Performed by: STUDENT IN AN ORGANIZED HEALTH CARE EDUCATION/TRAINING PROGRAM

## 2024-05-16 RX ORDER — CLOTRIMAZOLE AND BETAMETHASONE DIPROPIONATE 10; .64 MG/G; MG/G
1 CREAM TOPICAL 2 TIMES DAILY
Qty: 45 G | Refills: 1 | Status: SHIPPED | OUTPATIENT
Start: 2024-05-16

## 2024-05-16 NOTE — PROGRESS NOTES
"Chief Complaint  Skin Discoloration diabetes follow-up/endometrial mass    Subjective         Autumn Muñoz is a 64 y.o. female who presents to [facility]    64 years old comes to the clinic today to follow-up.    Endometrial mass; following up with OB/GYN, possible biopsy soon.    Current smoker/history of bilateral hilar lymphadenopathy; low-dose chest CT pending for patient.    Type 2 diabetes; noncompliant with blood work, agrees to do the blood work this week.    Complaining of skin rash around the chin and reporting possibility of ringworm.    12+ review of systems are unremarkable otherwise.    Objective   Vital Signs:   Vitals:    05/16/24 0747   BP: 132/74   Pulse: 104   Resp: 16   Temp: 98 °F (36.7 °C)   SpO2: 96%   Weight: 91.6 kg (202 lb)   Height: 157.5 cm (62\")      Body mass index is 36.95 kg/m².   Wt Readings from Last 3 Encounters:   05/16/24 91.6 kg (202 lb)   04/22/24 92.8 kg (204 lb 9.6 oz)   12/15/23 92 kg (202 lb 13.2 oz)      BP Readings from Last 3 Encounters:   05/16/24 132/74   04/22/24 117/76   12/15/23 140/70        Patient Care Team:  Pavan Lee MD as PCP - General (Family Medicine)     Physical Exam  Vitals reviewed.   Constitutional:       Appearance: Normal appearance. She is well-developed.   HENT:      Head: Normocephalic and atraumatic.      Right Ear: External ear normal.      Left Ear: External ear normal.      Mouth/Throat:      Pharynx: No oropharyngeal exudate.   Eyes:      Conjunctiva/sclera: Conjunctivae normal.      Pupils: Pupils are equal, round, and reactive to light.   Cardiovascular:      Rate and Rhythm: Normal rate and regular rhythm.      Heart sounds: No murmur heard.     No friction rub. No gallop.   Pulmonary:      Effort: Pulmonary effort is normal.      Breath sounds: Normal breath sounds. No wheezing or rhonchi.   Abdominal:      General: Bowel sounds are normal. There is no distension.      Palpations: Abdomen is " soft.      Tenderness: There is no abdominal tenderness.   Skin:     General: Skin is warm and dry.   Neurological:      Mental Status: She is alert and oriented to person, place, and time.      Cranial Nerves: No cranial nerve deficit.   Psychiatric:         Mood and Affect: Mood and affect normal.         Behavior: Behavior normal.         Thought Content: Thought content normal.         Judgment: Judgment normal.            Class 2 Severe Obesity (BMI >=35 and <=39.9). Obesity-related health conditions include the following: obstructive sleep apnea, hypertension, and coronary heart disease. Obesity is unchanged. BMI is is above average; BMI management plan is completed. We discussed portion control and increasing exercise.              Assessment and Plan   Diagnoses and all orders for this visit:    1. Type 2 diabetes mellitus without complication, without long-term current use of insulin (Primary)  Comments:  Diet controlled, new blood work needed.  Orders:  -     TSH Rfx On Abnormal To Free T4; Future  -     CBC & Differential; Future  -     Comprehensive Metabolic Panel; Future  -     Hemoglobin A1c; Future  -     Lipid Panel; Future  -     Microalbumin / Creatinine Urine Ratio - Urine, Clean Catch  -     IVON With / DsDNA, RNP, Sjogrens A / B, Juares; Future    2. Arthralgia, unspecified joint  Comments:  Workup pending  Orders:  -     TSH Rfx On Abnormal To Free T4; Future  -     CBC & Differential; Future  -     Comprehensive Metabolic Panel; Future  -     Hemoglobin A1c; Future  -     Lipid Panel; Future  -     Microalbumin / Creatinine Urine Ratio - Urine, Clean Catch  -     IVON With / DsDNA, RNP, Sjogrens A / B, Juares; Future    3. Smoker  -      CT Chest Low Dose Cancer Screening WO; Future    4. Encounter for screening mammogram for malignant neoplasm of breast  -     Mammo Screening Digital Tomosynthesis Bilateral With CAD; Future    5. Skin rash  Comments:  Triamcinolone/clotrimazole cream prescribed,  may consider dermatologist if not improved  Orders:  -     clotrimazole-betamethasone (Lotrisone) 1-0.05 % cream; Apply 1 Application topically to the appropriate area as directed 2 (Two) Times a Day.  Dispense: 45 g; Refill: 1    6. Endometrial mass  Comments:  Continue with OB/GYN, MRI pelvic reviewed with patient    7. Obesity (BMI 30-39.9)  Comments:  Lifestyle modifications discussed          Tobacco Use: Medium Risk (5/16/2024)    Patient History     Smoking Tobacco Use: Former     Smokeless Tobacco Use: Never     Passive Exposure: Never            Follow Up   Return in about 3 months (around 8/16/2024) for Next scheduled follow up.  Patient was given instructions and counseling regarding her condition or for health maintenance advice. Please see specific information pulled into the AVS if appropriate.

## 2024-06-03 ENCOUNTER — TELEPHONE (OUTPATIENT)
Dept: OBSTETRICS AND GYNECOLOGY | Facility: CLINIC | Age: 64
End: 2024-06-03
Payer: COMMERCIAL

## 2024-06-11 ENCOUNTER — LAB (OUTPATIENT)
Dept: LAB | Facility: HOSPITAL | Age: 64
End: 2024-06-11
Payer: COMMERCIAL

## 2024-06-11 DIAGNOSIS — M25.50 ARTHRALGIA, UNSPECIFIED JOINT: ICD-10-CM

## 2024-06-11 DIAGNOSIS — E11.9 TYPE 2 DIABETES MELLITUS WITHOUT COMPLICATION, WITHOUT LONG-TERM CURRENT USE OF INSULIN: ICD-10-CM

## 2024-06-11 LAB
ALBUMIN SERPL-MCNC: 4.1 G/DL (ref 3.5–5.2)
ALBUMIN UR-MCNC: 1.2 MG/DL
ALBUMIN/GLOB SERPL: 1.4 G/DL
ALP SERPL-CCNC: 125 U/L (ref 39–117)
ALT SERPL W P-5'-P-CCNC: 17 U/L (ref 1–33)
ANION GAP SERPL CALCULATED.3IONS-SCNC: 9.7 MMOL/L (ref 5–15)
AST SERPL-CCNC: 15 U/L (ref 1–32)
BASOPHILS # BLD AUTO: 0.02 10*3/MM3 (ref 0–0.2)
BASOPHILS NFR BLD AUTO: 0.3 % (ref 0–1.5)
BILIRUB SERPL-MCNC: <0.2 MG/DL (ref 0–1.2)
BUN SERPL-MCNC: 16 MG/DL (ref 8–23)
BUN/CREAT SERPL: 17.8 (ref 7–25)
CALCIUM SPEC-SCNC: 9.3 MG/DL (ref 8.6–10.5)
CHLORIDE SERPL-SCNC: 105 MMOL/L (ref 98–107)
CHOLEST SERPL-MCNC: 187 MG/DL (ref 0–200)
CO2 SERPL-SCNC: 28.3 MMOL/L (ref 22–29)
CREAT SERPL-MCNC: 0.9 MG/DL (ref 0.57–1)
CREAT UR-MCNC: 203.8 MG/DL
DEPRECATED RDW RBC AUTO: 41.3 FL (ref 37–54)
EGFRCR SERPLBLD CKD-EPI 2021: 72.4 ML/MIN/1.73
EOSINOPHIL # BLD AUTO: 0.13 10*3/MM3 (ref 0–0.4)
EOSINOPHIL NFR BLD AUTO: 1.9 % (ref 0.3–6.2)
ERYTHROCYTE [DISTWIDTH] IN BLOOD BY AUTOMATED COUNT: 12.8 % (ref 12.3–15.4)
GLOBULIN UR ELPH-MCNC: 3 GM/DL
GLUCOSE SERPL-MCNC: 98 MG/DL (ref 65–99)
HBA1C MFR BLD: 6.6 % (ref 4.8–5.6)
HCT VFR BLD AUTO: 42.9 % (ref 34–46.6)
HDLC SERPL-MCNC: 46 MG/DL (ref 40–60)
HGB BLD-MCNC: 14.5 G/DL (ref 12–15.9)
IMM GRANULOCYTES # BLD AUTO: 0.01 10*3/MM3 (ref 0–0.05)
IMM GRANULOCYTES NFR BLD AUTO: 0.1 % (ref 0–0.5)
LDLC SERPL CALC-MCNC: 114 MG/DL (ref 0–100)
LDLC/HDLC SERPL: 2.41 {RATIO}
LYMPHOCYTES # BLD AUTO: 2.25 10*3/MM3 (ref 0.7–3.1)
LYMPHOCYTES NFR BLD AUTO: 32.8 % (ref 19.6–45.3)
MCH RBC QN AUTO: 30.3 PG (ref 26.6–33)
MCHC RBC AUTO-ENTMCNC: 33.8 G/DL (ref 31.5–35.7)
MCV RBC AUTO: 89.6 FL (ref 79–97)
MICROALBUMIN/CREAT UR: 5.9 MG/G (ref 0–29)
MONOCYTES # BLD AUTO: 0.62 10*3/MM3 (ref 0.1–0.9)
MONOCYTES NFR BLD AUTO: 9.1 % (ref 5–12)
NEUTROPHILS NFR BLD AUTO: 3.82 10*3/MM3 (ref 1.7–7)
NEUTROPHILS NFR BLD AUTO: 55.8 % (ref 42.7–76)
NRBC BLD AUTO-RTO: 0 /100 WBC (ref 0–0.2)
PLATELET # BLD AUTO: 302 10*3/MM3 (ref 140–450)
PMV BLD AUTO: 10.8 FL (ref 6–12)
POTASSIUM SERPL-SCNC: 4 MMOL/L (ref 3.5–5.2)
PROT SERPL-MCNC: 7.1 G/DL (ref 6–8.5)
RBC # BLD AUTO: 4.79 10*6/MM3 (ref 3.77–5.28)
SODIUM SERPL-SCNC: 143 MMOL/L (ref 136–145)
TRIGL SERPL-MCNC: 151 MG/DL (ref 0–150)
TSH SERPL DL<=0.05 MIU/L-ACNC: 0.69 UIU/ML (ref 0.27–4.2)
VLDLC SERPL-MCNC: 27 MG/DL (ref 5–40)
WBC NRBC COR # BLD AUTO: 6.85 10*3/MM3 (ref 3.4–10.8)

## 2024-06-11 PROCEDURE — 36415 COLL VENOUS BLD VENIPUNCTURE: CPT

## 2024-06-11 PROCEDURE — 85025 COMPLETE CBC W/AUTO DIFF WBC: CPT

## 2024-06-11 PROCEDURE — 80053 COMPREHEN METABOLIC PANEL: CPT

## 2024-06-11 PROCEDURE — 83036 HEMOGLOBIN GLYCOSYLATED A1C: CPT

## 2024-06-11 PROCEDURE — 82570 ASSAY OF URINE CREATININE: CPT | Performed by: STUDENT IN AN ORGANIZED HEALTH CARE EDUCATION/TRAINING PROGRAM

## 2024-06-11 PROCEDURE — 82043 UR ALBUMIN QUANTITATIVE: CPT | Performed by: STUDENT IN AN ORGANIZED HEALTH CARE EDUCATION/TRAINING PROGRAM

## 2024-06-11 PROCEDURE — 84443 ASSAY THYROID STIM HORMONE: CPT

## 2024-06-11 PROCEDURE — 86038 ANTINUCLEAR ANTIBODIES: CPT

## 2024-06-11 PROCEDURE — 80061 LIPID PANEL: CPT

## 2024-06-13 LAB — ANA SER QL: NEGATIVE

## 2024-06-13 NOTE — PROGRESS NOTES
Surgical clearance     Patient just had a blood work done on 6/11/2024, advised to do the blood work for surgical clearance.  I have been trying to get hold of patient since blood work to talk about surgical clearance and possibly a visit to better understand her chronic conditions.  Patient is noncompliant and I have not seen her that many time in last 1 to 2 years.    Patient to call back or schedule a visit to talk about surgical clearance and possibly cardiac clearance if needed.

## 2024-07-17 ENCOUNTER — HOSPITAL ENCOUNTER (OUTPATIENT)
Dept: CT IMAGING | Facility: HOSPITAL | Age: 64
Discharge: HOME | End: 2024-07-17
Admitting: STUDENT IN AN ORGANIZED HEALTH CARE EDUCATION/TRAINING PROGRAM
Payer: COMMERCIAL

## 2024-07-17 DIAGNOSIS — F17.200 SMOKER: ICD-10-CM

## 2024-07-17 PROCEDURE — 71271 CT THORAX LUNG CANCER SCR C-: CPT

## 2024-08-16 ENCOUNTER — OFFICE VISIT (OUTPATIENT)
Dept: FAMILY MEDICINE CLINIC | Facility: CLINIC | Age: 64
End: 2024-08-16
Payer: COMMERCIAL

## 2024-08-16 VITALS
TEMPERATURE: 96.7 F | DIASTOLIC BLOOD PRESSURE: 80 MMHG | HEART RATE: 96 BPM | HEIGHT: 62 IN | SYSTOLIC BLOOD PRESSURE: 144 MMHG | OXYGEN SATURATION: 99 % | RESPIRATION RATE: 16 BRPM | WEIGHT: 201 LBS | BODY MASS INDEX: 36.99 KG/M2

## 2024-08-16 DIAGNOSIS — R03.0 ELEVATED BLOOD PRESSURE READING: ICD-10-CM

## 2024-08-16 DIAGNOSIS — R21 SKIN RASH: ICD-10-CM

## 2024-08-16 DIAGNOSIS — Z12.11 SCREENING FOR COLON CANCER: ICD-10-CM

## 2024-08-16 DIAGNOSIS — E11.9 TYPE 2 DIABETES MELLITUS WITHOUT COMPLICATION, WITHOUT LONG-TERM CURRENT USE OF INSULIN: Primary | ICD-10-CM

## 2024-08-16 DIAGNOSIS — Z72.0 TOBACCO ABUSE: ICD-10-CM

## 2024-08-16 DIAGNOSIS — D25.9 UTERINE LEIOMYOMA, UNSPECIFIED LOCATION: ICD-10-CM

## 2024-08-16 DIAGNOSIS — R93.89 ABNORMAL TRANSVAGINAL ULTRASOUND: ICD-10-CM

## 2024-08-16 DIAGNOSIS — Z12.83 SKIN EXAM FOR MALIGNANT NEOPLASM: ICD-10-CM

## 2024-08-16 DIAGNOSIS — F17.200 SMOKING: ICD-10-CM

## 2024-08-16 RX ORDER — CLOTRIMAZOLE AND BETAMETHASONE DIPROPIONATE 10; .64 MG/G; MG/G
1 CREAM TOPICAL 2 TIMES DAILY
Qty: 45 G | Refills: 1 | Status: SHIPPED | OUTPATIENT
Start: 2024-08-16

## 2024-08-16 NOTE — PROGRESS NOTES
"Chief Complaint  Diabetes and Fibromyalgia (Stiffness and pain has increased on left side)    Subjective         Autumn Muñoz is a 64 y.o. female who presents to [facility]    64 years old comes to the clinic today to follow-up on diabetes/abnormal pelvic MRI.    Pelvic mass/uterine fibroid/abnormal pelvic MRI; seen by OB/GYN and offered surgical interventions but patient does not want to do any interventions at this time.  Understands the risk of not doing surgical interventions    Current smoker    Diabetes type 2; well-controlled, not on any medication.    Patient had small skin rash underneath lower lip and seen by dermatology once but never complete the follow-up as per patient and would like to see different dermatologist.    12+ review of systems are unremarkable otherwise    Objective   Vital Signs:   Vitals:    08/16/24 0746   BP: 144/80   Pulse: 96   Resp: 16   Temp: 96.7 °F (35.9 °C)   SpO2: 99%   Weight: 91.2 kg (201 lb)   Height: 157.5 cm (62\")   PainSc:   6      Body mass index is 36.76 kg/m².   Wt Readings from Last 3 Encounters:   08/16/24 91.2 kg (201 lb)   05/16/24 91.6 kg (202 lb)   04/22/24 92.8 kg (204 lb 9.6 oz)      BP Readings from Last 3 Encounters:   08/16/24 144/80   05/16/24 132/74   04/22/24 117/76        Patient Care Team:  Pavan Lee MD as PCP - General (Family Medicine)     Physical Exam  Vitals reviewed.   Constitutional:       Appearance: Normal appearance. She is well-developed.   HENT:      Head: Normocephalic and atraumatic.      Right Ear: External ear normal.      Left Ear: External ear normal.      Mouth/Throat:      Pharynx: No oropharyngeal exudate.   Eyes:      Conjunctiva/sclera: Conjunctivae normal.      Pupils: Pupils are equal, round, and reactive to light.   Cardiovascular:      Rate and Rhythm: Normal rate and regular rhythm.      Heart sounds: No murmur heard.     No friction rub. No gallop.   Pulmonary:      Effort: " Pulmonary effort is normal.      Breath sounds: Normal breath sounds. No wheezing or rhonchi.   Abdominal:      General: Bowel sounds are normal. There is no distension.      Palpations: Abdomen is soft.      Tenderness: There is no abdominal tenderness.   Skin:     General: Skin is warm and dry.   Neurological:      Mental Status: She is alert and oriented to person, place, and time.      Cranial Nerves: No cranial nerve deficit.   Psychiatric:         Mood and Affect: Mood and affect normal.         Behavior: Behavior normal.         Thought Content: Thought content normal.         Judgment: Judgment normal.                          Assessment and Plan   Diagnoses and all orders for this visit:    1. Type 2 diabetes mellitus without complication, without long-term current use of insulin (Primary)  Comments:  Chronic, controlled, diet controlled.  Lifestyle modifications discussed.  Patient to continue with healthy lifestyle  Orders:  -     CBC & Differential; Future  -     Comprehensive Metabolic Panel; Future  -     Hemoglobin A1c; Future  -     Microalbumin / Creatinine Urine Ratio - Urine, Clean Catch; Future    2. Tobacco abuse  -     nicotine polacrilex (NICORETTE) 4 MG gum; Chew 1 each As Needed for Smoking Cessation. chew 1 piece of gum (4 mg) by oral route every 2 hours as needed and as directed  Dispense: 1 each; Refill: 2  -     CBC & Differential; Future  -     Comprehensive Metabolic Panel; Future  -     Hemoglobin A1c; Future  -     Microalbumin / Creatinine Urine Ratio - Urine, Clean Catch; Future    3. Skin rash  Comments:  Triamcinolone/clotrimazole cream prescribed, dermatology referral placed  Orders:  -     clotrimazole-betamethasone (Lotrisone) 1-0.05 % cream; Apply 1 Application topically to the appropriate area as directed 2 (Two) Times a Day.  Dispense: 45 g; Refill: 1  -     CBC & Differential; Future  -     Comprehensive Metabolic Panel; Future  -     Hemoglobin A1c; Future  -      Microalbumin / Creatinine Urine Ratio - Urine, Clean Catch; Future    4. Abnormal transvaginal ultrasound  Comments:  Does not want any surgical interventions but agrees to repeat transvaginal ultrasound  Orders:  -     US Non-ob Transvaginal; Future  -     CBC & Differential; Future  -     Comprehensive Metabolic Panel; Future  -     Hemoglobin A1c; Future  -     Microalbumin / Creatinine Urine Ratio - Urine, Clean Catch; Future    5. Uterine leiomyoma, unspecified location  -     US Non-ob Transvaginal; Future  -     CBC & Differential; Future  -     Comprehensive Metabolic Panel; Future  -     Hemoglobin A1c; Future  -     Microalbumin / Creatinine Urine Ratio - Urine, Clean Catch; Future    6. Smoking  -     CBC & Differential; Future  -     Comprehensive Metabolic Panel; Future  -     Hemoglobin A1c; Future  -     Microalbumin / Creatinine Urine Ratio - Urine, Clean Catch; Future    7. Screening for colon cancer  -     Ambulatory Referral For Screening Colonoscopy    8. Skin exam for malignant neoplasm  -     Ambulatory Referral to Dermatology    9. Elevated blood pressure reading  Comments:  Repeat blood pressure around same number.  2 weeks logs requested, DASH diet/weight loss goals discussed          Tobacco Use: Medium Risk (8/16/2024)    Patient History    • Smoking Tobacco Use: Former    • Smokeless Tobacco Use: Never    • Passive Exposure: Never            Follow Up   Return in about 6 months (around 2/16/2025) for Next scheduled follow up.  Patient was given instructions and counseling regarding her condition or for health maintenance advice. Please see specific information pulled into the AVS if appropriate.

## 2024-08-23 ENCOUNTER — OFFICE VISIT (OUTPATIENT)
Dept: FAMILY MEDICINE CLINIC | Facility: CLINIC | Age: 64
End: 2024-08-23
Payer: COMMERCIAL

## 2024-08-23 VITALS
DIASTOLIC BLOOD PRESSURE: 80 MMHG | OXYGEN SATURATION: 99 % | BODY MASS INDEX: 37.36 KG/M2 | TEMPERATURE: 96.2 F | HEIGHT: 62 IN | WEIGHT: 203 LBS | RESPIRATION RATE: 18 BRPM | SYSTOLIC BLOOD PRESSURE: 144 MMHG | HEART RATE: 83 BPM

## 2024-08-23 DIAGNOSIS — R09.81 HEAD CONGESTION: Primary | ICD-10-CM

## 2024-08-23 DIAGNOSIS — R09.89 CHEST CONGESTION: ICD-10-CM

## 2024-08-23 LAB
EXPIRATION DATE: NORMAL
INTERNAL CONTROL: NORMAL
Lab: NORMAL
SARS-COV-2 AG UPPER RESP QL IA.RAPID: NOT DETECTED

## 2024-08-23 PROCEDURE — 1160F RVW MEDS BY RX/DR IN RCRD: CPT | Performed by: STUDENT IN AN ORGANIZED HEALTH CARE EDUCATION/TRAINING PROGRAM

## 2024-08-23 PROCEDURE — 1125F AMNT PAIN NOTED PAIN PRSNT: CPT | Performed by: STUDENT IN AN ORGANIZED HEALTH CARE EDUCATION/TRAINING PROGRAM

## 2024-08-23 PROCEDURE — 3044F HG A1C LEVEL LT 7.0%: CPT | Performed by: STUDENT IN AN ORGANIZED HEALTH CARE EDUCATION/TRAINING PROGRAM

## 2024-08-23 PROCEDURE — 99213 OFFICE O/P EST LOW 20 MIN: CPT | Performed by: STUDENT IN AN ORGANIZED HEALTH CARE EDUCATION/TRAINING PROGRAM

## 2024-08-23 PROCEDURE — 87426 SARSCOV CORONAVIRUS AG IA: CPT | Performed by: STUDENT IN AN ORGANIZED HEALTH CARE EDUCATION/TRAINING PROGRAM

## 2024-08-23 PROCEDURE — 1159F MED LIST DOCD IN RCRD: CPT | Performed by: STUDENT IN AN ORGANIZED HEALTH CARE EDUCATION/TRAINING PROGRAM

## 2024-08-23 RX ORDER — BROMPHENIRAMINE MALEATE, PSEUDOEPHEDRINE HYDROCHLORIDE, AND DEXTROMETHORPHAN HYDROBROMIDE 2; 30; 10 MG/5ML; MG/5ML; MG/5ML
5 SYRUP ORAL 4 TIMES DAILY PRN
Qty: 118 ML | Refills: 0 | Status: SHIPPED | OUTPATIENT
Start: 2024-08-23

## 2024-08-23 RX ORDER — PREDNISONE 20 MG/1
20 TABLET ORAL 2 TIMES DAILY
Qty: 10 TABLET | Refills: 0 | Status: SHIPPED | OUTPATIENT
Start: 2024-08-23

## 2024-08-23 RX ORDER — AZITHROMYCIN 250 MG/1
TABLET, FILM COATED ORAL
Qty: 6 TABLET | Refills: 0 | Status: SHIPPED | OUTPATIENT
Start: 2024-08-23

## 2024-08-23 NOTE — PROGRESS NOTES
"Chief Complaint  Headache and URI    Subjective         Autumn Muñoz is a 64 y.o. female who presents to [facility]    64 years old comes to the clinic today for an acute visit.    Complaining of 4 days active symptoms of worsening cough/congestion/sinus drainage and ears fullness with facial congestion.  Does not report any fever/chest pain or shortness of breath.  Does report lots of drainage at nighttime, does not report any sick contact or recent travel.    Reports no anosmia/GI symptoms/nausea vomiting or diarrhea.    12+ review of systems are unremarkable otherwise    Objective   Vital Signs:   Vitals:    08/23/24 0659   BP: 144/80   Pulse: 83   Resp: 18   Temp: 96.2 °F (35.7 °C)   SpO2: 99%   Weight: 92.1 kg (203 lb)   Height: 157.5 cm (62\")      Body mass index is 37.13 kg/m².   Wt Readings from Last 3 Encounters:   08/23/24 92.1 kg (203 lb)   08/16/24 91.2 kg (201 lb)   05/16/24 91.6 kg (202 lb)      BP Readings from Last 3 Encounters:   08/23/24 144/80   08/16/24 144/80   05/16/24 132/74        Patient Care Team:  Pavan Lee MD as PCP - General (Family Medicine)     Physical Exam  HENT:      Right Ear: Tympanic membrane has decreased mobility.      Left Ear: Tympanic membrane has decreased mobility.      Mouth/Throat:      Pharynx: Pharyngeal swelling present. No posterior oropharyngeal erythema.      Comments: Inflamed pharyngeal mucosal lining with some sinus drainage  Pulmonary:      Effort: Pulmonary effort is normal.      Breath sounds: Normal breath sounds.                            Assessment and Plan   Diagnoses and all orders for this visit:    1. Head congestion (Primary)  -     POCT SARS-CoV-2 Antigen KRYSTYNA  -     predniSONE (DELTASONE) 20 MG tablet; Take 1 tablet by mouth 2 (Two) Times a Day.  Dispense: 10 tablet; Refill: 0  -     azithromycin (Zithromax Z-Kenny) 250 MG tablet; Take 2 tablets by mouth on day 1, then 1 tablet daily on days 2-5  Dispense: 6 " tablet; Refill: 0  -     brompheniramine-pseudoephedrine-DM 30-2-10 MG/5ML syrup; Take 5 mL by mouth 4 (Four) Times a Day As Needed for Congestion.  Dispense: 118 mL; Refill: 0    2. Chest congestion  -     predniSONE (DELTASONE) 20 MG tablet; Take 1 tablet by mouth 2 (Two) Times a Day.  Dispense: 10 tablet; Refill: 0  -     azithromycin (Zithromax Z-Kenny) 250 MG tablet; Take 2 tablets by mouth on day 1, then 1 tablet daily on days 2-5  Dispense: 6 tablet; Refill: 0  -     brompheniramine-pseudoephedrine-DM 30-2-10 MG/5ML syrup; Take 5 mL by mouth 4 (Four) Times a Day As Needed for Congestion.  Dispense: 118 mL; Refill: 0      Negative COVID, we will start empiric treatment.  Chest x-ray offered but declined.    Patient to follow-up in 2 to 3 days if not better or any worsening    Tobacco Use: Medium Risk (8/23/2024)    Patient History     Smoking Tobacco Use: Former     Smokeless Tobacco Use: Never     Passive Exposure: Never            Follow Up   Return if symptoms worsen or fail to improve.  Patient was given instructions and counseling regarding her condition or for health maintenance advice. Please see specific information pulled into the AVS if appropriate.

## 2024-09-02 PROCEDURE — 87635 SARS-COV-2 COVID-19 AMP PRB: CPT

## 2024-09-05 ENCOUNTER — TELEPHONE (OUTPATIENT)
Dept: FAMILY MEDICINE CLINIC | Facility: CLINIC | Age: 64
End: 2024-09-05
Payer: COMMERCIAL

## 2024-09-05 DIAGNOSIS — M19.90 ARTHRITIS: ICD-10-CM

## 2024-09-05 DIAGNOSIS — K21.9 CHRONIC GERD: ICD-10-CM

## 2024-09-05 RX ORDER — METHOCARBAMOL 750 MG/1
750 TABLET, FILM COATED ORAL 3 TIMES DAILY PRN
Qty: 90 TABLET | Refills: 0 | Status: SHIPPED | OUTPATIENT
Start: 2024-09-05

## 2024-09-05 RX ORDER — DULOXETIN HYDROCHLORIDE 60 MG/1
60 CAPSULE, DELAYED RELEASE ORAL DAILY
Qty: 90 CAPSULE | Refills: 0 | Status: SHIPPED | OUTPATIENT
Start: 2024-09-05

## 2024-09-05 RX ORDER — OMEPRAZOLE 40 MG/1
40 CAPSULE, DELAYED RELEASE ORAL DAILY
Qty: 90 CAPSULE | Refills: 0 | Status: SHIPPED | OUTPATIENT
Start: 2024-09-05

## 2024-09-05 NOTE — TELEPHONE ENCOUNTER
Caller: Autumn Muñoz    Relationship: Self    Best call back number: [phone]     What medication are you requesting: METHOCARBAMOL 500 MG TABLET (ROBAXIN) 3 TIMES PER DAY AS NEEDED.    What are your current symptoms: MUSCLE SPASMS/FIBROMYALGIA        Have you had these symptoms before:    [x] Yes  [] No    Have you been treated for these symptoms before:   [x] Yes  [] No    If a prescription is needed, what is your preferred pharmacy and phone number: [facility] [address] [place] [address] [phone] [facility] [phone]      Additional notes: PATIENT'S OLD PRESCRIPTION  AND SHE IS NOW OUT OF MEDICATION. PATIENT ONLY TAKES MEDICATION AS NEEDED.

## 2024-09-05 NOTE — TELEPHONE ENCOUNTER
Caller: Autumn Muñoz Pastor    Relationship: Self    Best call back number: [phone]     Requested Prescriptions:   Requested Prescriptions     Pending Prescriptions Disp Refills    omeprazole (priLOSEC) 40 MG capsule 90 capsule 0     Sig: Take 1 capsule by mouth Daily.    DULoxetine (CYMBALTA) 60 MG capsule 90 capsule 0     Sig: Take 1 capsule by mouth Daily.        Pharmacy where request should be sent: [address] [facility] [phone] [facility] [phone]      Last office visit with prescribing clinician: 8/23/2024   Last telemedicine visit with prescribing clinician: Visit date not found   Next office visit with prescribing clinician: 2/20/2025     Additional details provided by patient: PATIENT IS OUT OF MEDICATIONS    Does the patient have less than a 3 day supply:  [x] Yes  [] No    Would you like a call back once the refill request has been completed: [] Yes [] No    If the office needs to give you a call back, can they leave a voicemail: [] Yes [] No    Adriana Delacruz Rep   09/05/24 11:40 EDT

## 2024-09-05 NOTE — TELEPHONE ENCOUNTER
UPCOMING APPTS  With Family Medicine (Paavn Lee MD)  02/20/2025 at 7:45 AM  LAST OFFICE VISIT - THIS DEPT  8/23/2024 Pavan Lee MD

## 2024-09-17 ENCOUNTER — TELEPHONE (OUTPATIENT)
Dept: FAMILY MEDICINE CLINIC | Facility: CLINIC | Age: 64
End: 2024-09-17
Payer: COMMERCIAL

## 2024-09-17 DIAGNOSIS — E11.9 DIABETIC EYE EXAM: Primary | ICD-10-CM

## 2024-09-17 DIAGNOSIS — Z01.00 DIABETIC EYE EXAM: Primary | ICD-10-CM

## 2024-12-04 NOTE — ED NOTES
Pt declined Patria Cooper, RN  03/03/22 3302     [Time Spent: ___ minutes] : I have spent [unfilled] minutes of time on the encounter which excludes teaching and separately reported services.

## 2024-12-26 ENCOUNTER — HOSPITAL ENCOUNTER (EMERGENCY)
Facility: HOSPITAL | Age: 64
Discharge: LEFT BEFORE DISCHARGE | End: 2024-12-26
Attending: EMERGENCY MEDICINE
Payer: COMMERCIAL

## 2024-12-26 PROCEDURE — 99211 OFF/OP EST MAY X REQ PHY/QHP: CPT | Performed by: EMERGENCY MEDICINE

## 2025-01-02 ENCOUNTER — TELEPHONE (OUTPATIENT)
Dept: FAMILY MEDICINE CLINIC | Facility: CLINIC | Age: 65
End: 2025-01-02
Payer: COMMERCIAL

## 2025-01-02 DIAGNOSIS — R06.02 SHORTNESS OF BREATH: ICD-10-CM

## 2025-01-02 RX ORDER — ALBUTEROL SULFATE 0.83 MG/ML
2.5 SOLUTION RESPIRATORY (INHALATION) EVERY 4 HOURS PRN
Qty: 90 ML | Refills: 12 | Status: SHIPPED | OUTPATIENT
Start: 2025-01-02

## 2025-01-02 NOTE — TELEPHONE ENCOUNTER
Patient was seen in  on 12/26. Insurance would not pay for her breathing treatments. She wanting dr schmidt to provide a new script so we are able to get insurance to authorize them. She is down to her last two treatments from an older script. Patient is also requesting lab work to be put in along with a PCR so she able to see what is possibly going on.   Patient would like a call back with an update

## 2025-01-16 DIAGNOSIS — M19.90 ARTHRITIS: ICD-10-CM

## 2025-01-16 RX ORDER — DULOXETIN HYDROCHLORIDE 60 MG/1
60 CAPSULE, DELAYED RELEASE ORAL DAILY
Qty: 90 CAPSULE | Refills: 0 | Status: SHIPPED | OUTPATIENT
Start: 2025-01-16

## 2025-02-25 ENCOUNTER — TELEPHONE (OUTPATIENT)
Dept: FAMILY MEDICINE CLINIC | Facility: CLINIC | Age: 65
End: 2025-02-25

## 2025-02-25 NOTE — TELEPHONE ENCOUNTER
Name: Autumn Muñoz Pastor    Relationship: Self    Best Callback Number: [phone]    HUB PROVIDED THE RELAY MESSAGE FROM THE OFFICE   PATIENT VOICED UNDERSTANDING AND HAS NO FURTHER QUESTIONS AT THIS TIME    ADDITIONAL INFORMATION:  RESCHEDULED TO 03.05.2025

## 2025-02-25 NOTE — TELEPHONE ENCOUNTER
Relay  Vmb full  Patient missed their appointment scheduled on 2/25/25 with   Would patient like to be rescheduled?  No show letter sent to patient either via mychart or mail.

## 2025-03-05 ENCOUNTER — OFFICE VISIT (OUTPATIENT)
Dept: FAMILY MEDICINE CLINIC | Facility: CLINIC | Age: 65
End: 2025-03-05
Payer: COMMERCIAL

## 2025-03-05 ENCOUNTER — LAB (OUTPATIENT)
Dept: LAB | Facility: HOSPITAL | Age: 65
End: 2025-03-05
Payer: COMMERCIAL

## 2025-03-05 VITALS
BODY MASS INDEX: 38.28 KG/M2 | TEMPERATURE: 96.2 F | HEART RATE: 105 BPM | RESPIRATION RATE: 18 BRPM | OXYGEN SATURATION: 97 % | DIASTOLIC BLOOD PRESSURE: 64 MMHG | WEIGHT: 208 LBS | HEIGHT: 62 IN | SYSTOLIC BLOOD PRESSURE: 118 MMHG

## 2025-03-05 DIAGNOSIS — M48.02 CERVICAL STENOSIS OF SPINAL CANAL: ICD-10-CM

## 2025-03-05 DIAGNOSIS — E11.9 TYPE 2 DIABETES MELLITUS WITHOUT COMPLICATION, WITHOUT LONG-TERM CURRENT USE OF INSULIN: ICD-10-CM

## 2025-03-05 DIAGNOSIS — Z12.11 SCREENING FOR COLON CANCER: ICD-10-CM

## 2025-03-05 DIAGNOSIS — R93.89 ABNORMAL TRANSVAGINAL ULTRASOUND: ICD-10-CM

## 2025-03-05 DIAGNOSIS — Z00.00 ANNUAL PHYSICAL EXAM: Primary | ICD-10-CM

## 2025-03-05 DIAGNOSIS — N94.89 ENDOMETRIAL MASS: ICD-10-CM

## 2025-03-05 DIAGNOSIS — Z00.00 ANNUAL PHYSICAL EXAM: ICD-10-CM

## 2025-03-05 DIAGNOSIS — Z12.31 ENCOUNTER FOR SCREENING MAMMOGRAM FOR MALIGNANT NEOPLASM OF BREAST: ICD-10-CM

## 2025-03-05 DIAGNOSIS — Z23 NEED FOR VACCINATION: ICD-10-CM

## 2025-03-05 NOTE — PROGRESS NOTES
"Chief Complaint  Hyperlipidemia    Subjective         Autumn Muñoz is a 64 y.o. female who presents to [facility]    64 years old comes to the clinic today to follow-up and annual physical.    Diabetes type 2; new A1c needed, not on any medication.    Compliant with Lipitor.    Patient has endometrial mass, seen by OB/GYN in the past.  MRI was done but patient does not want to do any follow-up.    Physically active otherwise, still complaining of neck pain and radiating symptoms, seen by neurosurgery in the past and had neck MRI within last few years.    12+ review of systems are unremarkable otherwise    Objective   Vital Signs:   Vitals:    03/05/25 1034   BP: 118/64   BP Location: Left arm   Patient Position: Sitting   Cuff Size: Adult   Pulse: 105   Resp: 18   Temp: 96.2 °F (35.7 °C)   TempSrc: Temporal   SpO2: 97%   Weight: 94.3 kg (208 lb)   Height: 157.5 cm (62\")      Body mass index is 38.04 kg/m².   Wt Readings from Last 3 Encounters:   03/05/25 94.3 kg (208 lb)   01/09/25 91.3 kg (201 lb 4.8 oz)   08/23/24 92.1 kg (203 lb)      BP Readings from Last 3 Encounters:   03/05/25 118/64   01/09/25 138/90   12/26/24 144/88        Patient Care Team:  Pavan Lee MD as PCP - General (Family Medicine)     Physical Exam  Vitals reviewed.   Constitutional:       Appearance: Normal appearance. She is well-developed.   HENT:      Head: Normocephalic and atraumatic.      Right Ear: External ear normal.      Left Ear: External ear normal.      Mouth/Throat:      Pharynx: No oropharyngeal exudate.   Eyes:      Conjunctiva/sclera: Conjunctivae normal.      Pupils: Pupils are equal, round, and reactive to light.   Cardiovascular:      Rate and Rhythm: Normal rate and regular rhythm.      Heart sounds: No murmur heard.     No friction rub. No gallop.   Pulmonary:      Effort: Pulmonary effort is normal.      Breath sounds: Normal breath sounds. No wheezing or rhonchi.   Abdominal:     "  General: Bowel sounds are normal. There is no distension.      Palpations: Abdomen is soft.      Tenderness: There is no abdominal tenderness.   Skin:     General: Skin is warm and dry.   Neurological:      Mental Status: She is alert and oriented to person, place, and time.      Cranial Nerves: No cranial nerve deficit.   Psychiatric:         Mood and Affect: Mood and affect normal.         Behavior: Behavior normal.         Thought Content: Thought content normal.         Judgment: Judgment normal.                          Assessment and Plan   Diagnoses and all orders for this visit:    1. Annual physical exam (Primary)  -     TSH Rfx On Abnormal To Free T4; Future  -     CBC & Differential; Future  -     Comprehensive Metabolic Panel; Future  -     Hemoglobin A1c; Future  -     Lipid Panel; Future  -     Microalbumin / Creatinine Urine Ratio - Urine, Clean Catch    2. Type 2 diabetes mellitus without complication, without long-term current use of insulin  Comments:  Diet controlled, noncompliant, new blood work needed.  Further management after the blood work  Orders:  -     TSH Rfx On Abnormal To Free T4; Future  -     CBC & Differential; Future  -     Comprehensive Metabolic Panel; Future  -     Hemoglobin A1c; Future  -     Lipid Panel; Future  -     Microalbumin / Creatinine Urine Ratio - Urine, Clean Catch    3. Screening for colon cancer  -     TSH Rfx On Abnormal To Free T4; Future  -     CBC & Differential; Future  -     Comprehensive Metabolic Panel; Future  -     Hemoglobin A1c; Future  -     Lipid Panel; Future  -     Microalbumin / Creatinine Urine Ratio - Urine, Clean Catch  -     Cologuard - Stool, Per Rectum; Future    4. Encounter for screening mammogram for malignant neoplasm of breast  -     Mammo Screening Digital Tomosynthesis Bilateral With CAD; Future    5. Cervical stenosis of spinal canal  Comments:  Seen by neurosurgery in the past.  Still reports some symptoms but does not want to take  next step    6. Abnormal transvaginal ultrasound  Comments:  noncompliant, seen by OB/GYN.  Repeat study was ordered by me as.  Patient is aware of the situation, does not want further management.  Orders:  -     US Non-ob Transvaginal; Future    7. Endometrial mass  -     US Non-ob Transvaginal; Future    8. Need for vaccination  -     Pneumococcal Conjugate Vaccine 20-Valent All          Tobacco Use: High Risk (3/5/2025)    Patient History    • Smoking Tobacco Use: Every Day    • Smokeless Tobacco Use: Never    • Passive Exposure: Current            Follow Up   Return in about 6 months (around 9/5/2025) for Next scheduled follow up.  Patient was given instructions and counseling regarding her condition or for health maintenance advice. Please see specific information pulled into the AVS if appropriate.

## 2025-03-15 DIAGNOSIS — K21.9 CHRONIC GERD: ICD-10-CM

## 2025-03-17 RX ORDER — OMEPRAZOLE 40 MG/1
40 CAPSULE, DELAYED RELEASE ORAL DAILY
Qty: 90 CAPSULE | Refills: 0 | OUTPATIENT
Start: 2025-03-17

## 2025-04-02 ENCOUNTER — LAB (OUTPATIENT)
Dept: LAB | Facility: HOSPITAL | Age: 65
End: 2025-04-02
Payer: COMMERCIAL

## 2025-04-02 DIAGNOSIS — Z12.11 SCREENING FOR COLON CANCER: ICD-10-CM

## 2025-04-02 DIAGNOSIS — Z00.00 ANNUAL PHYSICAL EXAM: ICD-10-CM

## 2025-04-02 DIAGNOSIS — E11.9 TYPE 2 DIABETES MELLITUS WITHOUT COMPLICATION, WITHOUT LONG-TERM CURRENT USE OF INSULIN: ICD-10-CM

## 2025-04-02 LAB
ALBUMIN SERPL-MCNC: 3.7 G/DL (ref 3.5–5.2)
ALBUMIN/GLOB SERPL: 1.2 G/DL
ALP SERPL-CCNC: 146 U/L (ref 39–117)
ALT SERPL W P-5'-P-CCNC: 35 U/L (ref 1–33)
ANION GAP SERPL CALCULATED.3IONS-SCNC: 10.5 MMOL/L (ref 5–15)
AST SERPL-CCNC: 21 U/L (ref 1–32)
BASOPHILS # BLD AUTO: 0.05 10*3/MM3 (ref 0–0.2)
BASOPHILS NFR BLD AUTO: 0.7 % (ref 0–1.5)
BILIRUB SERPL-MCNC: 0.3 MG/DL (ref 0–1.2)
BUN SERPL-MCNC: 14 MG/DL (ref 8–23)
BUN/CREAT SERPL: 14.9 (ref 7–25)
CALCIUM SPEC-SCNC: 9.4 MG/DL (ref 8.6–10.5)
CHLORIDE SERPL-SCNC: 106 MMOL/L (ref 98–107)
CHOLEST SERPL-MCNC: 262 MG/DL (ref 0–200)
CO2 SERPL-SCNC: 24.5 MMOL/L (ref 22–29)
CREAT SERPL-MCNC: 0.94 MG/DL (ref 0.57–1)
DEPRECATED RDW RBC AUTO: 44.4 FL (ref 37–54)
EGFRCR SERPLBLD CKD-EPI 2021: 68.3 ML/MIN/1.73
EOSINOPHIL # BLD AUTO: 0.2 10*3/MM3 (ref 0–0.4)
EOSINOPHIL NFR BLD AUTO: 2.7 % (ref 0.3–6.2)
ERYTHROCYTE [DISTWIDTH] IN BLOOD BY AUTOMATED COUNT: 13.5 % (ref 12.3–15.4)
GLOBULIN UR ELPH-MCNC: 3.2 GM/DL
GLUCOSE SERPL-MCNC: 120 MG/DL (ref 65–99)
HBA1C MFR BLD: 6.9 % (ref 4.8–5.6)
HCT VFR BLD AUTO: 41.6 % (ref 34–46.6)
HDLC SERPL-MCNC: 51 MG/DL (ref 40–60)
HGB BLD-MCNC: 13.7 G/DL (ref 12–15.9)
IMM GRANULOCYTES # BLD AUTO: 0.02 10*3/MM3 (ref 0–0.05)
IMM GRANULOCYTES NFR BLD AUTO: 0.3 % (ref 0–0.5)
LDLC SERPL CALC-MCNC: 179 MG/DL (ref 0–100)
LDLC/HDLC SERPL: 3.45 {RATIO}
LYMPHOCYTES # BLD AUTO: 2.24 10*3/MM3 (ref 0.7–3.1)
LYMPHOCYTES NFR BLD AUTO: 30.2 % (ref 19.6–45.3)
MCH RBC QN AUTO: 29.7 PG (ref 26.6–33)
MCHC RBC AUTO-ENTMCNC: 32.9 G/DL (ref 31.5–35.7)
MCV RBC AUTO: 90.2 FL (ref 79–97)
MONOCYTES # BLD AUTO: 0.82 10*3/MM3 (ref 0.1–0.9)
MONOCYTES NFR BLD AUTO: 11.1 % (ref 5–12)
NEUTROPHILS NFR BLD AUTO: 4.09 10*3/MM3 (ref 1.7–7)
NEUTROPHILS NFR BLD AUTO: 55 % (ref 42.7–76)
NRBC BLD AUTO-RTO: 0 /100 WBC (ref 0–0.2)
PLATELET # BLD AUTO: 325 10*3/MM3 (ref 140–450)
PMV BLD AUTO: 11 FL (ref 6–12)
POTASSIUM SERPL-SCNC: 4.7 MMOL/L (ref 3.5–5.2)
PROT SERPL-MCNC: 6.9 G/DL (ref 6–8.5)
RBC # BLD AUTO: 4.61 10*6/MM3 (ref 3.77–5.28)
SODIUM SERPL-SCNC: 141 MMOL/L (ref 136–145)
TRIGL SERPL-MCNC: 174 MG/DL (ref 0–150)
TSH SERPL DL<=0.05 MIU/L-ACNC: 2.16 UIU/ML (ref 0.27–4.2)
VLDLC SERPL-MCNC: 32 MG/DL (ref 5–40)
WBC NRBC COR # BLD AUTO: 7.42 10*3/MM3 (ref 3.4–10.8)

## 2025-04-02 PROCEDURE — 85025 COMPLETE CBC W/AUTO DIFF WBC: CPT

## 2025-04-02 PROCEDURE — 80053 COMPREHEN METABOLIC PANEL: CPT

## 2025-04-02 PROCEDURE — 80061 LIPID PANEL: CPT

## 2025-04-02 PROCEDURE — 83036 HEMOGLOBIN GLYCOSYLATED A1C: CPT

## 2025-04-02 PROCEDURE — 36415 COLL VENOUS BLD VENIPUNCTURE: CPT

## 2025-04-03 ENCOUNTER — RESULTS FOLLOW-UP (OUTPATIENT)
Dept: LAB | Facility: HOSPITAL | Age: 65
End: 2025-04-03
Payer: COMMERCIAL

## 2025-04-03 DIAGNOSIS — E11.9 TYPE 2 DIABETES MELLITUS WITHOUT COMPLICATION, WITHOUT LONG-TERM CURRENT USE OF INSULIN: Primary | ICD-10-CM

## 2025-04-03 DIAGNOSIS — K21.9 GASTROESOPHAGEAL REFLUX DISEASE, UNSPECIFIED WHETHER ESOPHAGITIS PRESENT: ICD-10-CM

## 2025-04-03 RX ORDER — OMEPRAZOLE 20 MG/1
40 CAPSULE, DELAYED RELEASE ORAL DAILY
Qty: 30 CAPSULE | Refills: 0 | Status: CANCELLED | OUTPATIENT
Start: 2025-04-03

## 2025-04-03 RX ORDER — OMEPRAZOLE 20 MG/1
40 CAPSULE, DELAYED RELEASE ORAL DAILY
Qty: 30 CAPSULE | Refills: 0 | Status: SHIPPED | OUTPATIENT
Start: 2025-04-03

## 2025-04-11 DIAGNOSIS — R19.5 POSITIVE COLORECTAL CANCER SCREENING USING COLOGUARD TEST: Primary | ICD-10-CM

## 2025-04-14 ENCOUNTER — TELEPHONE (OUTPATIENT)
Dept: FAMILY MEDICINE CLINIC | Facility: CLINIC | Age: 65
End: 2025-04-14
Payer: COMMERCIAL

## 2025-05-05 DIAGNOSIS — K21.9 GASTROESOPHAGEAL REFLUX DISEASE, UNSPECIFIED WHETHER ESOPHAGITIS PRESENT: ICD-10-CM

## 2025-05-05 RX ORDER — OMEPRAZOLE 20 MG/1
40 CAPSULE, DELAYED RELEASE ORAL DAILY
Qty: 30 CAPSULE | Refills: 3 | Status: SHIPPED | OUTPATIENT
Start: 2025-05-05

## 2025-05-05 NOTE — TELEPHONE ENCOUNTER
Caller: Autumn Muñoz Pastor    Relationship: Self    Best call back number:     Requested Prescriptions:   Requested Prescriptions     Pending Prescriptions Disp Refills    omeprazole (priLOSEC) 20 MG capsule 30 capsule 0     Sig: Take 2 capsules by mouth Daily.        Pharmacy where request should be sent: [facility] [address] [place] [address] [phone] [facility] [phone] FX     Last office visit with prescribing clinician: 3/5/2025   Last telemedicine visit with prescribing clinician: Visit date not found   Next office visit with prescribing clinician: Visit date not found     Additional details provided by patient: PATIENT IS REQUESTING REFILLS TO BE INCLUDED IF POSSIBLE    Does the patient have less than a 3 day supply:  [x] Yes  [] No    Would you like a call back once the refill request has been completed: [] Yes [x] No    If the office needs to give you a call back, can they leave a voicemail: [] Yes [x] No    Adriana Castellanos Rep   05/05/25 09:02 EDT